# Patient Record
Sex: FEMALE | Race: ASIAN | NOT HISPANIC OR LATINO | Employment: UNEMPLOYED | URBAN - METROPOLITAN AREA
[De-identification: names, ages, dates, MRNs, and addresses within clinical notes are randomized per-mention and may not be internally consistent; named-entity substitution may affect disease eponyms.]

---

## 2017-03-23 ENCOUNTER — ANESTHESIA (OUTPATIENT)
Dept: OBGYN | Age: 22
DRG: 540 | End: 2017-03-23

## 2017-03-23 ENCOUNTER — HOSPITAL ENCOUNTER (INPATIENT)
Age: 22
LOS: 3 days | Discharge: HOME | DRG: 540 | End: 2017-03-26
Attending: OBSTETRICS & GYNECOLOGY | Admitting: OBSTETRICS & GYNECOLOGY

## 2017-03-23 ENCOUNTER — ANESTHESIA EVENT (OUTPATIENT)
Dept: OBGYN | Age: 22
DRG: 540 | End: 2017-03-23

## 2017-03-23 LAB
ABO + RH BLD: NORMAL
ANNOTATION COMMENT IMP: ABNORMAL
ANNOTATION COMMENT IMP: ABNORMAL
BASOPHILS # BLD: 0 K/MCL (ref 0–0.3)
BASOPHILS NFR BLD: 0 %
BLD GP AB SCN SERPL QL: NEGATIVE
BLOOD BANK CMNT PATIENT-IMP: NORMAL
CROSSMATCH EXPIRE: NORMAL
DIFFERENTIAL METHOD BLD: ABNORMAL
EOSINOPHIL # BLD: 0.1 K/MCL (ref 0.1–0.5)
EOSINOPHIL NFR BLD: 1 %
ERYTHROCYTE [DISTWIDTH] IN BLOOD: 13.6 % (ref 11–15)
HAV IGM SER QL: NEGATIVE
HBV CORE IGG+IGM SER QL: POSITIVE
HBV CORE IGM SER QL: NEGATIVE
HBV SURFACE AG SER QL: POSITIVE
HCT VFR BLD CALC: 37.3 % (ref 36–46.5)
HCV AB SER QL: NEGATIVE
HGB BLD-MCNC: 13.1 G/DL (ref 12–15.5)
LYMPHOCYTES # BLD: 1.3 K/MCL (ref 1–4.8)
LYMPHOCYTES NFR BLD: 13 %
MCH RBC QN AUTO: 30.9 PG (ref 26–34)
MCHC RBC AUTO-ENTMCNC: 35.1 G/DL (ref 32–36.5)
MCV RBC AUTO: 88 FL (ref 78–100)
MONOCYTES # BLD: 0.6 K/MCL (ref 0.3–0.9)
MONOCYTES NFR BLD: 6 %
NEUTROPHILS # BLD: 8.2 K/MCL (ref 1.8–7.7)
NEUTS SEG NFR BLD: 80 %
PLAT MORPH BLD: NORMAL
PLATELET # BLD: 159 K/MCL (ref 140–450)
RBC # BLD: 4.24 MIL/MCL (ref 4–5.2)
RBC MORPH BLD: NORMAL
WBC # BLD: 10.3 K/MCL (ref 4.2–11)
WBC MORPH BLD: NORMAL

## 2017-03-23 PROCEDURE — 10002800 HB RX 250 W HCPCS: Performed by: NURSE ANESTHETIST, CERTIFIED REGISTERED

## 2017-03-23 PROCEDURE — 10002803 HB RX 637: Performed by: OBSTETRICS & GYNECOLOGY

## 2017-03-23 PROCEDURE — 10001569 HB ANESTH REGIONAL 1ST 1/2 HR: Performed by: OBSTETRICS & GYNECOLOGY

## 2017-03-23 PROCEDURE — 10000016 HB NURSING L&D PER HOUR

## 2017-03-23 PROCEDURE — 10002803 HB RX 637: Performed by: FAMILY MEDICINE

## 2017-03-23 PROCEDURE — 59514 CESAREAN DELIVERY ONLY: CPT | Performed by: OBSTETRICS & GYNECOLOGY

## 2017-03-23 PROCEDURE — 10002801 HB RX 250 W/O HCPCS: Performed by: NURSE ANESTHETIST, CERTIFIED REGISTERED

## 2017-03-23 PROCEDURE — 80074 ACUTE HEPATITIS PANEL: CPT

## 2017-03-23 PROCEDURE — 10001785 HB DELIVERY C SECTION

## 2017-03-23 PROCEDURE — 86704 HEP B CORE ANTIBODY TOTAL: CPT

## 2017-03-23 PROCEDURE — 10002801 HB RX 250 W/O HCPCS

## 2017-03-23 PROCEDURE — 36415 COLL VENOUS BLD VENIPUNCTURE: CPT

## 2017-03-23 PROCEDURE — 10002807 HB RX 258: Performed by: NURSE ANESTHETIST, CERTIFIED REGISTERED

## 2017-03-23 PROCEDURE — 86900 BLOOD TYPING SEROLOGIC ABO: CPT

## 2017-03-23 PROCEDURE — 85025 COMPLETE CBC W/AUTO DIFF WBC: CPT

## 2017-03-23 PROCEDURE — 10002800 HB RX 250 W HCPCS

## 2017-03-23 PROCEDURE — 10001570 HB ANESTH REGIONAL ADD'L 1/2 HR: Performed by: OBSTETRICS & GYNECOLOGY

## 2017-03-23 PROCEDURE — 87517 HEPATITIS B DNA QUANT: CPT

## 2017-03-23 PROCEDURE — 10002800 HB RX 250 W HCPCS: Performed by: OBSTETRICS & GYNECOLOGY

## 2017-03-23 PROCEDURE — 10002807 HB RX 258

## 2017-03-23 PROCEDURE — 10002016 HB COUNTER INCENTIVE SPIROMETRY

## 2017-03-23 PROCEDURE — 10000003 HB ROOM CHARGE WOMEN'S HEALTH

## 2017-03-23 RX ORDER — AMOXICILLIN 250 MG
2 CAPSULE ORAL DAILY
Status: DISCONTINUED | OUTPATIENT
Start: 2017-03-23 | End: 2017-03-26 | Stop reason: HOSPADM

## 2017-03-23 RX ORDER — 0.9 % SODIUM CHLORIDE 0.9 %
2 VIAL (ML) INJECTION PRN
Status: DISCONTINUED | OUTPATIENT
Start: 2017-03-23 | End: 2017-03-26 | Stop reason: HOSPADM

## 2017-03-23 RX ORDER — METOCLOPRAMIDE HYDROCHLORIDE 5 MG/ML
INJECTION INTRAMUSCULAR; INTRAVENOUS
Status: COMPLETED
Start: 2017-03-23 | End: 2017-03-23

## 2017-03-23 RX ORDER — ACETAMINOPHEN 325 MG/1
650 TABLET ORAL EVERY 6 HOURS PRN
Status: DISCONTINUED | OUTPATIENT
Start: 2017-03-23 | End: 2017-03-26 | Stop reason: HOSPADM

## 2017-03-23 RX ORDER — NALBUPHINE HYDROCHLORIDE 10 MG/ML
5 INJECTION, SOLUTION INTRAMUSCULAR; INTRAVENOUS; SUBCUTANEOUS EVERY 5 MIN PRN
Status: DISCONTINUED | OUTPATIENT
Start: 2017-03-23 | End: 2017-03-26 | Stop reason: HOSPADM

## 2017-03-23 RX ORDER — SODIUM CHLORIDE, SODIUM LACTATE, POTASSIUM CHLORIDE, CALCIUM CHLORIDE 600; 310; 30; 20 MG/100ML; MG/100ML; MG/100ML; MG/100ML
INJECTION, SOLUTION INTRAVENOUS
Status: COMPLETED
Start: 2017-03-23 | End: 2017-03-23

## 2017-03-23 RX ORDER — SIMETHICONE 80 MG
80 TABLET,CHEWABLE ORAL 4 TIMES DAILY PRN
Status: DISCONTINUED | OUTPATIENT
Start: 2017-03-23 | End: 2017-03-26 | Stop reason: HOSPADM

## 2017-03-23 RX ORDER — PRENATAL VIT/IRON FUM/FOLIC AC 27MG-0.8MG
1 TABLET ORAL DAILY
Status: DISCONTINUED | OUTPATIENT
Start: 2017-03-23 | End: 2017-03-26 | Stop reason: HOSPADM

## 2017-03-23 RX ORDER — FAMOTIDINE 10 MG/ML
INJECTION, SOLUTION INTRAVENOUS
Status: COMPLETED
Start: 2017-03-23 | End: 2017-03-23

## 2017-03-23 RX ORDER — HYDROCODONE BITARTRATE AND ACETAMINOPHEN 5; 325 MG/1; MG/1
1-2 TABLET ORAL EVERY 4 HOURS PRN
Status: DISCONTINUED | OUTPATIENT
Start: 2017-03-23 | End: 2017-03-26 | Stop reason: HOSPADM

## 2017-03-23 RX ORDER — OXYTOCIN 10 [USP'U]/ML
10 INJECTION, SOLUTION INTRAMUSCULAR; INTRAVENOUS ONCE
Status: DISCONTINUED | OUTPATIENT
Start: 2017-03-23 | End: 2017-03-23

## 2017-03-23 RX ORDER — SODIUM CHLORIDE, SODIUM LACTATE, POTASSIUM CHLORIDE, CALCIUM CHLORIDE 600; 310; 30; 20 MG/100ML; MG/100ML; MG/100ML; MG/100ML
INJECTION, SOLUTION INTRAVENOUS
Status: DISPENSED
Start: 2017-03-23 | End: 2017-03-24

## 2017-03-23 RX ORDER — KETOROLAC TROMETHAMINE 30 MG/ML
INJECTION, SOLUTION INTRAMUSCULAR; INTRAVENOUS
Status: DISPENSED
Start: 2017-03-23 | End: 2017-03-24

## 2017-03-23 RX ORDER — IBUPROFEN 600 MG/1
600 TABLET ORAL EVERY 6 HOURS
Status: DISCONTINUED | OUTPATIENT
Start: 2017-03-24 | End: 2017-03-26 | Stop reason: HOSPADM

## 2017-03-23 RX ORDER — LIDOCAINE HYDROCHLORIDE 20 MG/ML
INJECTION, SOLUTION EPIDURAL; INFILTRATION; INTRACAUDAL; PERINEURAL PRN
Status: DISCONTINUED | OUTPATIENT
Start: 2017-03-23 | End: 2017-03-23

## 2017-03-23 RX ORDER — LIDOCAINE HYDROCHLORIDE 20 MG/ML
INJECTION, SOLUTION EPIDURAL; INFILTRATION; INTRACAUDAL; PERINEURAL
Status: DISPENSED
Start: 2017-03-23 | End: 2017-03-24

## 2017-03-23 RX ORDER — 0.9 % SODIUM CHLORIDE 0.9 %
2 VIAL (ML) INJECTION EVERY 12 HOURS SCHEDULED
Status: DISCONTINUED | OUTPATIENT
Start: 2017-03-23 | End: 2017-03-26 | Stop reason: HOSPADM

## 2017-03-23 RX ORDER — ONDANSETRON 2 MG/ML
4 INJECTION INTRAMUSCULAR; INTRAVENOUS 2 TIMES DAILY PRN
Status: DISCONTINUED | OUTPATIENT
Start: 2017-03-23 | End: 2017-03-23 | Stop reason: HOSPADM

## 2017-03-23 RX ORDER — BUPIVACAINE HYDROCHLORIDE 5 MG/ML
INJECTION, SOLUTION EPIDURAL; INTRACAUDAL PRN
Status: DISCONTINUED | OUTPATIENT
Start: 2017-03-23 | End: 2017-03-23

## 2017-03-23 RX ORDER — HYDROMORPHONE HYDROCHLORIDE 1 MG/ML
0.2 INJECTION, SOLUTION INTRAMUSCULAR; INTRAVENOUS; SUBCUTANEOUS EVERY 5 MIN PRN
Status: DISCONTINUED | OUTPATIENT
Start: 2017-03-23 | End: 2017-03-23 | Stop reason: HOSPADM

## 2017-03-23 RX ORDER — METOCLOPRAMIDE HYDROCHLORIDE 5 MG/ML
10 INJECTION INTRAMUSCULAR; INTRAVENOUS
Status: COMPLETED | OUTPATIENT
Start: 2017-03-23 | End: 2017-03-23

## 2017-03-23 RX ORDER — KETAMINE HYDROCHLORIDE 50 MG/ML
INJECTION, SOLUTION, CONCENTRATE INTRAMUSCULAR; INTRAVENOUS
Status: DISCONTINUED
Start: 2017-03-23 | End: 2017-03-23 | Stop reason: WASHOUT

## 2017-03-23 RX ORDER — SODIUM CHLORIDE, SODIUM LACTATE, POTASSIUM CHLORIDE, CALCIUM CHLORIDE 600; 310; 30; 20 MG/100ML; MG/100ML; MG/100ML; MG/100ML
INJECTION, SOLUTION INTRAVENOUS CONTINUOUS PRN
Status: DISCONTINUED | OUTPATIENT
Start: 2017-03-23 | End: 2017-03-23

## 2017-03-23 RX ORDER — OXYTOCIN 10 [USP'U]/ML
INJECTION, SOLUTION INTRAMUSCULAR; INTRAVENOUS
Status: DISCONTINUED
Start: 2017-03-23 | End: 2017-03-23 | Stop reason: WASHOUT

## 2017-03-23 RX ORDER — ONDANSETRON 2 MG/ML
INJECTION INTRAMUSCULAR; INTRAVENOUS
Status: DISPENSED
Start: 2017-03-23 | End: 2017-03-24

## 2017-03-23 RX ORDER — HYDROCORTISONE ACETATE 25 MG/1
25 SUPPOSITORY RECTAL EVERY 8 HOURS PRN
Status: DISCONTINUED | OUTPATIENT
Start: 2017-03-23 | End: 2017-03-26 | Stop reason: HOSPADM

## 2017-03-23 RX ORDER — PROCHLORPERAZINE MALEATE 5 MG/1
5 TABLET ORAL EVERY 4 HOURS PRN
Status: DISCONTINUED | OUTPATIENT
Start: 2017-03-23 | End: 2017-03-26 | Stop reason: HOSPADM

## 2017-03-23 RX ORDER — MIDAZOLAM HYDROCHLORIDE 1 MG/ML
INJECTION, SOLUTION INTRAMUSCULAR; INTRAVENOUS
Status: DISCONTINUED
Start: 2017-03-23 | End: 2017-03-23 | Stop reason: WASHOUT

## 2017-03-23 RX ORDER — BUPIVACAINE HYDROCHLORIDE 5 MG/ML
INJECTION, SOLUTION EPIDURAL; INTRACAUDAL
Status: DISPENSED
Start: 2017-03-23 | End: 2017-03-24

## 2017-03-23 RX ORDER — DIPHENHYDRAMINE HYDROCHLORIDE 50 MG/ML
25 INJECTION INTRAMUSCULAR; INTRAVENOUS
Status: DISCONTINUED | OUTPATIENT
Start: 2017-03-23 | End: 2017-03-23 | Stop reason: HOSPADM

## 2017-03-23 RX ORDER — LIDOCAINE HYDROCHLORIDE AND EPINEPHRINE 15; 5 MG/ML; UG/ML
INJECTION, SOLUTION EPIDURAL PRN
Status: DISCONTINUED | OUTPATIENT
Start: 2017-03-23 | End: 2017-03-23

## 2017-03-23 RX ORDER — FERROUS SULFATE 325(65) MG
325 TABLET ORAL 2 TIMES DAILY WITH MEALS
Status: DISCONTINUED | OUTPATIENT
Start: 2017-03-23 | End: 2017-03-26 | Stop reason: HOSPADM

## 2017-03-23 RX ORDER — FAMOTIDINE 10 MG/ML
20 INJECTION, SOLUTION INTRAVENOUS
Status: COMPLETED | OUTPATIENT
Start: 2017-03-23 | End: 2017-03-23

## 2017-03-23 RX ORDER — SODIUM CHLORIDE, SODIUM LACTATE, POTASSIUM CHLORIDE, CALCIUM CHLORIDE 600; 310; 30; 20 MG/100ML; MG/100ML; MG/100ML; MG/100ML
INJECTION, SOLUTION INTRAVENOUS CONTINUOUS
Status: ACTIVE | OUTPATIENT
Start: 2017-03-23 | End: 2017-03-24

## 2017-03-23 RX ORDER — MIDAZOLAM HYDROCHLORIDE 1 MG/ML
INJECTION INTRAMUSCULAR; INTRAVENOUS
Status: DISCONTINUED
Start: 2017-03-23 | End: 2017-03-23 | Stop reason: WASHOUT

## 2017-03-23 RX ORDER — ONDANSETRON 2 MG/ML
4 INJECTION INTRAMUSCULAR; INTRAVENOUS 2 TIMES DAILY PRN
Status: DISCONTINUED | OUTPATIENT
Start: 2017-03-23 | End: 2017-03-26 | Stop reason: HOSPADM

## 2017-03-23 RX ORDER — LIDOCAINE HYDROCHLORIDE 10 MG/ML
30 INJECTION, SOLUTION EPIDURAL; INFILTRATION; INTRACAUDAL; PERINEURAL
Status: DISCONTINUED | OUTPATIENT
Start: 2017-03-23 | End: 2017-03-23

## 2017-03-23 RX ORDER — CALCIUM CARBONATE 500 MG/1
500 TABLET, CHEWABLE ORAL EVERY 4 HOURS PRN
Status: DISCONTINUED | OUTPATIENT
Start: 2017-03-23 | End: 2017-03-23

## 2017-03-23 RX ORDER — DEXAMETHASONE SODIUM PHOSPHATE 4 MG/ML
4 INJECTION, SOLUTION INTRA-ARTICULAR; INTRALESIONAL; INTRAMUSCULAR; INTRAVENOUS; SOFT TISSUE
Status: DISCONTINUED | OUTPATIENT
Start: 2017-03-23 | End: 2017-03-23 | Stop reason: HOSPADM

## 2017-03-23 RX ADMIN — Medication 700 ML/HR: at 13:51

## 2017-03-23 RX ADMIN — SODIUM CHLORIDE, POTASSIUM CHLORIDE, SODIUM LACTATE AND CALCIUM CHLORIDE 1000 ML: 600; 310; 30; 20 INJECTION, SOLUTION INTRAVENOUS at 16:45

## 2017-03-23 RX ADMIN — LIDOCAINE HYDROCHLORIDE 3 ML: 20 INJECTION, SOLUTION EPIDURAL; INFILTRATION; INTRACAUDAL; PERINEURAL at 13:40

## 2017-03-23 RX ADMIN — KETOROLAC TROMETHAMINE 30 MG: 30 INJECTION, SOLUTION INTRAMUSCULAR at 20:34

## 2017-03-23 RX ADMIN — WATER 2000 MG: 1 INJECTION INTRAMUSCULAR; INTRAVENOUS; SUBCUTANEOUS at 13:02

## 2017-03-23 RX ADMIN — METOCLOPRAMIDE HYDROCHLORIDE 10 MG: 5 INJECTION INTRAMUSCULAR; INTRAVENOUS at 12:06

## 2017-03-23 RX ADMIN — LIDOCAINE HYDROCHLORIDE,EPINEPHRINE BITARTRATE 3 ML: 15; .005 INJECTION, SOLUTION EPIDURAL; INFILTRATION; INTRACAUDAL; PERINEURAL at 12:28

## 2017-03-23 RX ADMIN — FAMOTIDINE 20 MG: 10 INJECTION, SOLUTION INTRAVENOUS at 12:06

## 2017-03-23 RX ADMIN — SODIUM CHLORIDE, POTASSIUM CHLORIDE, SODIUM LACTATE AND CALCIUM CHLORIDE: 600; 310; 30; 20 INJECTION, SOLUTION INTRAVENOUS at 12:18

## 2017-03-23 RX ADMIN — LIDOCAINE HYDROCHLORIDE 3 ML: 20 INJECTION, SOLUTION EPIDURAL; INFILTRATION; INTRACAUDAL; PERINEURAL at 12:37

## 2017-03-23 RX ADMIN — FENTANYL CITRATE 100 MCG: 50 INJECTION INTRAMUSCULAR; INTRAVENOUS at 13:20

## 2017-03-23 RX ADMIN — KETOROLAC TROMETHAMINE 30 MG: 15 INJECTION, SOLUTION INTRAMUSCULAR; INTRAVENOUS at 14:30

## 2017-03-23 RX ADMIN — PRENATAL VIT W/ FE FUMARATE-FA TAB 27-0.8 MG 1 TABLET: 27-0.8 TAB at 19:44

## 2017-03-23 RX ADMIN — SODIUM CHLORIDE, POTASSIUM CHLORIDE, SODIUM LACTATE AND CALCIUM CHLORIDE 500 ML: 600; 310; 30; 20 INJECTION, SOLUTION INTRAVENOUS at 10:48

## 2017-03-23 RX ADMIN — METOCLOPRAMIDE 10 MG: 5 INJECTION, SOLUTION INTRAMUSCULAR; INTRAVENOUS at 12:06

## 2017-03-23 RX ADMIN — BUPIVACAINE HYDROCHLORIDE 3 ML: 5 INJECTION, SOLUTION EPIDURAL; INTRACAUDAL at 14:23

## 2017-03-23 RX ADMIN — LIDOCAINE HYDROCHLORIDE 3 ML: 20 INJECTION, SOLUTION EPIDURAL; INFILTRATION; INTRACAUDAL; PERINEURAL at 12:34

## 2017-03-23 RX ADMIN — BUPIVACAINE HYDROCHLORIDE 3 ML: 5 INJECTION, SOLUTION EPIDURAL; INTRACAUDAL at 14:02

## 2017-03-23 RX ADMIN — FENTANYL CITRATE 150 MCG: 50 INJECTION INTRAMUSCULAR; INTRAVENOUS at 14:02

## 2017-03-23 RX ADMIN — BUPIVACAINE HYDROCHLORIDE 3 ML: 5 INJECTION, SOLUTION EPIDURAL; INTRACAUDAL at 13:55

## 2017-03-23 RX ADMIN — Medication 325 MG: at 19:44

## 2017-03-23 RX ADMIN — DOCUSATE SODIUM,SENNOSIDES 2 TABLET: 50; 8.6 TABLET, FILM COATED ORAL at 19:44

## 2017-03-23 RX ADMIN — LIDOCAINE HYDROCHLORIDE 3 ML: 20 INJECTION, SOLUTION EPIDURAL; INFILTRATION; INTRACAUDAL; PERINEURAL at 13:36

## 2017-03-23 RX ADMIN — LIDOCAINE HYDROCHLORIDE 1 ML: 20 INJECTION, SOLUTION EPIDURAL; INFILTRATION; INTRACAUDAL; PERINEURAL at 13:20

## 2017-03-23 RX ADMIN — LIDOCAINE HYDROCHLORIDE 3 ML: 20 INJECTION, SOLUTION EPIDURAL; INFILTRATION; INTRACAUDAL; PERINEURAL at 13:17

## 2017-03-23 RX ADMIN — CEFAZOLIN 2 G: 10 INJECTION, POWDER, FOR SOLUTION INTRAVENOUS at 13:18

## 2017-03-23 RX ADMIN — BUPIVACAINE HYDROCHLORIDE 3 ML: 5 INJECTION, SOLUTION EPIDURAL; INTRACAUDAL at 14:15

## 2017-03-23 RX ADMIN — LIDOCAINE HYDROCHLORIDE 3 ML: 20 INJECTION, SOLUTION EPIDURAL; INFILTRATION; INTRACAUDAL; PERINEURAL at 12:31

## 2017-03-23 RX ADMIN — HYDROCODONE BITARTRATE AND ACETAMINOPHEN 2 TABLET: 5; 325 TABLET ORAL at 19:44

## 2017-03-23 RX ADMIN — BUPIVACAINE HYDROCHLORIDE 3 ML: 5 INJECTION, SOLUTION EPIDURAL; INTRACAUDAL at 13:47

## 2017-03-23 ASSESSMENT — LIFESTYLE VARIABLES
HOW MANY STANDARD DRINKS CONTAINING ALCOHOL DO YOU HAVE ON A TYPICAL DAY: 0,1 OR 2
TOBACCO_USE_STATUS_IN_THE_LAST_30_DAYS: NO TOBACCO USED IN THE LAST 30 DAYS
HOW OFTEN DO YOU HAVE 6 OR MORE DRINKS ON ONE OCCASION: NEVER
AUDIT-C TOTAL SCORE: 0
AUDIT-C TOTAL SCORE: 0
HOW OFTEN DO YOU HAVE A DRINK CONTAINING ALCOHOL: NEVER
TOBACCO_USE_STATUS_IN_THE_LAST_30_DAYS: NO TOBACCO USED IN THE LAST 30 DAYS
HOW OFTEN DO YOU HAVE A DRINK CONTAINING ALCOHOL: NEVER
ALCOHOL_USE_STATUS: NO OR LOW RISK WITH VALIDATED TOOL
HOW OFTEN DO YOU HAVE 6 OR MORE DRINKS ON ONE OCCASION: NEVER
E-CIGARETTE_USE: NO E-CIGARETTES USE IN THE LAST 30 DAYS
ALCOHOL_USE_STATUS: NO OR LOW RISK WITH VALIDATED TOOL
HOW MANY STANDARD DRINKS CONTAINING ALCOHOL DO YOU HAVE ON A TYPICAL DAY: 0,1 OR 2
E-CIGARETTE_USE: NO E-CIGARETTES USE IN THE LAST 30 DAYS

## 2017-03-23 ASSESSMENT — ACTIVITIES OF DAILY LIVING (ADL)
ADL_BEFORE_ADMISSION: INDEPENDENT
RECENT_DECLINE_ADL: NO
NEEDS_ASSIST: NO
ADL_SCORE: 12
CHRONIC_PAIN_PRESENT: NO
ADL_SHORT_OF_BREATH: NO

## 2017-03-23 ASSESSMENT — COGNITIVE AND FUNCTIONAL STATUS - GENERAL
ARE YOU DEAF OR DO YOU HAVE SERIOUS DIFFICULTY  HEARING: NO
ARE YOU BLIND OR DO YOU HAVE SERIOUS DIFFICULTY SEEING, EVEN WHEN WEARING GLASSES: NO

## 2017-03-24 PROBLEM — Z98.891 S/P CESAREAN SECTION: Status: ACTIVE | Noted: 2017-03-24

## 2017-03-24 LAB
ERYTHROCYTE [DISTWIDTH] IN BLOOD: 13.8 % (ref 11–15)
HCT VFR BLD CALC: 23.7 % (ref 36–46.5)
HGB BLD-MCNC: 8.3 G/DL (ref 12–15.5)
MCH RBC QN AUTO: 31 PG (ref 26–34)
MCHC RBC AUTO-ENTMCNC: 35 G/DL (ref 32–36.5)
MCV RBC AUTO: 88.4 FL (ref 78–100)
PLATELET # BLD: 126 K/MCL (ref 140–450)
RBC # BLD: 2.68 MIL/MCL (ref 4–5.2)
WBC # BLD: 10.3 K/MCL (ref 4.2–11)

## 2017-03-24 PROCEDURE — 10002803 HB RX 637: Performed by: FAMILY MEDICINE

## 2017-03-24 PROCEDURE — 10002803 HB RX 637: Performed by: OBSTETRICS & GYNECOLOGY

## 2017-03-24 PROCEDURE — 10004657 HB COUNTER-LACTATION CONSULT COURTESY

## 2017-03-24 PROCEDURE — 85027 COMPLETE CBC AUTOMATED: CPT

## 2017-03-24 PROCEDURE — 10000003 HB ROOM CHARGE WOMEN'S HEALTH

## 2017-03-24 PROCEDURE — 10002800 HB RX 250 W HCPCS: Performed by: OBSTETRICS & GYNECOLOGY

## 2017-03-24 PROCEDURE — 36415 COLL VENOUS BLD VENIPUNCTURE: CPT

## 2017-03-24 RX ADMIN — HYDROCODONE BITARTRATE AND ACETAMINOPHEN 2 TABLET: 5; 325 TABLET ORAL at 17:51

## 2017-03-24 RX ADMIN — PRENATAL VIT W/ FE FUMARATE-FA TAB 27-0.8 MG 1 TABLET: 27-0.8 TAB at 11:16

## 2017-03-24 RX ADMIN — DOCUSATE SODIUM,SENNOSIDES 2 TABLET: 50; 8.6 TABLET, FILM COATED ORAL at 11:16

## 2017-03-24 RX ADMIN — Medication 325 MG: at 11:16

## 2017-03-24 RX ADMIN — HYDROCODONE BITARTRATE AND ACETAMINOPHEN 2 TABLET: 5; 325 TABLET ORAL at 06:27

## 2017-03-24 RX ADMIN — KETOROLAC TROMETHAMINE 30 MG: 30 INJECTION, SOLUTION INTRAMUSCULAR at 02:54

## 2017-03-24 RX ADMIN — IBUPROFEN 600 MG: 600 TABLET, FILM COATED ORAL at 21:32

## 2017-03-24 RX ADMIN — HYDROCODONE BITARTRATE AND ACETAMINOPHEN 2 TABLET: 5; 325 TABLET ORAL at 00:55

## 2017-03-24 RX ADMIN — HYDROCODONE BITARTRATE AND ACETAMINOPHEN 2 TABLET: 5; 325 TABLET ORAL at 11:16

## 2017-03-24 RX ADMIN — KETOROLAC TROMETHAMINE 30 MG: 30 INJECTION, SOLUTION INTRAMUSCULAR at 09:22

## 2017-03-24 ASSESSMENT — PAIN SCALES - GENERAL
PAIN_LEVEL_AT_REST: 4
PAIN_LEVEL_AT_REST: 0
PAIN_LEVEL_AT_REST: 6
PAIN_LEVEL_AT_REST: 2
PAIN_LEVEL_AT_REST: 4
PAIN_LEVEL_WITH_ACTIVITY: 3
PAIN_LEVEL_AT_REST: 3
PAIN_LEVEL_AT_REST: 3
PAIN_LEVEL_AT_REST: 5

## 2017-03-25 LAB
ERYTHROCYTE [DISTWIDTH] IN BLOOD: 13.9 % (ref 11–15)
HCT VFR BLD CALC: 24.1 % (ref 36–46.5)
HGB BLD-MCNC: 8 G/DL (ref 12–15.5)
MCH RBC QN AUTO: 30.7 PG (ref 26–34)
MCHC RBC AUTO-ENTMCNC: 33.2 G/DL (ref 32–36.5)
MCV RBC AUTO: 92.3 FL (ref 78–100)
PLATELET # BLD: 146 K/MCL (ref 140–450)
RBC # BLD: 2.61 MIL/MCL (ref 4–5.2)
WBC # BLD: 10.3 K/MCL (ref 4.2–11)

## 2017-03-25 PROCEDURE — 10002803 HB RX 637: Performed by: OBSTETRICS & GYNECOLOGY

## 2017-03-25 PROCEDURE — 10000003 HB ROOM CHARGE WOMEN'S HEALTH

## 2017-03-25 PROCEDURE — 36415 COLL VENOUS BLD VENIPUNCTURE: CPT

## 2017-03-25 PROCEDURE — 10002803 HB RX 637: Performed by: FAMILY MEDICINE

## 2017-03-25 PROCEDURE — 85027 COMPLETE CBC AUTOMATED: CPT

## 2017-03-25 RX ADMIN — HYDROCODONE BITARTRATE AND ACETAMINOPHEN 2 TABLET: 5; 325 TABLET ORAL at 09:01

## 2017-03-25 RX ADMIN — DOCUSATE SODIUM,SENNOSIDES 2 TABLET: 50; 8.6 TABLET, FILM COATED ORAL at 09:05

## 2017-03-25 RX ADMIN — HYDROCODONE BITARTRATE AND ACETAMINOPHEN 1 TABLET: 5; 325 TABLET ORAL at 00:35

## 2017-03-25 RX ADMIN — PRENATAL VIT W/ FE FUMARATE-FA TAB 27-0.8 MG 1 TABLET: 27-0.8 TAB at 09:04

## 2017-03-25 RX ADMIN — HYDROCODONE BITARTRATE AND ACETAMINOPHEN 2 TABLET: 5; 325 TABLET ORAL at 20:27

## 2017-03-25 RX ADMIN — Medication 325 MG: at 09:04

## 2017-03-25 RX ADMIN — IBUPROFEN 600 MG: 600 TABLET, FILM COATED ORAL at 05:27

## 2017-03-25 RX ADMIN — IBUPROFEN 600 MG: 600 TABLET, FILM COATED ORAL at 11:35

## 2017-03-25 ASSESSMENT — PAIN SCALES - GENERAL
PAIN_LEVEL_AT_REST: 3
PAIN_LEVEL_AT_REST: 2
PAIN_LEVEL_AT_REST: 3
PAIN_LEVEL_AT_REST: 4
PAIN_LEVEL_AT_REST: 2
PAIN_LEVEL_AT_REST: 0
PAIN_LEVEL_AT_REST: 0
PAIN_LEVEL_AT_REST: 1

## 2017-03-26 VITALS
OXYGEN SATURATION: 98 % | HEART RATE: 98 BPM | HEIGHT: 56 IN | RESPIRATION RATE: 16 BRPM | TEMPERATURE: 97.8 F | WEIGHT: 128.46 LBS | DIASTOLIC BLOOD PRESSURE: 73 MMHG | SYSTOLIC BLOOD PRESSURE: 112 MMHG | BODY MASS INDEX: 28.9 KG/M2

## 2017-03-26 PROCEDURE — 10002800 HB RX 250 W HCPCS: Performed by: OBSTETRICS & GYNECOLOGY

## 2017-03-26 PROCEDURE — 10002803 HB RX 637: Performed by: OBSTETRICS & GYNECOLOGY

## 2017-03-26 RX ORDER — AMOXICILLIN 250 MG
2 CAPSULE ORAL DAILY
Qty: 30 TABLET | Refills: 3 | Status: SHIPPED | OUTPATIENT
Start: 2017-03-26

## 2017-03-26 RX ORDER — IBUPROFEN 600 MG/1
600 TABLET ORAL EVERY 6 HOURS
Qty: 90 TABLET | Refills: 2 | Status: SHIPPED | OUTPATIENT
Start: 2017-03-26

## 2017-03-26 RX ORDER — MEDROXYPROGESTERONE ACETATE 150 MG/ML
150 INJECTION, SUSPENSION INTRAMUSCULAR ONCE
Status: COMPLETED | OUTPATIENT
Start: 2017-03-26 | End: 2017-03-26

## 2017-03-26 RX ORDER — HYDROCODONE BITARTRATE AND ACETAMINOPHEN 5; 325 MG/1; MG/1
1-2 TABLET ORAL EVERY 4 HOURS PRN
Qty: 30 TABLET | Refills: 0 | Status: SHIPPED | OUTPATIENT
Start: 2017-03-26

## 2017-03-26 RX ORDER — FERROUS SULFATE 325(65) MG
325 TABLET ORAL 2 TIMES DAILY WITH MEALS
Qty: 60 TABLET | Refills: 2 | Status: SHIPPED | OUTPATIENT
Start: 2017-03-26

## 2017-03-26 RX ADMIN — IBUPROFEN 600 MG: 600 TABLET, FILM COATED ORAL at 12:21

## 2017-03-26 RX ADMIN — DOCUSATE SODIUM,SENNOSIDES 2 TABLET: 50; 8.6 TABLET, FILM COATED ORAL at 09:47

## 2017-03-26 RX ADMIN — Medication 325 MG: at 09:47

## 2017-03-26 RX ADMIN — IBUPROFEN 600 MG: 600 TABLET, FILM COATED ORAL at 05:40

## 2017-03-26 RX ADMIN — MEDROXYPROGESTERONE ACETATE 150 MG: 150 INJECTION, SUSPENSION INTRAMUSCULAR at 09:49

## 2017-03-26 RX ADMIN — PRENATAL VIT W/ FE FUMARATE-FA TAB 27-0.8 MG 1 TABLET: 27-0.8 TAB at 09:47

## 2017-03-26 ASSESSMENT — PAIN SCALES - GENERAL
PAIN_LEVEL_AT_REST: 1
PAIN_LEVEL_AT_REST: 1
PAIN_LEVEL_AT_REST: 2
PAIN_LEVEL_AT_REST: 1

## 2017-03-27 LAB
HBV DNA SERPL NAA+PROBE-ACNC: 136 IUNITS/ML
HBV DNA SERPL NAA+PROBE-LOG IU: 2.13 IUNITS/ML

## 2018-02-14 ENCOUNTER — APPOINTMENT (RX ONLY)
Dept: URBAN - METROPOLITAN AREA CLINIC 69 | Facility: CLINIC | Age: 23
Setting detail: DERMATOLOGY
End: 2018-02-14

## 2018-02-14 DIAGNOSIS — L21.8 OTHER SEBORRHEIC DERMATITIS: ICD-10-CM

## 2018-02-14 PROCEDURE — ? COUNSELING

## 2018-02-14 PROCEDURE — ? TREATMENT REGIMEN

## 2018-02-14 PROCEDURE — 99202 OFFICE O/P NEW SF 15 MIN: CPT

## 2018-02-14 PROCEDURE — ? PRESCRIPTION

## 2018-02-14 RX ORDER — KETOCONAZOLE 20.5 MG/ML
SHAMPOO, SUSPENSION TOPICAL
Qty: 1 | Refills: 6 | Status: ERX | COMMUNITY
Start: 2018-02-14

## 2018-02-14 RX ORDER — FLUOCINOLONE ACETONIDE 0.11 MG/ML
OIL TOPICAL
Qty: 1 | Refills: 3 | Status: ERX | COMMUNITY
Start: 2018-02-14

## 2018-02-14 RX ADMIN — FLUOCINOLONE ACETONIDE: 0.11 OIL TOPICAL at 14:43

## 2018-02-14 RX ADMIN — KETOCONAZOLE: 20.5 SHAMPOO, SUSPENSION TOPICAL at 14:43

## 2018-02-14 ASSESSMENT — LOCATION ZONE DERM: LOCATION ZONE: SCALP

## 2018-02-14 ASSESSMENT — LOCATION DETAILED DESCRIPTION DERM: LOCATION DETAILED: MID-FRONTAL SCALP

## 2018-02-14 ASSESSMENT — SEVERITY ASSESSMENT: HOW SEVERE IS THIS PATIENT'S CONDITION?: MODERATE

## 2018-02-14 ASSESSMENT — LOCATION SIMPLE DESCRIPTION DERM: LOCATION SIMPLE: ANTERIOR SCALP

## 2018-02-14 NOTE — PROCEDURE: TREATMENT REGIMEN
Initiate Treatment: Ketoconazole shampoo \\nDream smooth oil
Detail Level: Zone
Plan: Recommend to alternate between shampoos

## 2018-02-27 ENCOUNTER — COMMUNICATION - HEALTHEAST (OUTPATIENT)
Dept: FAMILY MEDICINE | Facility: CLINIC | Age: 23
End: 2018-02-27

## 2018-03-08 ENCOUNTER — OFFICE VISIT - HEALTHEAST (OUTPATIENT)
Dept: FAMILY MEDICINE | Facility: CLINIC | Age: 23
End: 2018-03-08

## 2018-03-08 DIAGNOSIS — Z12.4 PAP SMEAR FOR CERVICAL CANCER SCREENING: ICD-10-CM

## 2018-03-08 DIAGNOSIS — A74.9 CHLAMYDIA: ICD-10-CM

## 2018-03-08 DIAGNOSIS — Z23 NEED FOR IMMUNIZATION AGAINST INFLUENZA: ICD-10-CM

## 2018-03-08 DIAGNOSIS — Z00.00 ROUTINE GENERAL MEDICAL EXAMINATION AT A HEALTH CARE FACILITY: ICD-10-CM

## 2018-03-08 DIAGNOSIS — Z83.3 FAMILY HISTORY OF DIABETES MELLITUS: ICD-10-CM

## 2018-03-08 DIAGNOSIS — Z11.3 SCREEN FOR STD (SEXUALLY TRANSMITTED DISEASE): ICD-10-CM

## 2018-03-08 DIAGNOSIS — N92.6 IRREGULAR PERIODS: ICD-10-CM

## 2018-03-08 LAB
ERYTHROCYTE [DISTWIDTH] IN BLOOD BY AUTOMATED COUNT: 11.5 % (ref 11–14.5)
HBA1C MFR BLD: 5.1 % (ref 3.5–6)
HCT VFR BLD AUTO: 36.1 % (ref 35–47)
HGB BLD-MCNC: 12.1 G/DL (ref 12–16)
MCH RBC QN AUTO: 30.6 PG (ref 27–34)
MCHC RBC AUTO-ENTMCNC: 33.6 G/DL (ref 32–36)
MCV RBC AUTO: 91 FL (ref 80–100)
PLATELET # BLD AUTO: 186 THOU/UL (ref 140–440)
PMV BLD AUTO: 8.6 FL (ref 7–10)
RBC # BLD AUTO: 3.97 MILL/UL (ref 3.8–5.4)
TSH SERPL DL<=0.005 MIU/L-ACNC: 2.33 UIU/ML (ref 0.3–5)
WBC: 6.2 THOU/UL (ref 4–11)

## 2018-03-08 ASSESSMENT — MIFFLIN-ST. JEOR: SCORE: 1195.82

## 2018-03-12 ENCOUNTER — COMMUNICATION - HEALTHEAST (OUTPATIENT)
Dept: LAB | Facility: CLINIC | Age: 23
End: 2018-03-12

## 2018-03-12 LAB
BKR LAB AP ABNORMAL BLEEDING: NO
BKR LAB AP BIRTH CONTROL/HORMONES: NORMAL
BKR LAB AP CERVICAL APPEARANCE: NORMAL
BKR LAB AP GYN ADEQUACY: NORMAL
BKR LAB AP GYN INTERPRETATION: NORMAL
BKR LAB AP HPV REFLEX: NORMAL
BKR LAB AP LMP: NORMAL
BKR LAB AP PATIENT STATUS: NORMAL
BKR LAB AP PREVIOUS ABNORMAL: NORMAL
BKR LAB AP PREVIOUS NORMAL: NORMAL
C TRACH DNA SPEC QL PROBE+SIG AMP: POSITIVE
HIGH RISK?: NO
N GONORRHOEA DNA SPEC QL NAA+PROBE: NEGATIVE
PATH REPORT.COMMENTS IMP SPEC: NORMAL
RESULT FLAG (HE HISTORICAL CONVERSION): NORMAL

## 2018-03-13 ENCOUNTER — COMMUNICATION - HEALTHEAST (OUTPATIENT)
Dept: FAMILY MEDICINE | Facility: CLINIC | Age: 23
End: 2018-03-13

## 2018-03-18 ENCOUNTER — AMBULATORY - HEALTHEAST (OUTPATIENT)
Dept: FAMILY MEDICINE | Facility: CLINIC | Age: 23
End: 2018-03-18

## 2018-03-19 ENCOUNTER — OFFICE VISIT - HEALTHEAST (OUTPATIENT)
Dept: FAMILY MEDICINE | Facility: CLINIC | Age: 23
End: 2018-03-19

## 2018-03-19 DIAGNOSIS — N89.8 VAGINAL DISCHARGE: ICD-10-CM

## 2018-03-19 DIAGNOSIS — Z86.19 HISTORY OF CHLAMYDIA INFECTION: ICD-10-CM

## 2018-03-19 LAB
CLUE CELLS: NORMAL
TRICHOMONAS, WET PREP: NORMAL
YEAST, WET PREP: NORMAL

## 2018-03-19 ASSESSMENT — MIFFLIN-ST. JEOR: SCORE: 1213.4

## 2018-04-19 ENCOUNTER — OFFICE VISIT - HEALTHEAST (OUTPATIENT)
Dept: FAMILY MEDICINE | Facility: CLINIC | Age: 23
End: 2018-04-19

## 2018-04-19 DIAGNOSIS — K59.00 CONSTIPATION: ICD-10-CM

## 2018-04-19 DIAGNOSIS — R39.15 URINARY URGENCY: ICD-10-CM

## 2018-04-19 DIAGNOSIS — R10.30 LOWER ABDOMINAL PAIN: ICD-10-CM

## 2018-04-19 DIAGNOSIS — R10.11 RUQ PAIN: ICD-10-CM

## 2018-04-19 DIAGNOSIS — N97.9 INFERTILITY, FEMALE: ICD-10-CM

## 2018-04-19 DIAGNOSIS — Z86.19 HISTORY OF CHLAMYDIA: ICD-10-CM

## 2018-04-19 LAB
ALBUMIN SERPL-MCNC: 3.9 G/DL (ref 3.5–5)
ALBUMIN UR-MCNC: NEGATIVE MG/DL
ALP SERPL-CCNC: 66 U/L (ref 45–120)
ALT SERPL W P-5'-P-CCNC: <9 U/L (ref 0–45)
ANION GAP SERPL CALCULATED.3IONS-SCNC: 13 MMOL/L (ref 5–18)
APPEARANCE UR: CLEAR
AST SERPL W P-5'-P-CCNC: 18 U/L (ref 0–40)
BACTERIA #/AREA URNS HPF: ABNORMAL HPF
BILIRUB SERPL-MCNC: 0.3 MG/DL (ref 0–1)
BILIRUB UR QL STRIP: NEGATIVE
BUN SERPL-MCNC: 11 MG/DL (ref 8–22)
CALCIUM SERPL-MCNC: 9.2 MG/DL (ref 8.5–10.5)
CHLORIDE BLD-SCNC: 106 MMOL/L (ref 98–107)
CO2 SERPL-SCNC: 22 MMOL/L (ref 22–31)
COLOR UR AUTO: YELLOW
CREAT SERPL-MCNC: 0.76 MG/DL (ref 0.6–1.1)
ERYTHROCYTE [DISTWIDTH] IN BLOOD BY AUTOMATED COUNT: 11.4 % (ref 11–14.5)
FSH SERPL-ACNC: 7.5 MIU/ML
GFR SERPL CREATININE-BSD FRML MDRD: >60 ML/MIN/1.73M2
GLUCOSE BLD-MCNC: 80 MG/DL (ref 70–125)
GLUCOSE UR STRIP-MCNC: NEGATIVE MG/DL
HCT VFR BLD AUTO: 38.2 % (ref 35–47)
HGB BLD-MCNC: 13 G/DL (ref 12–16)
HGB UR QL STRIP: ABNORMAL
KETONES UR STRIP-MCNC: NEGATIVE MG/DL
LEUKOCYTE ESTERASE UR QL STRIP: NEGATIVE
MCH RBC QN AUTO: 30.8 PG (ref 27–34)
MCHC RBC AUTO-ENTMCNC: 34 G/DL (ref 32–36)
MCV RBC AUTO: 91 FL (ref 80–100)
NITRATE UR QL: NEGATIVE
PH UR STRIP: 6.5 [PH] (ref 5–8)
PLATELET # BLD AUTO: 196 THOU/UL (ref 140–440)
PMV BLD AUTO: 8.6 FL (ref 7–10)
POTASSIUM BLD-SCNC: 3.9 MMOL/L (ref 3.5–5)
PROLACTIN SERPL-MCNC: 10.3 NG/ML (ref 0–20)
PROT SERPL-MCNC: 7.9 G/DL (ref 6–8)
RBC # BLD AUTO: 4.2 MILL/UL (ref 3.8–5.4)
RBC #/AREA URNS AUTO: ABNORMAL HPF
SODIUM SERPL-SCNC: 141 MMOL/L (ref 136–145)
SP GR UR STRIP: 1.01 (ref 1–1.03)
SQUAMOUS #/AREA URNS AUTO: ABNORMAL LPF
UROBILINOGEN UR STRIP-ACNC: ABNORMAL
WBC #/AREA URNS AUTO: ABNORMAL HPF
WBC: 5.1 THOU/UL (ref 4–11)

## 2018-04-19 ASSESSMENT — MIFFLIN-ST. JEOR: SCORE: 1206.59

## 2018-04-20 LAB
C TRACH DNA SPEC QL PROBE+SIG AMP: NEGATIVE
N GONORRHOEA DNA SPEC QL NAA+PROBE: NEGATIVE

## 2018-04-27 ENCOUNTER — COMMUNICATION - HEALTHEAST (OUTPATIENT)
Dept: FAMILY MEDICINE | Facility: CLINIC | Age: 23
End: 2018-04-27

## 2018-07-19 ENCOUNTER — OFFICE VISIT - HEALTHEAST (OUTPATIENT)
Dept: FAMILY MEDICINE | Facility: CLINIC | Age: 23
End: 2018-07-19

## 2018-07-19 DIAGNOSIS — R10.9 RIGHT SIDED ABDOMINAL PAIN: ICD-10-CM

## 2018-07-19 DIAGNOSIS — N97.9 INFERTILITY, FEMALE: ICD-10-CM

## 2018-07-19 DIAGNOSIS — Z87.448 HISTORY OF HEMATURIA: ICD-10-CM

## 2018-07-19 LAB
ALBUMIN UR-MCNC: NEGATIVE MG/DL
APPEARANCE UR: CLEAR
BILIRUB UR QL STRIP: NEGATIVE
COLOR UR AUTO: YELLOW
GLUCOSE UR STRIP-MCNC: NEGATIVE MG/DL
HGB UR QL STRIP: NEGATIVE
KETONES UR STRIP-MCNC: NEGATIVE MG/DL
LEUKOCYTE ESTERASE UR QL STRIP: NEGATIVE
NITRATE UR QL: NEGATIVE
PH UR STRIP: 6 [PH] (ref 5–8)
SP GR UR STRIP: 1.02 (ref 1–1.03)
UROBILINOGEN UR STRIP-ACNC: NORMAL

## 2018-07-19 ASSESSMENT — MIFFLIN-ST. JEOR: SCORE: 1190.72

## 2018-07-20 ENCOUNTER — HOSPITAL ENCOUNTER (OUTPATIENT)
Dept: ULTRASOUND IMAGING | Facility: HOSPITAL | Age: 23
Discharge: HOME | End: 2018-07-20
Attending: FAMILY MEDICINE

## 2018-07-20 DIAGNOSIS — N97.9 INFERTILITY, FEMALE: ICD-10-CM

## 2018-07-20 DIAGNOSIS — R10.9 RIGHT SIDED ABDOMINAL PAIN: ICD-10-CM

## 2018-07-23 ENCOUNTER — COMMUNICATION - HEALTHEAST (OUTPATIENT)
Dept: FAMILY MEDICINE | Facility: CLINIC | Age: 23
End: 2018-07-23

## 2018-08-02 ENCOUNTER — OFFICE VISIT - HEALTHEAST (OUTPATIENT)
Dept: FAMILY MEDICINE | Facility: CLINIC | Age: 23
End: 2018-08-02

## 2018-08-02 DIAGNOSIS — R10.31 RLQ ABDOMINAL PAIN: ICD-10-CM

## 2018-08-02 DIAGNOSIS — N97.9 INFERTILITY, FEMALE: ICD-10-CM

## 2018-08-02 ASSESSMENT — MIFFLIN-ST. JEOR: SCORE: 1190.72

## 2018-08-09 ENCOUNTER — HOSPITAL ENCOUNTER (OUTPATIENT)
Dept: CT IMAGING | Facility: HOSPITAL | Age: 23
Discharge: HOME | End: 2018-08-09
Attending: FAMILY MEDICINE

## 2018-08-09 DIAGNOSIS — R10.31 RLQ ABDOMINAL PAIN: ICD-10-CM

## 2018-08-17 ENCOUNTER — COMMUNICATION - HEALTHEAST (OUTPATIENT)
Dept: FAMILY MEDICINE | Facility: CLINIC | Age: 23
End: 2018-08-17

## 2018-08-23 ENCOUNTER — OFFICE VISIT - HEALTHEAST (OUTPATIENT)
Dept: FAMILY MEDICINE | Facility: CLINIC | Age: 23
End: 2018-08-23

## 2018-08-23 DIAGNOSIS — G89.29 CHRONIC PELVIC PAIN IN FEMALE: ICD-10-CM

## 2018-08-23 DIAGNOSIS — N89.8 VAGINAL DISCHARGE: ICD-10-CM

## 2018-08-23 DIAGNOSIS — N97.9 INFERTILITY, FEMALE: ICD-10-CM

## 2018-08-23 DIAGNOSIS — R10.2 CHRONIC PELVIC PAIN IN FEMALE: ICD-10-CM

## 2018-08-23 LAB
CLUE CELLS: NORMAL
TRICHOMONAS, WET PREP: NORMAL
YEAST, WET PREP: NORMAL

## 2018-08-23 ASSESSMENT — MIFFLIN-ST. JEOR: SCORE: 1195.25

## 2018-08-24 LAB
C TRACH DNA SPEC QL PROBE+SIG AMP: NEGATIVE
N GONORRHOEA DNA SPEC QL NAA+PROBE: NEGATIVE

## 2018-09-04 ENCOUNTER — RECORDS - HEALTHEAST (OUTPATIENT)
Dept: ADMINISTRATIVE | Facility: OTHER | Age: 23
End: 2018-09-04

## 2018-09-06 ENCOUNTER — RECORDS - HEALTHEAST (OUTPATIENT)
Dept: ADMINISTRATIVE | Facility: OTHER | Age: 23
End: 2018-09-06

## 2018-09-20 ENCOUNTER — OFFICE VISIT - HEALTHEAST (OUTPATIENT)
Dept: FAMILY MEDICINE | Facility: CLINIC | Age: 23
End: 2018-09-20

## 2018-09-20 DIAGNOSIS — Z01.818 PREOPERATIVE EXAMINATION: ICD-10-CM

## 2018-09-20 LAB
ERYTHROCYTE [DISTWIDTH] IN BLOOD BY AUTOMATED COUNT: 10.6 % (ref 11–14.5)
HCG UR QL: NEGATIVE
HCT VFR BLD AUTO: 40 % (ref 35–47)
HGB BLD-MCNC: 13.4 G/DL (ref 12–16)
MCH RBC QN AUTO: 30.9 PG (ref 27–34)
MCHC RBC AUTO-ENTMCNC: 33.6 G/DL (ref 32–36)
MCV RBC AUTO: 92 FL (ref 80–100)
PLATELET # BLD AUTO: 203 THOU/UL (ref 140–440)
PMV BLD AUTO: 8.3 FL (ref 7–10)
RBC # BLD AUTO: 4.35 MILL/UL (ref 3.8–5.4)
SP GR UR STRIP: 1.02 (ref 1–1.03)
WBC: 6.3 THOU/UL (ref 4–11)

## 2018-09-20 ASSESSMENT — MIFFLIN-ST. JEOR: SCORE: 1184.48

## 2018-09-22 ENCOUNTER — ANESTHESIA - HEALTHEAST (OUTPATIENT)
Dept: SURGERY | Facility: AMBULATORY SURGERY CENTER | Age: 23
End: 2018-09-22

## 2018-09-24 ASSESSMENT — MIFFLIN-ST. JEOR: SCORE: 1182.21

## 2018-09-25 ENCOUNTER — SURGERY - HEALTHEAST (OUTPATIENT)
Dept: SURGERY | Facility: AMBULATORY SURGERY CENTER | Age: 23
End: 2018-09-25

## 2018-09-25 ASSESSMENT — MIFFLIN-ST. JEOR: SCORE: 1182.21

## 2018-10-29 ENCOUNTER — OFFICE VISIT - HEALTHEAST (OUTPATIENT)
Dept: FAMILY MEDICINE | Facility: CLINIC | Age: 23
End: 2018-10-29

## 2018-10-29 ENCOUNTER — COMMUNICATION - HEALTHEAST (OUTPATIENT)
Dept: SCHEDULING | Facility: CLINIC | Age: 23
End: 2018-10-29

## 2018-10-29 DIAGNOSIS — L70.0 ACNE VULGARIS: ICD-10-CM

## 2018-10-29 ASSESSMENT — MIFFLIN-ST. JEOR: SCORE: 1183.34

## 2019-01-03 ENCOUNTER — COMMUNICATION - HEALTHEAST (OUTPATIENT)
Dept: FAMILY MEDICINE | Facility: CLINIC | Age: 24
End: 2019-01-03

## 2019-01-04 ENCOUNTER — OFFICE VISIT - HEALTHEAST (OUTPATIENT)
Dept: FAMILY MEDICINE | Facility: CLINIC | Age: 24
End: 2019-01-04

## 2019-01-04 DIAGNOSIS — N91.2 AMENORRHEA: ICD-10-CM

## 2019-01-04 DIAGNOSIS — Z34.90 PREGNANCY, UNSPECIFIED GESTATIONAL AGE: ICD-10-CM

## 2019-01-04 DIAGNOSIS — O21.9 NAUSEA AND VOMITING IN PREGNANCY: ICD-10-CM

## 2019-01-04 DIAGNOSIS — Z86.19 HISTORY OF CHLAMYDIA: ICD-10-CM

## 2019-01-04 LAB — HCG UR QL: POSITIVE

## 2019-01-04 ASSESSMENT — MIFFLIN-ST. JEOR: SCORE: 1173.14

## 2019-01-11 ENCOUNTER — RECORDS - HEALTHEAST (OUTPATIENT)
Dept: ADMINISTRATIVE | Facility: OTHER | Age: 24
End: 2019-01-11

## 2019-01-18 ENCOUNTER — PRENATAL OFFICE VISIT - HEALTHEAST (OUTPATIENT)
Dept: FAMILY MEDICINE | Facility: CLINIC | Age: 24
End: 2019-01-18

## 2019-01-18 ENCOUNTER — COMMUNICATION - HEALTHEAST (OUTPATIENT)
Dept: NURSING | Facility: CLINIC | Age: 24
End: 2019-01-18

## 2019-01-18 DIAGNOSIS — Z34.90 PREGNANCY, UNSPECIFIED GESTATIONAL AGE: ICD-10-CM

## 2019-01-18 LAB
ALBUMIN UR-MCNC: NEGATIVE MG/DL
AMPHETAMINES UR QL SCN: NORMAL
APPEARANCE UR: CLEAR
BARBITURATES UR QL: NORMAL
BASOPHILS # BLD AUTO: 0 THOU/UL (ref 0–0.2)
BASOPHILS NFR BLD AUTO: 0 % (ref 0–2)
BENZODIAZ UR QL: NORMAL
BILIRUB UR QL STRIP: NEGATIVE
CANNABINOIDS UR QL SCN: NORMAL
COCAINE UR QL: NORMAL
COLLECTION METHOD: NORMAL
COLOR UR AUTO: YELLOW
CREAT UR-MCNC: 152 MG/DL
EOSINOPHIL # BLD AUTO: 0 THOU/UL (ref 0–0.4)
EOSINOPHIL NFR BLD AUTO: 0 % (ref 0–6)
ERYTHROCYTE [DISTWIDTH] IN BLOOD BY AUTOMATED COUNT: 11.9 % (ref 11–14.5)
GLUCOSE UR STRIP-MCNC: NEGATIVE MG/DL
HBV SURFACE AG SERPL QL IA: NEGATIVE
HCT VFR BLD AUTO: 36.5 % (ref 35–47)
HGB BLD-MCNC: 12.4 G/DL (ref 12–16)
HGB UR QL STRIP: NEGATIVE
HIV 1+2 AB+HIV1 P24 AG SERPL QL IA: NEGATIVE
KETONES UR STRIP-MCNC: ABNORMAL MG/DL
LEAD BLD-MCNC: NORMAL UG/DL
LEAD RETEST: NO
LEUKOCYTE ESTERASE UR QL STRIP: NEGATIVE
LYMPHOCYTES # BLD AUTO: 1.9 THOU/UL (ref 0.8–4.4)
LYMPHOCYTES NFR BLD AUTO: 24 % (ref 20–40)
MCH RBC QN AUTO: 31.1 PG (ref 27–34)
MCHC RBC AUTO-ENTMCNC: 34 G/DL (ref 32–36)
MCV RBC AUTO: 92 FL (ref 80–100)
MONOCYTES # BLD AUTO: 0.5 THOU/UL (ref 0–0.9)
MONOCYTES NFR BLD AUTO: 7 % (ref 2–10)
NEUTROPHILS # BLD AUTO: 5.3 THOU/UL (ref 2–7.7)
NEUTROPHILS NFR BLD AUTO: 68 % (ref 50–70)
NITRATE UR QL: NEGATIVE
OPIATES UR QL SCN: NORMAL
OXYCODONE UR QL: NORMAL
PCP UR QL SCN: NORMAL
PH UR STRIP: 6 [PH] (ref 5–8)
PLATELET # BLD AUTO: 202 THOU/UL (ref 140–440)
PMV BLD AUTO: 10.9 FL (ref 8.5–12.5)
RBC # BLD AUTO: 3.99 MILL/UL (ref 3.8–5.4)
RUBV IGG SERPL QL IA: POSITIVE
SP GR UR STRIP: 1.02 (ref 1–1.03)
T PALLIDUM AB SER QL: NEGATIVE
UROBILINOGEN UR STRIP-ACNC: ABNORMAL
WBC: 7.8 THOU/UL (ref 4–11)

## 2019-01-18 ASSESSMENT — MIFFLIN-ST. JEOR: SCORE: 1168.6

## 2019-01-19 LAB
ANTIBODY SCREEN: NEGATIVE
BACTERIA SPEC CULT: NO GROWTH

## 2019-01-21 LAB
ABO/RH(D): NORMAL
ABORH REPEAT: NORMAL

## 2019-01-22 LAB — LEAD BLDV-MCNC: <2 UG/DL (ref 0–4.9)

## 2019-02-15 ENCOUNTER — RECORDS - HEALTHEAST (OUTPATIENT)
Dept: ADMINISTRATIVE | Facility: OTHER | Age: 24
End: 2019-02-15

## 2019-02-15 ENCOUNTER — PRENATAL OFFICE VISIT - HEALTHEAST (OUTPATIENT)
Dept: FAMILY MEDICINE | Facility: CLINIC | Age: 24
End: 2019-02-15

## 2019-02-15 DIAGNOSIS — Z34.90 PREGNANCY, UNSPECIFIED GESTATIONAL AGE: ICD-10-CM

## 2019-02-15 DIAGNOSIS — Z86.19 HISTORY OF CHLAMYDIA: ICD-10-CM

## 2019-02-15 ASSESSMENT — MIFFLIN-ST. JEOR: SCORE: 1177.67

## 2019-02-18 LAB
C TRACH DNA SPEC QL PROBE+SIG AMP: NEGATIVE
N GONORRHOEA DNA SPEC QL NAA+PROBE: NEGATIVE

## 2019-03-15 ENCOUNTER — AMBULATORY - HEALTHEAST (OUTPATIENT)
Dept: NURSING | Facility: CLINIC | Age: 24
End: 2019-03-15

## 2019-03-15 ENCOUNTER — PRENATAL OFFICE VISIT - HEALTHEAST (OUTPATIENT)
Dept: FAMILY MEDICINE | Facility: CLINIC | Age: 24
End: 2019-03-15

## 2019-03-15 DIAGNOSIS — Z34.90 PREGNANCY, UNSPECIFIED GESTATIONAL AGE: ICD-10-CM

## 2019-03-15 ASSESSMENT — MIFFLIN-ST. JEOR: SCORE: 1183.91

## 2019-03-18 ENCOUNTER — COMMUNICATION - HEALTHEAST (OUTPATIENT)
Dept: CARE COORDINATION | Facility: CLINIC | Age: 24
End: 2019-03-18

## 2019-03-22 ENCOUNTER — AMBULATORY - HEALTHEAST (OUTPATIENT)
Dept: CARE COORDINATION | Facility: CLINIC | Age: 24
End: 2019-03-22

## 2019-03-22 DIAGNOSIS — Z71.89 COUNSELING AND COORDINATION OF CARE: ICD-10-CM

## 2019-04-05 ENCOUNTER — RECORDS - HEALTHEAST (OUTPATIENT)
Dept: ADMINISTRATIVE | Facility: OTHER | Age: 24
End: 2019-04-05

## 2019-04-15 ENCOUNTER — PRENATAL OFFICE VISIT - HEALTHEAST (OUTPATIENT)
Dept: FAMILY MEDICINE | Facility: CLINIC | Age: 24
End: 2019-04-15

## 2019-04-15 DIAGNOSIS — Z34.90 PREGNANCY, UNSPECIFIED GESTATIONAL AGE: ICD-10-CM

## 2019-04-15 ASSESSMENT — MIFFLIN-ST. JEOR: SCORE: 1209.43

## 2019-04-19 ENCOUNTER — COMMUNICATION - HEALTHEAST (OUTPATIENT)
Dept: CARE COORDINATION | Facility: CLINIC | Age: 24
End: 2019-04-19

## 2019-05-01 ENCOUNTER — COMMUNICATION - HEALTHEAST (OUTPATIENT)
Dept: CARE COORDINATION | Facility: CLINIC | Age: 24
End: 2019-05-01

## 2019-05-10 ENCOUNTER — RECORDS - HEALTHEAST (OUTPATIENT)
Dept: ADMINISTRATIVE | Facility: OTHER | Age: 24
End: 2019-05-10

## 2019-05-17 ENCOUNTER — PRENATAL OFFICE VISIT - HEALTHEAST (OUTPATIENT)
Dept: FAMILY MEDICINE | Facility: CLINIC | Age: 24
End: 2019-05-17

## 2019-05-17 DIAGNOSIS — N89.8 VAGINAL DISCHARGE: ICD-10-CM

## 2019-05-17 DIAGNOSIS — B37.31 YEAST VAGINITIS: ICD-10-CM

## 2019-05-17 DIAGNOSIS — Z34.90 PREGNANCY, UNSPECIFIED GESTATIONAL AGE: ICD-10-CM

## 2019-05-17 LAB
CLUE CELLS: ABNORMAL
FASTING STATUS PATIENT QL REPORTED: NO
GLUCOSE 1H P 50 G GLC PO SERPL-MCNC: 121 MG/DL (ref 70–139)
HGB BLD-MCNC: 12.1 G/DL (ref 12–16)
TRICHOMONAS, WET PREP: ABNORMAL
YEAST, WET PREP: ABNORMAL

## 2019-05-17 ASSESSMENT — MIFFLIN-ST. JEOR: SCORE: 1232.11

## 2019-05-18 LAB — T PALLIDUM AB SER QL: NEGATIVE

## 2019-05-20 LAB
C TRACH DNA SPEC QL PROBE+SIG AMP: NEGATIVE
N GONORRHOEA DNA SPEC QL NAA+PROBE: NEGATIVE

## 2019-05-24 ENCOUNTER — PRENATAL OFFICE VISIT - HEALTHEAST (OUTPATIENT)
Dept: FAMILY MEDICINE | Facility: CLINIC | Age: 24
End: 2019-05-24

## 2019-05-24 DIAGNOSIS — Z34.90 PREGNANCY, UNSPECIFIED GESTATIONAL AGE: ICD-10-CM

## 2019-05-24 ASSESSMENT — MIFFLIN-ST. JEOR: SCORE: 1241.18

## 2019-06-07 ENCOUNTER — PRENATAL OFFICE VISIT - HEALTHEAST (OUTPATIENT)
Dept: FAMILY MEDICINE | Facility: CLINIC | Age: 24
End: 2019-06-07

## 2019-06-07 DIAGNOSIS — Z34.90 PREGNANCY, UNSPECIFIED GESTATIONAL AGE: ICD-10-CM

## 2019-06-07 ASSESSMENT — MIFFLIN-ST. JEOR: SCORE: 1245.71

## 2019-06-21 ENCOUNTER — PRENATAL OFFICE VISIT - HEALTHEAST (OUTPATIENT)
Dept: FAMILY MEDICINE | Facility: CLINIC | Age: 24
End: 2019-06-21

## 2019-06-21 DIAGNOSIS — O35.EXX0 PYELECTASIS OF FETUS ON PRENATAL ULTRASOUND: ICD-10-CM

## 2019-06-21 DIAGNOSIS — Z34.90 PREGNANCY, UNSPECIFIED GESTATIONAL AGE: ICD-10-CM

## 2019-06-21 ASSESSMENT — MIFFLIN-ST. JEOR: SCORE: 1259.32

## 2019-07-05 ENCOUNTER — PRENATAL OFFICE VISIT - HEALTHEAST (OUTPATIENT)
Dept: FAMILY MEDICINE | Facility: CLINIC | Age: 24
End: 2019-07-05

## 2019-07-05 DIAGNOSIS — O35.EXX0 PYELECTASIS OF FETUS ON PRENATAL ULTRASOUND: ICD-10-CM

## 2019-07-05 ASSESSMENT — MIFFLIN-ST. JEOR: SCORE: 1277.47

## 2019-07-15 ENCOUNTER — RECORDS - HEALTHEAST (OUTPATIENT)
Dept: ADMINISTRATIVE | Facility: OTHER | Age: 24
End: 2019-07-15

## 2019-07-19 ENCOUNTER — PRENATAL OFFICE VISIT - HEALTHEAST (OUTPATIENT)
Dept: FAMILY MEDICINE | Facility: CLINIC | Age: 24
End: 2019-07-19

## 2019-07-19 DIAGNOSIS — Z34.90 PREGNANCY, UNSPECIFIED GESTATIONAL AGE: ICD-10-CM

## 2019-07-19 DIAGNOSIS — O35.EXX0 PYELECTASIS OF FETUS ON PRENATAL ULTRASOUND: ICD-10-CM

## 2019-07-19 ASSESSMENT — MIFFLIN-ST. JEOR: SCORE: 1282

## 2019-07-20 LAB
ALLERGIC TO PENICILLIN: NORMAL
GP B STREP DNA SPEC QL NAA+PROBE: NEGATIVE

## 2019-07-26 ENCOUNTER — PRENATAL OFFICE VISIT - HEALTHEAST (OUTPATIENT)
Dept: FAMILY MEDICINE | Facility: CLINIC | Age: 24
End: 2019-07-26

## 2019-07-26 DIAGNOSIS — O35.EXX0 PYELECTASIS OF FETUS ON PRENATAL ULTRASOUND: ICD-10-CM

## 2019-07-26 ASSESSMENT — MIFFLIN-ST. JEOR: SCORE: 1291.07

## 2019-08-02 ENCOUNTER — PRENATAL OFFICE VISIT - HEALTHEAST (OUTPATIENT)
Dept: FAMILY MEDICINE | Facility: CLINIC | Age: 24
End: 2019-08-02

## 2019-08-02 DIAGNOSIS — Z34.90 PREGNANCY, UNSPECIFIED GESTATIONAL AGE: ICD-10-CM

## 2019-08-02 ASSESSMENT — MIFFLIN-ST. JEOR: SCORE: 1300.15

## 2019-08-09 ENCOUNTER — PRENATAL OFFICE VISIT - HEALTHEAST (OUTPATIENT)
Dept: FAMILY MEDICINE | Facility: CLINIC | Age: 24
End: 2019-08-09

## 2019-08-09 DIAGNOSIS — Z34.90 PREGNANCY, UNSPECIFIED GESTATIONAL AGE: ICD-10-CM

## 2019-08-09 ASSESSMENT — MIFFLIN-ST. JEOR: SCORE: 1313.75

## 2019-08-16 ENCOUNTER — PRENATAL OFFICE VISIT - HEALTHEAST (OUTPATIENT)
Dept: FAMILY MEDICINE | Facility: CLINIC | Age: 24
End: 2019-08-16

## 2019-08-16 DIAGNOSIS — Z34.90 PREGNANCY, UNSPECIFIED GESTATIONAL AGE: ICD-10-CM

## 2019-08-16 DIAGNOSIS — H04.123 DRY EYES: ICD-10-CM

## 2019-08-16 ASSESSMENT — MIFFLIN-ST. JEOR: SCORE: 1304.68

## 2019-08-23 ENCOUNTER — PRENATAL OFFICE VISIT - HEALTHEAST (OUTPATIENT)
Dept: FAMILY MEDICINE | Facility: CLINIC | Age: 24
End: 2019-08-23

## 2019-08-23 ENCOUNTER — HOSPITAL ENCOUNTER (OUTPATIENT)
Dept: ULTRASOUND IMAGING | Facility: HOSPITAL | Age: 24
Discharge: HOME | End: 2019-08-23
Attending: FAMILY MEDICINE

## 2019-08-23 DIAGNOSIS — O48.0 POST-TERM PREGNANCY, 40-42 WEEKS OF GESTATION: ICD-10-CM

## 2019-08-23 ASSESSMENT — MIFFLIN-ST. JEOR: SCORE: 1309.22

## 2019-08-26 ENCOUNTER — HOME CARE/HOSPICE - HEALTHEAST (OUTPATIENT)
Dept: HOME HEALTH SERVICES | Facility: HOME HEALTH | Age: 24
End: 2019-08-26

## 2019-08-27 ENCOUNTER — HOME CARE/HOSPICE - HEALTHEAST (OUTPATIENT)
Dept: HOME HEALTH SERVICES | Facility: HOME HEALTH | Age: 24
End: 2019-08-27

## 2019-10-10 ENCOUNTER — OFFICE VISIT - HEALTHEAST (OUTPATIENT)
Dept: FAMILY MEDICINE | Facility: CLINIC | Age: 24
End: 2019-10-10

## 2019-10-10 DIAGNOSIS — G43.001 MIGRAINE WITHOUT AURA AND WITH STATUS MIGRAINOSUS, NOT INTRACTABLE: ICD-10-CM

## 2019-10-10 DIAGNOSIS — Z23 NEED FOR IMMUNIZATION AGAINST INFLUENZA: ICD-10-CM

## 2019-10-10 DIAGNOSIS — R20.2 PARESTHESIAS: ICD-10-CM

## 2019-10-10 DIAGNOSIS — R35.0 URINARY FREQUENCY: ICD-10-CM

## 2019-10-10 DIAGNOSIS — Z34.90 PREGNANCY, UNSPECIFIED GESTATIONAL AGE: ICD-10-CM

## 2019-10-10 DIAGNOSIS — Z30.9 CONTRACEPTION MANAGEMENT: ICD-10-CM

## 2019-10-10 LAB
ALBUMIN UR-MCNC: ABNORMAL MG/DL
ANION GAP SERPL CALCULATED.3IONS-SCNC: 10 MMOL/L (ref 5–18)
APPEARANCE UR: ABNORMAL
BACTERIA #/AREA URNS HPF: ABNORMAL HPF
BILIRUB UR QL STRIP: NEGATIVE
BUN SERPL-MCNC: 10 MG/DL (ref 8–22)
CALCIUM SERPL-MCNC: 9.1 MG/DL (ref 8.5–10.5)
CHLORIDE BLD-SCNC: 102 MMOL/L (ref 98–107)
CO2 SERPL-SCNC: 29 MMOL/L (ref 22–31)
COLOR UR AUTO: YELLOW
CREAT SERPL-MCNC: 0.75 MG/DL (ref 0.6–1.1)
ERYTHROCYTE [DISTWIDTH] IN BLOOD BY AUTOMATED COUNT: 11 % (ref 11–14.5)
GFR SERPL CREATININE-BSD FRML MDRD: >60 ML/MIN/1.73M2
GLUCOSE BLD-MCNC: 78 MG/DL (ref 70–125)
GLUCOSE UR STRIP-MCNC: NEGATIVE MG/DL
HBA1C MFR BLD: 5 % (ref 3.5–6)
HCT VFR BLD AUTO: 37.7 % (ref 35–47)
HGB BLD-MCNC: 13 G/DL (ref 12–16)
HGB UR QL STRIP: NEGATIVE
KETONES UR STRIP-MCNC: NEGATIVE MG/DL
LEUKOCYTE ESTERASE UR QL STRIP: NEGATIVE
MCH RBC QN AUTO: 31.3 PG (ref 27–34)
MCHC RBC AUTO-ENTMCNC: 34.4 G/DL (ref 32–36)
MCV RBC AUTO: 91 FL (ref 80–100)
NITRATE UR QL: NEGATIVE
PH UR STRIP: 7 [PH] (ref 5–8)
PLATELET # BLD AUTO: 193 THOU/UL (ref 140–440)
PMV BLD AUTO: 8.3 FL (ref 7–10)
POTASSIUM BLD-SCNC: 2.3 MMOL/L (ref 3.5–5)
RBC # BLD AUTO: 4.14 MILL/UL (ref 3.8–5.4)
RBC #/AREA URNS AUTO: ABNORMAL HPF
SODIUM SERPL-SCNC: 141 MMOL/L (ref 136–145)
SP GR UR STRIP: 1.01 (ref 1–1.03)
SQUAMOUS #/AREA URNS AUTO: ABNORMAL LPF
TSH SERPL DL<=0.005 MIU/L-ACNC: 1.27 UIU/ML (ref 0.3–5)
UROBILINOGEN UR STRIP-ACNC: ABNORMAL
VIT B12 SERPL-MCNC: 724 PG/ML (ref 213–816)
WBC #/AREA URNS AUTO: ABNORMAL HPF
WBC: 4.4 THOU/UL (ref 4–11)

## 2019-10-10 RX ORDER — ACETAMINOPHEN 500 MG
500-1000 TABLET ORAL DAILY PRN
Qty: 100 TABLET | Refills: 2 | Status: SHIPPED | OUTPATIENT
Start: 2019-10-10 | End: 2021-09-03

## 2019-10-10 ASSESSMENT — MIFFLIN-ST. JEOR: SCORE: 1159.53

## 2019-10-18 ENCOUNTER — COMMUNICATION - HEALTHEAST (OUTPATIENT)
Dept: FAMILY MEDICINE | Facility: CLINIC | Age: 24
End: 2019-10-18

## 2019-10-25 ENCOUNTER — OFFICE VISIT - HEALTHEAST (OUTPATIENT)
Dept: FAMILY MEDICINE | Facility: CLINIC | Age: 24
End: 2019-10-25

## 2019-10-25 DIAGNOSIS — Z86.39 HISTORY OF HYPOKALEMIA: ICD-10-CM

## 2019-10-25 DIAGNOSIS — E87.6 HYPOKALEMIA: ICD-10-CM

## 2019-10-25 LAB
ANION GAP SERPL CALCULATED.3IONS-SCNC: 9 MMOL/L (ref 5–18)
BUN SERPL-MCNC: 16 MG/DL (ref 8–22)
CALCIUM SERPL-MCNC: 10.4 MG/DL (ref 8.5–10.5)
CHLORIDE BLD-SCNC: 104 MMOL/L (ref 98–107)
CO2 SERPL-SCNC: 26 MMOL/L (ref 22–31)
CREAT SERPL-MCNC: 0.82 MG/DL (ref 0.6–1.1)
GFR SERPL CREATININE-BSD FRML MDRD: >60 ML/MIN/1.73M2
GLUCOSE BLD-MCNC: 84 MG/DL (ref 70–125)
POTASSIUM BLD-SCNC: 4.2 MMOL/L (ref 3.5–5)
SODIUM SERPL-SCNC: 139 MMOL/L (ref 136–145)

## 2019-10-25 ASSESSMENT — MIFFLIN-ST. JEOR: SCORE: 1150.46

## 2020-12-11 ENCOUNTER — OFFICE VISIT - HEALTHEAST (OUTPATIENT)
Dept: FAMILY MEDICINE | Facility: CLINIC | Age: 25
End: 2020-12-11

## 2020-12-11 DIAGNOSIS — N92.6 MISSED PERIOD: ICD-10-CM

## 2020-12-11 DIAGNOSIS — Z23 NEED FOR IMMUNIZATION AGAINST INFLUENZA: ICD-10-CM

## 2020-12-11 DIAGNOSIS — Z34.90 PREGNANCY, UNSPECIFIED GESTATIONAL AGE: ICD-10-CM

## 2020-12-11 LAB — HCG UR QL: POSITIVE

## 2020-12-11 ASSESSMENT — MIFFLIN-ST. JEOR: SCORE: 1240.71

## 2020-12-18 ENCOUNTER — HOSPITAL ENCOUNTER (OUTPATIENT)
Dept: ULTRASOUND IMAGING | Facility: HOSPITAL | Age: 25
Discharge: HOME | End: 2020-12-18
Attending: FAMILY MEDICINE

## 2020-12-18 ENCOUNTER — COMMUNICATION - HEALTHEAST (OUTPATIENT)
Dept: FAMILY MEDICINE | Facility: CLINIC | Age: 25
End: 2020-12-18

## 2020-12-18 DIAGNOSIS — Z34.90 PREGNANCY, UNSPECIFIED GESTATIONAL AGE: ICD-10-CM

## 2020-12-23 ENCOUNTER — OFFICE VISIT - HEALTHEAST (OUTPATIENT)
Dept: FAMILY MEDICINE | Facility: CLINIC | Age: 25
End: 2020-12-23

## 2020-12-23 DIAGNOSIS — O02.89 NON-VIABLE PREGNANCY: ICD-10-CM

## 2020-12-29 ENCOUNTER — RECORDS - HEALTHEAST (OUTPATIENT)
Dept: ADMINISTRATIVE | Facility: OTHER | Age: 25
End: 2020-12-29

## 2020-12-29 ENCOUNTER — AMBULATORY - HEALTHEAST (OUTPATIENT)
Dept: SURGERY | Facility: AMBULATORY SURGERY CENTER | Age: 25
End: 2020-12-29

## 2020-12-29 DIAGNOSIS — Z11.59 ENCOUNTER FOR SCREENING FOR OTHER VIRAL DISEASES: ICD-10-CM

## 2021-01-02 ENCOUNTER — AMBULATORY - HEALTHEAST (OUTPATIENT)
Dept: FAMILY MEDICINE | Facility: CLINIC | Age: 26
End: 2021-01-02

## 2021-01-02 DIAGNOSIS — Z11.59 ENCOUNTER FOR SCREENING FOR OTHER VIRAL DISEASES: ICD-10-CM

## 2021-01-03 LAB
SARS-COV-2 PCR COMMENT: NORMAL
SARS-COV-2 RNA SPEC QL NAA+PROBE: NEGATIVE
SARS-COV-2 VIRUS SPECIMEN SOURCE: NORMAL

## 2021-01-04 ENCOUNTER — COMMUNICATION - HEALTHEAST (OUTPATIENT)
Dept: SCHEDULING | Facility: CLINIC | Age: 26
End: 2021-01-04

## 2021-01-05 ENCOUNTER — ANESTHESIA - HEALTHEAST (OUTPATIENT)
Dept: SURGERY | Facility: AMBULATORY SURGERY CENTER | Age: 26
End: 2021-01-05

## 2021-01-05 ASSESSMENT — MIFFLIN-ST. JEOR: SCORE: 1236.18

## 2021-01-06 ENCOUNTER — SURGERY - HEALTHEAST (OUTPATIENT)
Dept: SURGERY | Facility: AMBULATORY SURGERY CENTER | Age: 26
End: 2021-01-06

## 2021-05-19 ENCOUNTER — OFFICE VISIT - HEALTHEAST (OUTPATIENT)
Dept: FAMILY MEDICINE | Facility: CLINIC | Age: 26
End: 2021-05-19

## 2021-05-19 ENCOUNTER — COMMUNICATION - HEALTHEAST (OUTPATIENT)
Dept: FAMILY MEDICINE | Facility: CLINIC | Age: 26
End: 2021-05-19

## 2021-05-19 DIAGNOSIS — Z32.00 ENCOUNTER FOR CONFIRMATION OF PREGNANCY TEST RESULT WITH PHYSICAL EXAMINATION: ICD-10-CM

## 2021-05-19 DIAGNOSIS — Z34.90 PREGNANCY, UNSPECIFIED GESTATIONAL AGE: ICD-10-CM

## 2021-05-19 LAB — HCG UR QL: POSITIVE

## 2021-05-19 ASSESSMENT — MIFFLIN-ST. JEOR: SCORE: 1306.81

## 2021-05-26 NOTE — PROGRESS NOTES
See FR notes in chart.   Geeta [facility] Financial Resource Guide  [phone]

## 2021-05-27 VITALS
WEIGHT: 152.6 LBS | RESPIRATION RATE: 12 BRPM | SYSTOLIC BLOOD PRESSURE: 100 MMHG | TEMPERATURE: 98.2 F | BODY MASS INDEX: 32.03 KG/M2 | DIASTOLIC BLOOD PRESSURE: 70 MMHG | OXYGEN SATURATION: 98 % | HEIGHT: 58 IN | HEART RATE: 77 BPM

## 2021-05-27 NOTE — PROGRESS NOTES
"SUBJECTIVE:  Colten Meade is a 24 y.o. female  at 22w0d by LMP c/w 8 wk US. Estimated Date of Delivery: 19.  She is doing well. She denies nausea and vomiting. She denies abdominal pain/cramping, vaginal bleeding, leakage of fluid. Fetal movement is present.   Concerns: Had US at [facility]- reviewed today- mild bilateral renal pyelectasis- will need repeat US  ROS  Negative except as noted above in HPI.  OBJECTIVE:  Blood pressure 124/64, pulse 80, temperature 98  F (36.7  C), temperature source Oral, resp. rate 16, height 4' 10\" (1.473 m), weight 130 lb (59 kg), last menstrual period 2018, SpO2 99 %, currently breastfeeding.  Gen: comfortable, no acute distress.  See OB Vitals flowsheet.  ASSESSMENT/PLAN:  Colten was seen today for routine prenatal visit.    Diagnoses and all orders for this visit:    Pregnancy, unspecified gestational age: Bilateral mild renal pelvis dilation R= 5.18 mm and L=4.09 mm. Otherwise normal anatomy scan and normal progenity screen in early pregnancy. Will repeat scan in 4 weeks to re-evaluate- set up this appointment today  -     Ambulatory referral to Obstetrics / Gynecology      -IUP at 22w0d:   Prenatal labs reviewed   Fetal survey was done- see above for details.   GTT at next appt  Peripartum Anesthesia: the patient does not desire an epidural during labor, wants to try natural  Post-partum Contraception: condoms   Breast/Bottle: breastfeed  RTC in 4 weeks or sooner with problems.    Visit completed along with assistance of [facility] .    Flor Stoddard MD    "

## 2021-05-28 NOTE — PROGRESS NOTES
Programs Applying For: SNAP  County:  Case #:  County Worker:   Nandini #:   PMI #:   Tracking:     Outreach:   2019: FRG called [facility] and spoke with Laina. Laina indicated the application has been assigned to financial worker, Greg. FRG called Greg to see what documentation is pending and what FRG can do to help. FRG left VM for financial worker and will call back in 2 weeks if financial worker does not call back.   CLOSED ENCOUNTER:  3/29/2019: FRG received bank statements from [facility] and faxed to [facility]. FRG will check status in 3 weeks on SNAP application.  3/22/2019: FRG met with patient at [facility] and applied for SNAP. Patient did not bring bank statements and will bring statements to [facility] next week. Patient will call FRG once she bring statements to clinic and FRG will fax to [facility].  3/18/2019: FRG called patient to screen/schedule. Appointment scheduled for 3/22 @     Health Insurance Information:     Referral:   ------- FRG ONLY REFERRAL ------     I believe the [facility] BARRERA Dickson sent a remind me message for this patient but I was not sure if you needed one that looked for official like this.     Patient just needs help applying for SNAP benefits. It looks like Yessi created a goal around it, so please complete/delete the goal once you have resolved the SNAP benefit concerns.    SNAP/CASH Application Screenin. Have you already for [facility] benefits before? Yes, patient had benefits in October but the family closed it because they had to provide too much information  2. How many people in household? 3  3. What is your monthly income (include all tax members)? $2,000  4. Do you have a bank account? Yes, patient will bring most current 3 months   5. Do you have any utility bills (electricity, rent, mortgage, phone, insurance, medical bills, etc.)? yes  6. Do you have social security cards and/or green cards?

## 2021-05-28 NOTE — PROGRESS NOTES
Programs Applying For: SNAP  County:  Case #: [id number]  [facility] Worker: Corinne # [phone]  Mnsure #:   PMI #:   Tracking:     Outreach:   5/13/2019: FRG checked [employer] info line, patient is now active with SNAP at $86.00 per month. FRG called patient, patient has EBT card and it is working. Patient was FRG only patient. Nothing for FRG to do at this time, taking patient off panel.   5/1/2019: FRG called patient to see if she [facility] any [facility] benefit paperwork regarding SNAP. Patient stated she received letter from the [facility] needing pay check from April. Patient sent it in this week. FRG will check in 2 weeks to make sure patient's SNAP is active, FRG will call financial worker and then call patient.  CLOSED ENCOUNTER:  4/19/2019: FRG called [facility] and spoke with Laina. Laina indicated the application has been assigned to financial worker, Greg. FRG called Greg to see what documentation is pending and what FRG can do to help. FRG left VM for financial worker and will call back in 2 weeks if financial worker does not call back.   CLOSED ENCOUNTER:  3/29/2019: FRG received bank statements from [facility] and faxed to [facility]. FRG will check status in 3 weeks on SNAP application.  3/22/2019: FRG met with patient at [facility] and applied for SNAP. Patient did not bring bank statements and will bring statements to [facility] next week. Patient will call FRG once she bring statements to clinic and FRG will fax to [facility].  3/18/2019: FRG called patient to screen/schedule. Appointment scheduled for 3/22 @ 10    Health Insurance Information:     Referral:   ------- FRG ONLY REFERRAL ------     I believe the [facility] BARRERA Dickson sent a remind me message for this patient but I was not sure if you needed one that looked for official like this.     Patient just needs help applying for SNAP benefits. It looks like Yessi created a goal around it, so please complete/delete the goal once you have  resolved the SNAP benefit concerns.    SNAP/CASH Application Screenin. Have you already for county benefits before? Yes, patient had benefits in October but the family closed it because they had to provide too much information  2. How many people in household? 3  3. What is your monthly income (include all tax members)? $2,000  4. Do you have a bank account? Yes, patient will bring most current 3 months   5. Do you have any utility bills (electricity, rent, mortgage, phone, insurance, medical bills, etc.)? yes  6. Do you have social security cards and/or green cards?

## 2021-05-28 NOTE — PROGRESS NOTES
"SUBJECTIVE:  Colten Meade is a 24 y.o. female  at 26w4d by LMP c/w 8 wk US. Estimated Date of Delivery: 19.  She is doing well. She denies  nausea and vomiting. She denies abdominal pain/cramping, vaginal bleeding, leakage of fluid. Fetal movement is present.   Concerns: None.  ROS  Negative except as noted above in HPI.  OBJECTIVE:  Blood pressure 112/66, pulse 96, temperature 98  F (36.7  C), temperature source Oral, resp. rate 16, height 4' 10\" (1.473 m), weight 135 lb (61.2 kg), last menstrual period 2018, currently breastfeeding.  Gen: comfortable, no acute distress.  See OB Vitals flowsheet.    ASSESSMENT/PLAN:  Colten was seen today for routine prenatal visit.    Diagnoses and all orders for this visit:    Pregnancy, unspecified gestational age  -     Glucose,Gestational Challenge (1 Hour)  -     Hemoglobin  -     Syphilis Screen, [place]  -     acetaminophen (TYLENOL) 325 MG tablet; Take 2 tablets (650 mg total) by mouth every 4 (four) hours as needed for pain.    Vaginal discharge: White vaginal discharge with itching. Wet prep positive for yeast- will treat.  -     Chlamydia trachomatis & Neisseria gonorrhoeae, Amplified Detection  -     Wet Prep, Vaginal  -     miconazole (MONISTAT 7) 2 % vaginal cream; Insert 1 applicator into the vagina at bedtime.      -IUP at 26w4d:   Prenatal labs reviewed   NIPT done and normal.    Fetal survey was done and persistent mild bilateral pyelectasis seen R 5.8 mm L 4 mm  GTT done toda  Peripartum Anesthesia: the patient does not desire an epidural during labor.   Post-partum Contraception: declines   Breast/Bottle: breast   RTC in 2 weeks or sooner with problems.    Visit completed along with assistance of [facility] .    Flor Stoddard MD    "

## 2021-05-29 NOTE — PROGRESS NOTES
"SUBJECTIVE:  Colten Meade is a 24 y.o. female  at 27w5d by LMP c/w 8 wk US. Estimated Date of Delivery: 19.  She is doing well. She denies vaginal bleeding, leakage of fluid, or urinary symptoms. Fetal movement is present. She is not having contractions.  Concerns: None.  ROS  Negative except as noted above in HPI  OBJECTIVE:  Blood pressure 120/68, pulse 80, temperature 98.2  F (36.8  C), temperature source Oral, resp. rate 16, height 4' 10\" (1.473 m), weight 137 lb (62.1 kg), last menstrual period 2018, currently breastfeeding.  Gen: Comfortable, no acute distress.  Abd: Gravid, non-tender  See OB Vitals flowsheet.  ASSESSMENT/PLAN:  Colten was seen today for routine prenatal visit.    Diagnoses and all orders for this visit:    Pregnancy, unspecified gestational age    Other orders  -     Tdap vaccine,  6yo or older,  IM      -IUP at 27w5d:   Prenatal labs reviewed   Fetal survey was done. Mild bilateral renal pyelectasis R 5.8 mm L 4 mm. Normal NIPT.   Peripartum Anesthesia: the patient does not desire an epidural during labor.   Post-partum Contraception: declines   Breast/Bottle: breastfeed   Reviewed plans for getting to the hospital  RTC in 2 weeks or sooner with problems.    Visit completed along with assistance of [facility] .    Flor Stoddard MD    "

## 2021-05-29 NOTE — PROGRESS NOTES
"SUBJECTIVE:  Colten Meade is a 24 y.o. female  at 31w4d by LMP c/w 8 wk US. Estimated Date of Delivery: 19.  She is doing well. She denies vaginal bleeding, leakage of fluid, or urinary symptoms. Fetal movement is present. She is not having contractions.  Concerns: Wondering about letter for her  to take off work for paternity leave  ROS  Negative except as noted above in HPI  OBJECTIVE:  Blood pressure 112/60, pulse 96, temperature 98.3  F (36.8  C), temperature source Oral, resp. rate 16, height 4' 10\" (1.473 m), weight 141 lb (64 kg), last menstrual period 2018, currently breastfeeding.  Gen: Comfortable, no acute distress.  Abd: Gravid, non-tender  See OB Vitals flowsheet.  ASSESSMENT/PLAN:  -IUP at 31w4d:   Prenatal labs reviewed  Hx of fetal demise at 4 months- normal NIPT   Fetal survey was done. Persistent mild bilateral renal pyelectasis R 5.8 mm L 4 mm.   Peripartum Anesthesia: the patient does not desire an epidural during labor.   Post-partum Contraception: declines   Breast/Bottle: breast   Reviewed plans for getting to the hospital.  RTC in 2 weeks or sooner with problems.    Visit completed along with assistance of [facility]     Flor Stoddard MD    "

## 2021-05-29 NOTE — PROGRESS NOTES
"SUBJECTIVE:  Colten Meade is a 24 y.o. female  at 29w4d. Estimated Date of Delivery: 19.  She is doing well. She denies vaginal bleeding, leakage of fluid, or urinary symptoms. Fetal movement is present. She is not having contractions.  Concerns: She used treatment for yeast and her vaginal discharge has resolved.  ROS  Negative except as noted above in HPI  OBJECTIVE:  Blood pressure 116/70, pulse 96, temperature 98.2  F (36.8  C), temperature source Oral, resp. rate 16, height 4' 10\" (1.473 m), weight 138 lb (62.6 kg), last menstrual period 2018, currently breastfeeding.  Gen: Comfortable, no acute distress.  Abd: Gravid, non-tender  See OB Vitals flowsheet.  ASSESSMENT/PLAN:  Colten was seen today for routine prenatal visit.    Diagnoses and all orders for this visit:    Pregnancy, unspecified gestational age      -IUP at 29w4d:   Prenatal labs reviewed   Hx of fetal demise at 4 months- normal cell free DNA  Fetal survey was done and showed mild bilateral renal pyelectais R 5.8 mm and L 4 mm- unchanged from previous ultrasound. Consider rechecking ultrasound around 33 weeks.   Peripartum Anesthesia: the patient does not desire an epidural during labor.   Post-partum Contraception: declines   Breast/Bottle: breastfeed   Reviewed plans for getting to the hospital.  RTC in 2 weeks or sooner with problems.    Visit completed along with assistance of [employer] .    Flor Stoddard MD    "

## 2021-05-30 NOTE — PROGRESS NOTES
"SUBJECTIVE:  Colten Meade is a 24 y.o. female  at 33w4d by LMP c/w 8 wk US. Estimated Date of Delivery: 19.  She is doing well. She denies vaginal bleeding, leakage of fluid, or urinary symptoms. Fetal movement is present. She is not having contractions.  Concerns: None.  ROS  Negative except as noted above in HPI  OBJECTIVE:  Blood pressure 116/70, pulse 78, temperature 97.6  F (36.4  C), temperature source Oral, resp. rate 20, height 4' 10\" (1.473 m), weight 145 lb (65.8 kg), last menstrual period 2018, SpO2 98 %, currently breastfeeding.  Gen: Comfortable, no acute distress.  Abd: Gravid, non-tender  See OB Vitals flowsheet.  ASSESSMENT/PLAN:  Colten was seen today for routine prenatal visit.    Diagnoses and all orders for this visit:    Pyelectasis of fetus on prenatal ultrasound: R 5.8 mm L 4 mm- will need repeat now in 3rd trimester to see if post-deyvi evaluation is needed. Referred for repeat US.  -     Ambulatory referral to Obstetrics / Gynecology      -IUP at 33w4d:   Prenatal labs reviewed  History of fetal demise at 4 months- normal NIPT this pregnancy  Peripartum Anesthesia: the patient does not desire an epidural during labor.   Post-partum Contraception: declines   Breast/Bottle: breast   RTC in 2 weeks or sooner with problems. GBS at next visit in 2 weeks    Visit completed along with assistance of [facility] .    Flor Stoddard MD    "

## 2021-05-30 NOTE — PROGRESS NOTES
"SUBJECTIVE:  Colten Meade is a 24 y.o. female  at 36w5d by LMP c/w 8 wk US. Estimated Date of Delivery: 19.  She is doing well. She denies vaginal bleeding, leakage of fluid, or urinary symptoms. Fetal movement is present. She is not having contractions.  Concerns: none   ROS  Negative except as noted above in HPI  OBJECTIVE:  Blood pressure 106/60, pulse 76, temperature 98.7  F (37.1  C), temperature source Oral, resp. rate 16, height 4' 10\" (1.473 m), weight 148 lb (67.1 kg), last menstrual period 2018, SpO2 97 %, currently breastfeeding.  Gen: Comfortable, no acute distress.  Abd: Gravid, non-tender  See OB Vitals flowsheet.  ASSESSMENT/PLAN:  Colten was seen today for routine prenatal visit.    Diagnoses and all orders for this visit:    Pyelectasis of fetus on prenatal ultrasound: Mild. 3rd trimester- <10 mm- no post- evaluation necessary.    -IUP at 36w5d:   Prenatal labs reviewed   GBS status is negative.   Peripartum Anesthesia: the patient does not desire an epidural during labor.   Post-partum Contraception: declines   Breast/Bottle: breast   Reviewed plans for getting to the hospital. Gave hospital form today  RTC in 1 weeks or sooner with problems.    Visit completed along with assistance of [employer] .    Flor Stoddard MD    "

## 2021-05-30 NOTE — PROGRESS NOTES
"SUBJECTIVE:  Colten Meade is a 24 y.o. female  at 35w4d by LMP c/w 8 wk US. Estimated Date of Delivery: 19.  She is doing well. She denies vaginal bleeding, leakage of fluid, or urinary symptoms. Fetal movement is present. She is not having contractions.  Concerns: Needs refill of prenatal vitamins. Had US at [facility] to re-evaluate fetal pyelectasis in third trimester- was reportedly normal.   ROS  Negative except as noted above in HPI  OBJECTIVE:  Blood pressure 110/62, pulse 68, temperature 98.1  F (36.7  C), temperature source Oral, resp. rate 16, height 4' 10\" (1.473 m), weight 146 lb (66.2 kg), last menstrual period 2018, currently breastfeeding.  Gen: Comfortable, no acute distress.  Abd: Gravid, non-tender  See OB Vitals flowsheet.    ASSESSMENT/PLAN:  Colten was seen today for routine prenatal visit.    Diagnoses and all orders for this visit:    Pregnancy, unspecified gestational age  -     Group B Strep Screen by PCR  -     prenat.vits,doug,min-iron-folic (PRENATAL VITAMIN) Tab; Take 1 tablet by mouth daily.    Fetal pyelectasis: Previously bilateral dilation R 5.8 L 4.4 mm. Had recent US which showed <10, no post-deyvi evaluation necessary.  -IUP at 35w4d:   Prenatal labs reviewed   Fetal survey was done and normal  GBS done today.   Peripartum Anesthesia: the patient mccauley snot desire an epidural during labor.   Post-partum Contraception: declines   Breast/Bottle: breastfeed   Reviewed plans for getting to the hospital. Hospital form at next visit  RTC in 1 week or sooner with problems.    Visit completed along with assistance of [facility] .    Flor Stoddard MD    "

## 2021-05-31 VITALS — HEIGHT: 58 IN | BODY MASS INDEX: 26.66 KG/M2 | WEIGHT: 127 LBS

## 2021-05-31 NOTE — PROGRESS NOTES
"SUBJECTIVE:  Colten Meade is a 24 y.o. female  at 37w4d by LMP c/w 8 wk US. Estimated Date of Delivery: 19.  She is doing well. She denies vaginal bleeding, leakage of fluid, or urinary symptoms. Fetal movement is present. She is not having contractions.  Concerns: None.  ROS  Negative except as noted above in HPI  OBJECTIVE:  Blood pressure 116/62, pulse 76, temperature 98.1  F (36.7  C), temperature source Oral, resp. rate 16, height 4' 10\" (1.473 m), weight 150 lb (68 kg), last menstrual period 2018, currently breastfeeding.  Gen: Comfortable, no acute distress.  Abd: Gravid, non-tender  See OB Vitals flowsheet.  ASSESSMENT/PLAN:  -IUP at 37w4d:   Prenatal labs reviewed   Fetal survey was done. Mild bilateral renal pyelectasis- stable- no post- workup needed.   GBS status is negative.   Peripartum Anesthesia: the patient does not desire an epidural during labor.   Post-partum Contraception: declines   Breast/Bottle: breastfeed   Reviewed plans for getting to the hospital. Has hospital form  RTC in 1 weeks or sooner with problems.    Visit completed along with assistance of [email or web address] .    Flor Stoddard MD    "

## 2021-05-31 NOTE — PROGRESS NOTES
"SUBJECTIVE:  Colten Meade is a 24 y.o. female  at 39w4d. Estimated Date of Delivery: 19.  She is doing well. She denies vaginal bleeding, leakage of fluid, or urinary symptoms. Fetal movement is present. She is having more pressure but no regular contractions.  Concerns: None  ROS  Negative except as noted above in HPI  OBJECTIVE:  Blood pressure 118/62, pulse 74, temperature 98  F (36.7  C), temperature source Oral, resp. rate 16, height 4' 10\" (1.473 m), weight 151 lb (68.5 kg), last menstrual period 2018, SpO2 98 %, currently breastfeeding.  Gen: Comfortable, no acute distress.  Abd: Gravid, non-tender  See OB Vitals flowsheet.  ASSESSMENT/PLAN:  Colten was seen today for routine prenatal visit.    Diagnoses and all orders for this visit:    Pregnancy, unspecified gestational age    Dry eyes:   -     artificial tears, hypromellose, (GONIOVISC) 2.5 % ophthalmic solution; Administer 1 drop to both eyes 4 (four) times a day as needed.      -IUP at 39w4d:   Prenatal labs reviewed   Bilateral renal pyelectasis: mild and stable in third trimester.  GBS status is negative  Peripartum Anesthesia: the patient does not desire an epidural during labor.   Post-partum Contraception: declines   Breast/Bottle: breastfeed   Reviewed plans for getting to the hospital. Has hospital form  RTC in 1 weeks or sooner with problems. NST at next visit    Visit completed along with assistance of [employer] .    Flor Stoddard MD    "

## 2021-05-31 NOTE — PROGRESS NOTES
"SUBJECTIVE:  Colten Meade is a 24 y.o. female  at 40w4d by LMP c/w 8 wk US. Estimated Date of Delivery: 19.  She is doing well. She denies vaginal bleeding, leakage of fluid, or urinary symptoms. Fetal movement is present. She is having contractions- every 5-10 minutes  Concerns: None  ROS  Negative except as noted above in HPI  OBJECTIVE:  Blood pressure 106/62, pulse 68, temperature 98  F (36.7  C), temperature source Oral, resp. rate 16, height 4' 10\" (1.473 m), weight 152 lb (68.9 kg), last menstrual period 2018, currently breastfeeding.  Gen: Comfortable, no acute distress.  Abd: Gravid, non-tender  See OB Vitals flowsheet.  NST: baseline 130, moderate variability, accels, no decels  [place]: contractions every 6-7 minutes    ASSESSMENT/PLAN:  -IUP at 40w4d:   Prenatal labs reviewed   Mild bilateral renal pyelectasis- stable  Hx of fetal demise at 4 months- normal cell free DNA  GBS status is negative  Peripartum Anesthesia: the patient does not desire an epidural during labor.   Post-partum Contraception: declines  Breast/Bottle: breastfeed   Reviewed plans for getting to the hospital.  Reviewed skin to skin.  Reviewed baby meds in hospital and 24 hour testing, delayed cord clamping.    Plan for BPP/EFW this weekend, then appointment next Monday with NST. She declines induction at 41 weeks. Discussed risks of post-dates pregnancy- will plan for twice weekly monitoring from 41-42 weeks.     Visit completed along with assistance of [facility] .    Flor Stoddard MD    "

## 2021-05-31 NOTE — PROGRESS NOTES
"SUBJECTIVE:  Colten Meade is a 24 y.o. female  at 38w4d. Estimated Date of Delivery: 19.  She is doing well. She denies vaginal bleeding, leakage of fluid, or urinary symptoms. Fetal movement is present. She is having irregular contractions.  Concerns: would like cervix checked. She asks about pain control options during delivery today- reviewed all options.   ROS  Negative except as noted above in HPI  OBJECTIVE:  Blood pressure 112/70, pulse 72, temperature 98.2  F (36.8  C), temperature source Oral, resp. rate 16, height 4' 10\" (1.473 m), weight 153 lb (69.4 kg), last menstrual period 2018, currently breastfeeding.  Gen: Comfortable, no acute distress.  Abd: Gravid, non-tender  See OB Vitals flowsheet.  Cervix: closed, posterior  ASSESSMENT/PLAN:  -IUP at 38w4d:   Prenatal labs reviewed and normal  Hx of fetal demise at 4 months- normal cell free DNA  Fetal survey was done. Mild bilateral pyelectasis persistent in third trimester- no further evaluation needed.  GBS status is negative   Peripartum Anesthesia: the patient does not desire an epidural during labor.   Post-partum Contraception: delinces   Breast/Bottle: breast   Reviewed plans for getting to the hospital. Has hospital form  RTC in 1 weeks or sooner with problems.    Visit completed along with assistance of [facility] .    Flor Stoddard MD    "

## 2021-06-01 VITALS — WEIGHT: 128.5 LBS | HEIGHT: 58 IN | BODY MASS INDEX: 26.97 KG/M2

## 2021-06-01 VITALS — WEIGHT: 124 LBS | BODY MASS INDEX: 26.03 KG/M2 | HEIGHT: 58 IN

## 2021-06-01 VITALS — BODY MASS INDEX: 26.45 KG/M2 | HEIGHT: 58 IN | WEIGHT: 126 LBS

## 2021-06-01 VITALS — BODY MASS INDEX: 27.29 KG/M2 | HEIGHT: 58 IN | WEIGHT: 130 LBS

## 2021-06-01 VITALS — HEIGHT: 58 IN | WEIGHT: 125 LBS | BODY MASS INDEX: 26.24 KG/M2

## 2021-06-01 VITALS — BODY MASS INDEX: 26.24 KG/M2 | HEIGHT: 58 IN | WEIGHT: 125 LBS

## 2021-06-01 VITALS — HEIGHT: 58 IN | WEIGHT: 124.5 LBS | BODY MASS INDEX: 26.13 KG/M2

## 2021-06-02 VITALS — BODY MASS INDEX: 28.34 KG/M2 | WEIGHT: 135 LBS | HEIGHT: 58 IN

## 2021-06-02 VITALS — BODY MASS INDEX: 25.4 KG/M2 | WEIGHT: 121 LBS | HEIGHT: 58 IN

## 2021-06-02 VITALS — BODY MASS INDEX: 27.29 KG/M2 | HEIGHT: 58 IN | WEIGHT: 130 LBS

## 2021-06-02 VITALS — WEIGHT: 123 LBS | HEIGHT: 58 IN | BODY MASS INDEX: 25.82 KG/M2

## 2021-06-02 VITALS — WEIGHT: 124.38 LBS | BODY MASS INDEX: 26.11 KG/M2 | HEIGHT: 58 IN

## 2021-06-02 VITALS — BODY MASS INDEX: 28.76 KG/M2 | WEIGHT: 137 LBS | HEIGHT: 58 IN

## 2021-06-02 VITALS — WEIGHT: 138 LBS | BODY MASS INDEX: 28.97 KG/M2 | HEIGHT: 58 IN

## 2021-06-02 VITALS — BODY MASS INDEX: 25.61 KG/M2 | HEIGHT: 58 IN | WEIGHT: 122 LBS

## 2021-06-02 VITALS — WEIGHT: 124.25 LBS | BODY MASS INDEX: 26.08 KG/M2 | HEIGHT: 58 IN

## 2021-06-02 NOTE — PROGRESS NOTES
"SUBJECTIVE  Colten Meade is a 24 y.o. female here for:    Chief Complaint   Patient presents with     Hospital Visit Follow Up     10/10/19 Pema's Hypokalemia     Seen in ED on 10/10/19 for hypokalemia, symptomatic with numbness in fingers. Potassium was 2.3, recheck in ED was 2.1. Normal renal function and other labs. EKG showed sinus bradycardia but otherwise normal without arryhythmia. She was repleted orally and completed course of potassium. She has been trying to eat foods rich in potassium- she was not eating well postpartum. She denies mood concerns. She has been trying to eat more frequently- and eat more vegetables. She was mostly eating noodles and rice postpartum. She is breastfeeding.    ROS  Complete 10 point review of systems negative except as noted above in HPI    Reviewed Past Medical History, Medications, Family History and Social History in Epic and up to date with no new changes.    OBJECTIVE  /78 (Patient Site: Right Arm, Patient Position: Sitting, Cuff Size: Adult Regular)   Pulse 72   Temp 97.8  F (36.6  C) (Oral)   Resp 16   Ht 4' 10\" (1.473 m)   Wt 117 lb (53.1 kg)   SpO2 98%   BMI 24.45 kg/m       General: Cooperative, pleasant, in no acute distress  CV: RRR, normal S1/S2, no murmur, rubs, gallops  Resp: No respiratory distress. Clear to auscultation bilaterally. No wheezes, rales, rhonchi    LABS & IMAGES   Results for orders placed or performed in visit on 10/25/19   Basic Metabolic Panel   Result Value Ref Range    Sodium 139 136 - 145 mmol/L    Potassium 4.2 3.5 - 5.0 mmol/L    Chloride 104 98 - 107 mmol/L    CO2 26 22 - 31 mmol/L    Anion Gap, Calculation 9 5 - 18 mmol/L    Glucose 84 70 - 125 mg/dL    Calcium 10.4 8.5 - 10.5 mg/dL    BUN 16 8 - 22 mg/dL    Creatinine 0.82 0.60 - 1.10 mg/dL    GFR MDRD Af Amer >60 >60 mL/min/1.73m2    GFR MDRD Non Af Amer >60 >60 mL/min/1.73m2         ASSESSMENT/PLAN:   Colten was seen today for hospital visit follow up.    Diagnoses and all " orders for this visit:    Hypokalemia: Potassium was 2.1 in ED previously, now s/p course of repletion with oral potassium. Potassium rechecked today and normal. Etiology unknown but possibly related to poor nutrition. No other GI losses or renal abnormalities. She was eating mostly rice and noodles postpartum and she is breastfeeding- she is now incorporating more vegetables and fruits. She is also eating bananas. No need for further oral potassium. Encouraged high potassium foods.  -     Basic Metabolic Panel      Follow-up in 1 year for annual physical    Visit was completed [facility] with [facility]     Options for treatment and follow-up care were reviewed with the patient. Colten Deshaun and/or guardian was engaged and actively involved in the decision making process. Colten Deshaun and/or guardian verbalized understanding of the options discussed and was satisfied with the final plan.      Flor Stoddard MD     Forehead Units: 23

## 2021-06-02 NOTE — PROGRESS NOTES
Assessment/Plan:     Colten was seen today for postpartum care.    Diagnoses and all orders for this visit:    Routine postpartum follow-up    Contraception management: Had difficulty getting pregnant last time- she declines formal birth control. She is exclusively breastfeeding. Discussed options for contraception- she will use condoms and natural family planning- reviewed effectiveness of these options with patient and encouraged to follow-up anytime if she has more questions.    Need for immunization against influenza  -     Influenza, Seasonal Quad, PF =/> 6months    Urinary frequency: Check UA/UC. No fever or suprapubic tenderness. No hematuria.  -     Urinalysis-UC if Indicated    Pregnancy, unspecified gestational age  -     prenat.vits,doug,min-iron-folic (PRENATAL VITAMIN) Tab; Take 1 tablet by mouth daily.    Headache: Intermittent, unilateral, throbbing. No vision concerns. No nausea/emesis. Discussed headache trigger modification. Encouraged hydration. Use tylenol or NSAIDS as needed. No red flag symptoms.  -     ibuprofen (ADVIL,MOTRIN) 600 MG tablet; Take 1 tablet (600 mg total) by mouth daily as needed for pain.  -     acetaminophen (TYLENOL EXTRA STRENGTH) 500 MG tablet; Take 1-2 tablets (500-1,000 mg total) by mouth daily as needed for pain (headache).    Numbness/tingling/Paresthesias: Tip of fingers and toes. 2-3 minutes and then resolves- intermittent since she had baby. No other symptoms. No muscle weakness or shortness of breath or chest pain. Will check labs.   -     Glycosylated Hemoglobin A1c  -     Vitamin B12  -     HM2(CBC w/o Differential)  -     Basic Metabolic Panel    Anemia:  Normal antepartum hemoglobin with no excessive blood loss  Breastfeeding/Bottle feeding:  Pt is breasfeeding, discussed how to support  Contraception:   Had difficulty getting pregnant last time, wants to try natural family planning and condoms, declines other contraception  Depression:   See Estrella Morales  survey  Exercise:   Encouraged increasing exercise based on how she is feeling.  Healthcare maintenance:   Up to date  Immunizations:    Immunizations needed; see orders           Subjective:      Colten Meade is a 24 y.o. female who presents for a postpartum visit.   She is 6 weeks postpartum following a   I have fully reviewed the prenatal and intrapartum course.   Postpartum course has been Unremarkable.  Baby's course has been Uncomplicated.  Periods:  None Patient's last menstrual period was 2018 (within days).   Bowel or bladder concerns: Urinary urgency, no dysuria, no hematuria. No fevers. No constipation or bowel concerns.  Patient has not resumed intercourse.    [place]  Depression Scale: 0- she does endorse some family stressors but reports she is coping well and declines to discuss further  She has headaches, not everyday, unilateral with throbbing. No photophobia, visual changes, phonophobia. No nausea or emesis. Denies thunderclap headache. Has not tried anything.    Last hgb on file:    Lab Results   Component Value Date    HGB 13.0 10/10/2019        The following portions of the patient's history were reviewed and updated as appropriate: allergies, current medications and problem list     OB History    Para Term  AB Living   3 3 2 1 0 2   SAB TAB Ectopic Multiple Live Births         0 2      # Outcome Date GA Lbr Dmitry/2nd Weight Sex Delivery Anes PTL Lv   3 Term 19 40w6d 06:20 / 02:21 8 lb 0.9 oz (3.655 kg) F Vag-Spont Local N TORITO   2 Term 10/10/12 40w0d  6 lb 6.3 oz (2.9 kg) F Vag-Spont None  TORITO      Birth Comments: in [place]   1   18w0d   M    FD      Complications: Other Excessive Bleeding      Obstetric Comments   Hx of fetal demise at 4 months per patient in [facility].     Information for the patient's :  Yolanda Maldonado [[id number]]   Yolanda Maldonado      Objective:      Vitals:    10/10/19 1032   BP: 124/80   Pulse: (!) 58   Resp: 16   Temp: 98   F (36.7  C)   SpO2: 97%       Physical Exam:  General Appearance: Alert, cooperative, no distress, appears stated age  Neck: Supple, no adenopathy;  thyroid: not enlarged, symmetric, no tenderness/mass/nodules  Lungs: Clear to auscultation bilaterally, respirations unlabored  Heart: Regular rate and rhythm, S1 and S2 normal, no murmur, rub, or gallop  Abdomen: Soft, non-tender, no masses, no organomegaly  Pelvic: Normally developed genitalia with no external lesions or eruptions. Well healing perineal laceration.  Skin: Skin color, texture, turgor normal, no rashes or lesions  Neurologic: Normal     Flor Stoddard MD

## 2021-06-02 NOTE — TELEPHONE ENCOUNTER
Who is calling:  Error    Reason for Call: Error    Date of last appointment with primary care: Error    Okay to leave a detailed message: Error

## 2021-06-03 VITALS — HEIGHT: 58 IN | BODY MASS INDEX: 31.7 KG/M2 | WEIGHT: 151 LBS

## 2021-06-03 VITALS — BODY MASS INDEX: 31.07 KG/M2 | WEIGHT: 148 LBS | HEIGHT: 58 IN

## 2021-06-03 VITALS — HEIGHT: 58 IN | WEIGHT: 150 LBS | BODY MASS INDEX: 31.49 KG/M2

## 2021-06-03 VITALS
DIASTOLIC BLOOD PRESSURE: 80 MMHG | BODY MASS INDEX: 24.98 KG/M2 | TEMPERATURE: 98 F | WEIGHT: 119 LBS | SYSTOLIC BLOOD PRESSURE: 124 MMHG | HEART RATE: 58 BPM | HEIGHT: 58 IN | OXYGEN SATURATION: 97 % | RESPIRATION RATE: 16 BRPM

## 2021-06-03 VITALS — WEIGHT: 146 LBS | HEIGHT: 58 IN | BODY MASS INDEX: 30.64 KG/M2

## 2021-06-03 VITALS
SYSTOLIC BLOOD PRESSURE: 120 MMHG | DIASTOLIC BLOOD PRESSURE: 78 MMHG | OXYGEN SATURATION: 98 % | WEIGHT: 117 LBS | HEIGHT: 58 IN | RESPIRATION RATE: 16 BRPM | HEART RATE: 72 BPM | BODY MASS INDEX: 24.56 KG/M2 | TEMPERATURE: 97.8 F

## 2021-06-03 VITALS — HEIGHT: 58 IN | BODY MASS INDEX: 31.91 KG/M2 | WEIGHT: 152 LBS

## 2021-06-03 VITALS — BODY MASS INDEX: 29.6 KG/M2 | WEIGHT: 141 LBS | HEIGHT: 58 IN

## 2021-06-03 VITALS — WEIGHT: 153 LBS | HEIGHT: 58 IN | BODY MASS INDEX: 32.12 KG/M2

## 2021-06-03 VITALS — WEIGHT: 145 LBS | HEIGHT: 58 IN | BODY MASS INDEX: 30.44 KG/M2

## 2021-06-13 NOTE — TELEPHONE ENCOUNTER
Received call from radiologist regarding ultrasound results. No fetal pole or cardiac activity and gestational sac measuring 9 weeks.     Called and discussed using language line with [employer] . Discussed anembryonic pregnancy- and options for treatment including expectant management vs surgical vs medication. Answered all questions.    Patient wants to do expectant management at this time. Discussed what to expect with bleeding, cramping and warning signs and when to go to ED (excessive bleeding, fever, severe pain). She expressed understanding.     I will set up appointment next Wednesday to further discuss/monitor.    Flor Stoddard

## 2021-06-14 NOTE — PROGRESS NOTES
"Colten Meade is a 25 y.o. female who is being evaluated via a billable telephone visit.      The patient has been notified of following:     \"This telephone visit will be conducted via a call between you and your physician/provider. We have found that certain health care needs can be provided without the need for a physical exam.  This service lets us provide the care you need with a short phone conversation.  If a prescription is necessary we can send it directly to your pharmacy.  If lab work is needed we can place an order for that and you can then stop by our lab to have the test done at a later time.    Telephone visits are billed at different rates depending on your insurance coverage. During this emergency period, for some insurers they may be billed the same as an in-person visit.  Please reach out to your insurance provider with any questions.    If during the course of the call the physician/provider feels a telephone visit is not appropriate, you will not be charged for this service.\"    Patient has given verbal consent to a Telephone visit? Yes    What phone number would you like to be contacted at? See chart    Patient would like to receive their AVS by: declines    Additional provider notes:     US 20 showed gestational sac measuring 9w2d. No fetal pole or cardiac activity suggesting non-viable pregnancy  Discussed results with patient last week and she had elected for expectant management  Denies any abdominal pain, cramping, vaginal bleeding, fever.  No urinary changes.  She has discussed with her  and they would like to repeat the US to \"make sure the baby is not growing inside the sac\" but then would like to consider surgical management with D&C      Assessment/Plan:    Colten was seen today for follow-up.    Diagnoses and all orders for this visit:    Non-viable pregnancy:  at 11w3d by LMP today. Non-viable pregnancy detected last week (5 days ago) by dating US which showed gestational " sac measuring 9w2d but no visible fetal pole or cardiac activity. Of note she does have two successful  and 1 history of 4 month demise with her first pregnancy. She had elected to do expectant management after discussion of expectant vs medical vs surgical management last week; however, she has not had any bleeding/cramping since her ultrasound, and after discussion with her , they agreed to consider surgical management with D&C. Discussed she will need appointment with OB-GYN next week to discuss- will send message to specialty  to help arrange this due to language barrier. Reviewed that she will need clinic visit first to discuss- then schedule D&C if this is what she decides. Answered all questions.   -     Ambulatory referral to Obstetrics / Gynecology      Phone call duration:  18 minutes    Flor Stoddard MD

## 2021-06-14 NOTE — ANESTHESIA PREPROCEDURE EVALUATION
Anesthesia Evaluation      Patient summary reviewed   No history of anesthetic complications     Airway   Mallampati: II  Neck ROM: full   Pulmonary - negative ROS and normal exam                          Cardiovascular - negative ROS and normal exam   Neuro/Psych - negative ROS     Endo/Other - negative ROS      GI/Hepatic/Renal - negative ROS      Other findings: Missed AB      Dental - normal exam                        Anesthesia Plan  Planned anesthetic: MAC  Versed/fentanyl/propofol  Ketamine PRN  Decadron/zofran  Toradol if OK with surgeon    ASA 1   Induction: intravenous   Anesthetic plan and risks discussed with: patient  Anesthesia plan special considerations: antiemetics,   Post-op plan: routine recovery    1/2/21 covid pcr negative    Results for orders placed or performed during the hospital encounter of 01/03/21   Basic Metabolic Panel   Result Value Ref Range    Sodium 138 136 - 145 mmol/L    Potassium 3.6 3.5 - 5.0 mmol/L    Chloride 107 98 - 107 mmol/L    CO2 24 22 - 31 mmol/L    Anion Gap, Calculation 7 5 - 18 mmol/L    Glucose 109 70 - 125 mg/dL    Calcium 8.9 8.5 - 10.5 mg/dL    BUN 9 8 - 22 mg/dL    Creatinine 0.59 (L) 0.60 - 1.10 mg/dL    GFR MDRD Af Amer >60 >60 mL/min/1.73m2    GFR MDRD Non Af Amer >60 >60 mL/min/1.73m2   Beta-hCG, Quantitative   Result Value Ref Range    Beta-hCG, Quantitative 1,814 (H) 0 - 4 mlU/mL   ABO/Rh   Result Value Ref Range    ABORh O POS    HM1 (CBC with Diff)   Result Value Ref Range    WBC 6.8 4.0 - 11.0 thou/uL    RBC 3.98 3.80 - 5.40 mill/uL    Hemoglobin 12.2 12.0 - 16.0 g/dL    Hematocrit 36.7 35.0 - 47.0 %    MCV 92 80 - 100 fL    MCH 30.7 27.0 - 34.0 pg    MCHC 33.2 32.0 - 36.0 g/dL    RDW 12.1 11.0 - 14.5 %    Platelets 206 140 - 440 thou/uL    MPV 10.2 8.5 - 12.5 fL    Neutrophils % 55 50 - 70 %    Lymphocytes % 31 20 - 40 %    Monocytes % 12 (H) 2 - 10 %    Eosinophils % 1 0 - 6 %    Basophils % 0 0 - 2 %    Immature Granulocyte % 0 <=0 %    Neutrophils  Absolute 3.7 2.0 - 7.7 thou/uL    Lymphocytes Absolute 2.1 0.8 - 4.4 thou/uL    Monocytes Absolute 0.8 0.0 - 0.9 thou/uL    Eosinophils Absolute 0.1 0.0 - 0.4 thou/uL    Basophils Absolute 0.0 0.0 - 0.2 thou/uL    Immature Granulocyte Absolute 0.0 <=0.0 thou/uL

## 2021-06-14 NOTE — ANESTHESIA CARE TRANSFER NOTE
Last vitals:   Vitals:    01/06/21 0736   BP: 135/65   Pulse: 93   Resp: 16   Temp: 36.7  C (98  F)   SpO2: 97%     Patient's level of consciousness is drowsy  Spontaneous respirations: yes  Maintains airway independently: yes  Dentition unchanged: yes  Oropharynx: oropharynx clear of all foreign objects    QCDR Measures:  ASA# 20 - Surgical Safety Checklist: WHO surgical safety checklist completed prior to induction    PQRS# 430 - Adult PONV Prevention: 4558F - Pt received => 2 anti-emetic agents (different classes) preop & intraop  ASA# 8 - Peds PONV Prevention: NA - Not pediatric patient, not GA or 2 or more risk factors NOT present  PQRS# 424 - Nisa-op Temp Management: 4559F - At least one body temp DOCUMENTED => 35.5C or 95.9F within required timeframe  PQRS# 426 - PACU Transfer Protocol: - Transfer of care checklist used  ASA# 14 - Acute Post-op Pain: ASA14B - Patient did NOT experience pain >= 7 out of 10

## 2021-06-14 NOTE — ANESTHESIA POSTPROCEDURE EVALUATION
Patient: [facility]  Procedure(s):  SUCTION DILATION AND CURETTAGE  Anesthesia type: MAC    Patient location: Phase II Recovery  Last vitals:   Vitals Value Taken Time   /60 01/06/21 0900   Temp 36.4  C (97.6  F) 01/06/21 0830   Pulse 80 01/06/21 0915   Resp 16 01/06/21 0900   SpO2 100 % 01/06/21 0915   Vitals shown include unvalidated device data.  Post vital signs: stable  Level of consciousness: awake and responds to simple questions  Post-anesthesia pain: pain controlled  Post-anesthesia nausea and vomiting: no  Pulmonary: unassisted, return to baseline  Cardiovascular: stable and blood pressure at baseline  Hydration: adequate  Anesthetic events: no    QCDR Measures:  ASA# 11 - Nisa-op Cardiac Arrest: ASA11B - Patient did NOT experience unanticipated cardiac arrest  ASA# 12 - Nisa-op Mortality Rate: ASA12B - Patient did NOT die  ASA# 13 - PACU Re-Intubation Rate: NA - No ETT / LMA used for case  ASA# 10 - Composite Anes Safety: ASA10A - No serious adverse event    Additional Notes:

## 2021-06-15 ENCOUNTER — AMBULATORY - HEALTHEAST (OUTPATIENT)
Dept: SURGERY | Facility: AMBULATORY SURGERY CENTER | Age: 26
End: 2021-06-15

## 2021-06-15 DIAGNOSIS — Z11.59 ENCOUNTER FOR SCREENING FOR OTHER VIRAL DISEASES: ICD-10-CM

## 2021-06-16 ENCOUNTER — AMBULATORY - HEALTHEAST (OUTPATIENT)
Dept: LAB | Facility: CLINIC | Age: 26
End: 2021-06-16

## 2021-06-16 ENCOUNTER — RECORDS - HEALTHEAST (OUTPATIENT)
Dept: ADMINISTRATIVE | Facility: OTHER | Age: 26
End: 2021-06-16

## 2021-06-16 DIAGNOSIS — Z11.59 ENCOUNTER FOR SCREENING FOR OTHER VIRAL DISEASES: ICD-10-CM

## 2021-06-16 PROBLEM — Z86.39 HISTORY OF HYPOKALEMIA: Status: ACTIVE | Noted: 2019-10-25

## 2021-06-16 PROBLEM — Z86.19 HISTORY OF CHLAMYDIA: Status: ACTIVE | Noted: 2019-01-04

## 2021-06-16 ASSESSMENT — MIFFLIN-ST. JEOR: SCORE: 1281.54

## 2021-06-16 NOTE — PROGRESS NOTES
Colten was seen today for establish care and annual exam.    Diagnoses and all orders for this visit:    Routine general medical examination at a health care facility    Need for immunization against influenza  -     Influenza, Seasonal Quad, Preservative Free 36+ Months    Pap smear for cervical cancer screening: No history of abnormal paps. Normal appearing cervix. Pap today.  -     Gynecologic Cytology (PAP Smear)    Screen for STD (sexually transmitted disease): Asymptomatic.  -     Chlamydia trachomatis & Neisseria gonorrhoeae, Amplified Detection    Family history of diabetes mellitus: Father has diabetes type 2.   -     Glycosylated Hemoglobin A1c    Irregular periods: Has 5 year old daughter. However, has been having more irregular menses. No associated symptoms. Will obtain some baseline labs to workup irregular periods. Discussed timing methods for intercourse. She desires pregnancy. Also started on prenatal vitamins and encouraged regular exercise and healthy eating.  -     HM2(CBC w/o Differential)  -     Thyroid Cascade  -     Glycosylated Hemoglobin A1c  -     prenat.vits,doug,min-iron-folic (PRENATAL VITAMIN) Tab; Take 1 tablet by mouth daily.      Subjective:      Colten Meade is a 23 y.o. female who presents for an annual exam. The patient is sexually active. The patient participates in regular exercise: yes. The patient reports that there is not domestic violence in her life.     Has 5 year old,  in [place]  Has been trying to get pregnant for 2 year. Recently has had more irregular periods. Every 28-30 days but then skips months. She used to have regular periods. Denies any weight changes, headaches, galactorrhea, abdominal pain, vaginal discharge, history of STI.    Healthy Habits:   Regular Exercise: Yes  Sunscreen Use: Yes  Healthy Diet: Yes  Dental Visits Regularly: No  Seat Belt: Yes  Sexually active: Yes  Self Breast Exam Monthly:No  Hemoccults: N/A  Flex Sig: N/A  Colonoscopy: N/A  Lipid  Profile: No  Glucose Screen: No  Prevention of Osteoporosis: no  Last Dexa: no  Guns at Home:  no      Immunization History   Administered Date(s) Administered     HPV Quadrivalent 2014, 2014, 2015     Hep A, historic 2014, 2015     Hep B, historic 2014, 2014, 2014     IPV 2014, 2014, 2015     Influenza, inj, historic,unspecified 2016     MMR 2014, 2014     Meningococcal MCV4 Conjugate,Unspecified 2014     Td,adult,historic,unspecified 2014     Tdap 2014, 2015     Varicella 2014, 2014     Immunization status: up to date and documented.    No exam data present    Gynecologic History  Patient's last menstrual period was 2018.  Contraception: none, desires pregnancy  Last Pap: unsure, no abnormal paps  Last mammogram: N/A      OB History:   in [facility], no complications,     Past Medical History  No medical problems    Past Surgical History  No surgeries    Family History   Problem Relation Age of Onset     No Medical Problems Mother      Diabetes Father      Hypertension Father      Social History:  , 1 child  Moved from [place] recently, previously in [facility].  Denies tobacco, EtOH, drug use.    Review of patient's allergies indicates no known allergies.  Family History   Problem Relation Age of Onset     No Medical Problems Mother      Diabetes Father      Hypertension Father        Review of Systems  General:  Denies problem  Eyes: Denies problem  Ears/Nose/Throat: Denies problem  Cardiovascular: Denies problem  Respiratory:  Denies problem  Gastrointestinal:  Denies problem, Genitourinary: Denies problem  Musculoskeletal:  Denies problem  Skin: Denies problem  Neurologic: Denies problem  Psychiatric: Denies problem  Endocrine: Denies problem  Heme/Lymphatic: Denies problem   Allergic/Immunologic: Denies problem        Objective:         Vitals:    18 0826   BP: 128/82  "  Pulse: 64   Resp: 16   Temp: 98.3  F (36.8  C)   TempSrc: Oral   Weight: 127 lb (57.6 kg)   Height: 4' 10\" (1.473 m)     Body mass index is 26.54 kg/(m^2).    Physical Exam:  General Appearance: Alert, cooperative, no distress, appears stated age  Head: Normocephalic, without obvious abnormality, atraumatic  Eyes: PERRL, conjunctiva/corneas clear, EOM's intact  Ears: Normal TM's and external ear canals, both ears  Nose: Nares normal, septum midline,mucosa normal, no drainage  Throat: Lips, mucosa, and tongue normal; teeth and gums normal  Neck: Supple, symmetrical, trachea midline, no adenopathy;  thyroid: not enlarged, symmetric, no tenderness/mass/nodules  Back: Symmetric, no curvature, ROM normal, no CVA tenderness  Lungs: Clear to auscultation bilaterally, respirations unlabored  Heart: Regular rate and rhythm, S1 and S2 normal, no murmur, rub, or gallop, Abdomen: Soft, non-tender, bowel sounds active all four quadrants,  no masses, no organomegaly  Pelvic:Normally developed genitalia with no external lesions or eruptions. Vagina and cervix show no lesions, inflammation, discharge or tenderness. No cystocele, No rectocele. Uterus non-tender.  No adnexal mass or tenderness.  Extremities: Extremities normal, atraumatic, no cyanosis or edema  Skin: Skin color, texture, turgor normal, no rashes or lesions  Lymph nodes: Cervical nodes normal.  Neurologic: Normal      Flor Stoddard MD  "

## 2021-06-16 NOTE — PROGRESS NOTES
"SUBJECTIVE  Colten Deshaun is a 23 y.o. female here for follow-up    Chlamydia positive at last visit: took azithromycin 1 g last week. Has been having some chronic pelvic pain for 1 year but denies any acute pain. Denies fever/chills or pain with intercourse. Normal bowel movements. Her partner was treated last week as well. Have not been having any intercourse since then. Having some whitish discharge. She apparently had chlamydia that was treated in [place] in 1 year ago. She desires pregnancy and has been trying for 2 years without success.     ROS  Complete 10 point review of systems negative except as noted above in HPI    Reviewed Past Medical History, Medications, Family History and Social History in Epic and up to date with no new changes.    OBJECTIVE  /84 (Patient Site: Right Arm, Patient Position: Sitting, Cuff Size: Adult Regular)  Pulse 72  Temp 98.5  F (36.9  C) (Oral)   Resp 16  Ht 4' 10.25\" (1.48 m)  Wt 130 lb (59 kg)  LMP 02/25/2018  BMI 26.94 kg/m2     General: Cooperative, pleasant, in no acute distress  Abd: Soft, non-tender, non-distended   (female): External genitalia is without lesions. Introitus is normal, vaginal walls pink and moist without lesions or evidence of trauma. No cervical motion tenderness. No adnexal masses.     LABS & IMAGES   Results for orders placed or performed in visit on 03/19/18   Wet Prep, Vaginal   Result Value Ref Range    Yeast Result No yeast seen No yeast seen    Trichomonas No Trichomonas seen No Trichomonas seen    Clue Cells, Wet Prep No Clue cells seen No Clue cells seen         ASSESSMENT/PLAN:   Colten was seen today for follow-up.    Diagnoses and all orders for this visit:    Vaginal discharge/hx of chlamydia: Could be related to recent chlamydia infection that was treated; however obtained wet prep today to rule out yeast, BV, trich infection. Wet prep was negative. Pelvic exam with no discharge noted, no cervical motion tenderness or adnexal " tenderness. No signs of PID. Afebrile, no cervical motion tenderness. Partner was treated. 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 Discussed that we will have her return in 1 month for test of cure. If she is still having infertility after 3 months, consider referral to OB for further evaluation of tubal integrity (hysterosalpingogram).  -     Wet Prep, Vaginal    RTC in 1 month    [facility]     Options for treatment and follow-up care were reviewed with the patient. Colten Deshaun and/or guardian was engaged and actively involved in the decision making process. Colten Deshaun and/or guardian verbalized understanding of the options discussed and was satisfied with the final plan.      Flor Stoddard MD

## 2021-06-17 ENCOUNTER — COMMUNICATION - HEALTHEAST (OUTPATIENT)
Dept: SCHEDULING | Facility: CLINIC | Age: 26
End: 2021-06-17

## 2021-06-17 ENCOUNTER — ANESTHESIA - HEALTHEAST (OUTPATIENT)
Dept: SURGERY | Facility: AMBULATORY SURGERY CENTER | Age: 26
End: 2021-06-17

## 2021-06-17 NOTE — TELEPHONE ENCOUNTER
Please start with pregnancy confirmation appointment with any available provider who does this type of appt.     Dorothy Tee MD

## 2021-06-17 NOTE — TELEPHONE ENCOUNTER
New Appointment Needed  What is the reason for the visit:    Pregnancy Confirmation Appt Needed  When was the first day of your last menstrual cycle?: 4/5  Have you done a home pregnancy test?: Yes: 5/17/2021.    Provider Preference: Any available  How soon do you need to be seen?: next available  Waitlist offered?: No  Okay to leave a detailed message:  Yes

## 2021-06-17 NOTE — PROGRESS NOTES
"SUBJECTIVE  Colten Deshaun is a 23 y.o. female here for follow-up    Diagnosed chlamydia on 3/8/18. Treated. Her partner was also treated and they abstained from sex for 1 week following treatment. No other partners. Here for test of cure. She reports her symptoms are improved.    RUQ pain: 2 weeks ago, crampy pain, radiates to back. Intermittent pain. No association with food or movement. No epigastric burning. Has never had this before. Has not tried anything for it. +Constipation. No fever/chills, nausea/vomiting. Denies melena/hematochezia. She also reports some urinary urgency but denies dysuria, hematuria.      Infertility: has 5 year old daughter. She has been trying to get pregnant for the last 2-3 years. Has not been using timing methods. She gets irregular periods. Had normal TSH, A1C, hemoglobin at last visit. Denies associated symptoms. She has history of chlamydia that was treated years ago.     ROS  Complete 10 point review of systems negative except as noted above in HPI    Reviewed Past Medical History, Medications, Family History and Social History in Epic and up to date with no new changes.    OBJECTIVE  /80 (Patient Site: Left Arm, Patient Position: Sitting, Cuff Size: Adult Regular)  Pulse 68  Temp 98  F (36.7  C) (Oral)   Resp 16  Ht 4' 10.25\" (1.48 m)  Wt 128 lb 8 oz (58.3 kg)  BMI 26.63 kg/m2     General: Cooperative, pleasant, in no acute distress  HEENT: NCAT, sclera clear, MMM  Neck: no lymphadenopathy, no masses  CV: RRR, normal S1/S2, no murmur, rubs, gallops  Resp: No respiratory distress. Clear to auscultation bilaterally. No wheezes, rales, rhonchi  Abd: Soft, tender in suprapubic region, no rebound or guarding, BS normal    LABS & IMAGES   pending    ASSESSMENT/PLAN:   Colten was seen today for follow-up.    Diagnoses and all orders for this visit:    History of chlamydia: Needs test of cure today.   -     Chlamydia trachomatis & Neisseria gonorrhoeae, Amplified Detection    RUQ " pain: 2 weeks, intermittent. Afebrile and vitally stable. Non-tender on exam. Not associated with meals to suggest biliary etiology. No symptoms of GERD. On exam, her symptoms appear more lower abdomen. Will obtain baseline lab work to rule out infectious etiology. Discussed warning signs. If no improvement, would consider further workup with ultrasound.    -     HM2(CBC w/o Differential)  -     Comprehensive Metabolic Panel    Constipation: Slow transit constipation. No red flag symptoms.  -     senna (SENOKOT) 8.6 mg tablet; Take 1 tablet by mouth daily as needed for constipation.    Infertility, female: Had normal TSH, A1C, hemoglobin. Check FSH and prolactin to complete workup. Has been trying 2-3 years however discussed timing intercourse methods today. She has remote history of chlamydia and was treated again last month. Could have PID and will consider referring to OB for hysterosalpingogram in 3 months if still not pregnant.  -     Follicle Stimulating Hormone (FSH)  -     Prolactin    Urinary urgency/suprapubic tenderness: No hematuria, dysuria, nausea/vomiting or flank pain. Will obtain UA to rule out UTI. Also with suprapubic tenderness on exam.  -     Urinalysis-UC if Indicated  -     HM2(CBC w/o Differential)  -     Comprehensive Metabolic Panel    RTC in 3 months    Visit was completed along with [employer]     Options for treatment and follow-up care were reviewed with the patient. Colten Deshaun and/or guardian was engaged and actively involved in the decision making process. Colten Deshaun and/or guardian verbalized understanding of the options discussed and was satisfied with the final plan.      Flor Stoddard MD

## 2021-06-17 NOTE — PROGRESS NOTES
"Chief Complaint   Patient presents with     pregnancy confiirmation       HPI:  Colten Meade is a 26 y.o. female  with  by phone  presents requesting pregnancy test.  Patient's last menstrual period was 2021 (exact date).   Normal: yes  Contraception: no  Home Pregnancy Test:  2021--positive     Last Pregnancy: November miscarriage  Symptoms: no    Tobacco use: no  ETOH use:  no    Planned: yes    MEDICATIONS:  Current Outpatient Medications on File Prior to Visit   Medication Sig     acetaminophen (TYLENOL EXTRA STRENGTH) 500 MG tablet Take 1-2 tablets (500-1,000 mg total) by mouth daily as needed for pain (headache).     ibuprofen (ADVIL,MOTRIN) 600 MG tablet Take 1 tablet (600 mg total) by mouth every 6 (six) hours as needed for pain.     [DISCONTINUED] prenatal vit no.130-iron-folic (PRENATAL VITAMIN) 27 mg iron- 800 mcg Tab tablet Take 1 tablet by mouth daily.     No current facility-administered medications on file prior to visit.          ALLERGIES:  Allergies   Allergen Reactions     Depo-Provera [Medroxyprogesterone] Dizziness       Dizziness and Neck Pain       SOCIAL HISTORY:  Social History     Tobacco Use   Smoking Status Never Smoker   Smokeless Tobacco Never Used         EXAM:  Vitals:    21 1705   BP: 100/70   Pulse: 77   Resp: 12   Temp: 98.2  F (36.8  C)   TempSrc: Oral   SpO2: 98%   Weight: 152 lb 9.6 oz (69.2 kg)   Height: 4' 9.99\" (1.473 m)       GEN:  NAD  ABD:  FUNDUS: not palpable    FHT's by doptone: none heard    LABS:  Results for orders placed or performed in visit on 21   Pregnancy, Urine   Result Value Ref Range    Pregnancy Test, Urine Positive (!) Negative       ASSESSMENT/PLAN:    1. Encounter for confirmation of pregnancy test result with physical examination  Pregnancy, Urine   2. Pregnancy, unspecified gestational age  prenatal vit no.130-iron-folic (PRENATAL VITAMIN) 27 mg iron- 800 mcg Tab tablet      Dating by LMP:  EGA: 5+2 weeks    EDC: " 1/17/2022    Start prenatal vitamins.  Healthy diet  Small, frequent meals if nauseated.  No medications other than tylenol unless okayed by doctor  Notify clinic if bleeding or pain.  Set up 1st OB visit.     The following portions of the patient's history were reviewed and updated as appropriate: allergies, current medications, past family history, past medical history, past social history, past surgical history and problem list.         Magnolia Burton MD   5/19/2021

## 2021-06-18 ENCOUNTER — SURGERY - HEALTHEAST (OUTPATIENT)
Dept: SURGERY | Facility: AMBULATORY SURGERY CENTER | Age: 26
End: 2021-06-18
Payer: COMMERCIAL

## 2021-06-18 ENCOUNTER — RECORDS - HEALTHEAST (OUTPATIENT)
Dept: ADMINISTRATIVE | Facility: OTHER | Age: 26
End: 2021-06-18

## 2021-06-18 ASSESSMENT — MIFFLIN-ST. JEOR: SCORE: 1281.54

## 2021-06-19 NOTE — PROGRESS NOTES
"SUBJECTIVE  Colten Deshaun is a 23 y.o. female here for:    RLQ: Intermittent. Chronic- 2 years duration. RLQ. Had normal UA, CMP, pelvic ultrasound. Achy pain. Normal appetite. LMP 7/13/18. No association with food or movement/twisting/bending. No associated symptoms. Had chlamydia that was treated. She denies any vaginal discharge or odor. She denies bowel or bladder symptoms. She would like to be pregnant and reportedly has been trying for 2-3 years. She recently just started timing her intercourse. Denies fever, unintentional weight loss or other systemic symptoms.    ROS  Complete 10 point review of systems negative except as noted above in HPI    Reviewed Past Medical History, Medications, Family History and Social History in Epic and up to date with no new changes.    OBJECTIVE  /70 (Patient Site: Left Arm, Patient Position: Sitting, Cuff Size: Adult Regular)  Pulse 61  Temp 98.4  F (36.9  C) (Oral)   Resp 12  Ht 4' 10.25\" (1.48 m)  Wt 125 lb (56.7 kg)  LMP 07/13/2018  SpO2 98%  Breastfeeding? Yes  BMI 25.9 kg/m2     General: Cooperative, pleasant, in no acute distress  HEENT: NCAT, PERRLA, MMM  Neck: no lymphadenopathy, no masses  CV: RRR, normal S1/S2, no murmur, rubs, gallops  Resp: No respiratory distress. Clear to auscultation bilaterally. No wheezes, rales, rhonchi  Abd: Tender suprapubic and RLQ, no rebound or guarding, BS normal, no masses  Psych: Normal mood and affect    LABS & IMAGES   Results for orders placed or performed in visit on 07/19/18   Urinalysis-UC if Indicated   Result Value Ref Range    Color, UA Yellow Colorless, Yellow, Straw, Light Yellow    Clarity, UA Clear Clear    Glucose, UA Negative Negative    Bilirubin, UA Negative Negative    Ketones, UA Negative Negative    Specific Gravity, UA 1.020 1.005 - 1.030    Blood, UA Negative Negative    pH, UA 6.0 5.0 - 8.0    Protein, UA Negative Negative mg/dL    Urobilinogen, UA 0.2 E.U./dL 0.2 E.U./dL, 1.0 E.U./dL    Nitrite, UA " Negative Negative    Leukocytes, UA Negative Negative       ASSESSMENT/PLAN:   Colten was seen today for abdominal pain.    Diagnoses and all orders for this visit:    RLQ abdominal pain: Chronic, 2 year duration. Hx of chlamydia that was treated but no cervical motion tenderness on exam previously. Pap is up to date. Pelvic ultrasound was normal. No other bowel or bladder symptoms or systemic symptoms to suggest another etiology. On exam, she has significant RLQ tenderness but no peritoneal signs. Will obtain further imaging as workup.   -     CT Abdomen Pelvis With Oral With IV Contrast; Future    Infertility, female: Has been trying for 2-3 years. Discussed timing methods previously. Will continue workup for chronic RLQ pain but she has had negative lab workup and pelvic ultrasound for the initial workup of infertility previously. Will consider referring in the future for infertility.   -     prenat.vits,doug,min-iron-folic (PRENATAL VITAMIN) Tab; Take 1 tablet by mouth daily.        Visit was completed along with [employer]  (used phone-line since no in-person  available)    Options for treatment and follow-up care were reviewed with the patient. Colten Deshaun and/or guardian was engaged and actively involved in the decision making process. Colten Deshaun and/or guardian verbalized understanding of the options discussed and was satisfied with the final plan.      Flor Stoddard MD

## 2021-06-19 NOTE — PROGRESS NOTES
"BROCK Meade is a 23 y.o. female here for follow-up    Infertility: Has been trying to get pregnant for the last 2-3 years but had not been using timing methods. Seen in clinic 3/19 and 4/19 and had workup with some lab testing that was all normal. She did have chlamydia on 3/8/18 that was treated and had test of cure on 4/19/18. Has 5 year old child with her . LMP 7/13/18. She gets regular periods every month. Denies bleeding between cycles.    Right sided pain: Intermittent, mild. Worse with lying down. No pain with twisting or bending. No radiation of pain. Duration of 1 month. No association with foods. Denies fever, nausea/vomiting. Feels like gas pains. Feels bloated. Having normal bowel movements and using senna as needed for constipation. No blood in stool. No urinary symtoms. No vaginal symptoms. She was recently treated for chlamydia and had test of cure and her partner was treated as well. Of note, she had CMP, urinalysis at last visit that was normal, except some blood in urine but she was on menses at the time.     ROS  Complete 10 point review of systems negative except as noted above in HPI    Reviewed Past Medical History, Medications, Family History and Social History in Epic and up to date with no new changes.    OBJECTIVE  /80 (Patient Site: Right Arm, Patient Position: Sitting, Cuff Size: Adult Regular)  Pulse 96  Temp 98.2  F (36.8  C) (Oral)   Resp 16  Ht 4' 10.25\" (1.48 m)  Wt 125 lb (56.7 kg)  LMP 07/13/2018  SpO2 96%  BMI 25.9 kg/m2     General: Cooperative, pleasant, in no acute distress  HEENT: NCAT, PERRLA, MMM  Neck: no lymphadenopathy, no masses  CV: RRR, normal S1/S2, no murmur, rubs, gallops  Resp: No respiratory distress. Clear to auscultation bilaterally. No wheezes, rales, rhonchi  Abd: Soft, no masses, BS normal, tenderness in LLQ, suprapubic region, no rebound or guarding. No periumbilical tenderness.  Psych: Normal mood and affect    LABS & IMAGES "   Results for orders placed or performed in visit on 07/19/18   Urinalysis-UC if Indicated   Result Value Ref Range    Color, UA Yellow Colorless, Yellow, Straw, Light Yellow    Clarity, UA Clear Clear    Glucose, UA Negative Negative    Bilirubin, UA Negative Negative    Ketones, UA Negative Negative    Specific Gravity, UA 1.020 1.005 - 1.030    Blood, UA Negative Negative    pH, UA 6.0 5.0 - 8.0    Protein, UA Negative Negative mg/dL    Urobilinogen, UA 0.2 E.U./dL 0.2 E.U./dL, 1.0 E.U./dL    Nitrite, UA Negative Negative    Leukocytes, UA Negative Negative       ASSESSMENT/PLAN:   Colten was seen today for infertility and abdominal pain:    Diagnoses and all orders for this visit:    Right sided pelvic pain/infertility: 1 month duration with no associated symptoms. Afebrile and vitally stable. Non-toxic appearing. Tolerating PO. She did have positive chlamydia previously but was treated and had test of cure and her partner was treated as well- she does not have any further symptoms to warrant re-testing today. Urinalysis previously showed some blood but she was on menses; repeat UA was clean today with no signs of blood or infection making pyelonephritis or nephrolithiasis unlikely etiology of her pain. On exam, mild suprapubic and RLQ tenderness but no other signs of appendicitis. Considering her infertility and pelvic pain, will obtain pelvic US for further workup. Her previous lab workup was negative, including CMP. Encouraged continued timing intercourse. Will obtain pelvic ultrasound and then follow-up in clinic for discussion and further steps. Patient has no constipation but patient feels her symptoms could be related to gas pains so we will try simethicone in the next week to see if that helps relieve her symptoms. Discussed warning signs including fever, worsening pain, nausea/vomiting.  -     US Pelvis Non OB; Future        -     simethicone (GAS-X) 80 MG chewable tablet; Chew 1 tablet (80 mg total) 4  (four) times a day as needed for flatulence.    History of hematuria: Urinalysis clear today. No further workup needed.  -     Urinalysis-UC if Indicated    RTC in 2 weeks     [facility]     Options for treatment and follow-up care were reviewed with the patient. Colten Deshaun and/or guardian was engaged and actively involved in the decision making process. Colten Deshaun and/or guardian verbalized understanding of the options discussed and was satisfied with the final plan.      Flor Stoddard MD

## 2021-06-20 NOTE — PROGRESS NOTES
"SUBJECTIVE  Colten Deshaun is a 23 y.o. female here for:    Chronic pelvic pain: LLQ, sometimes in RUQ. Achy pain. Over 2 years duration, intermittent. No clear inciting factors. No association with food. No epigastric burning. LMP 8/11/18. She has been trying to get pregnancy for 2-3 years, including timing intercourse for the last few months. She was found to have chlamydia in March but was treated and had test of cure. Her partner was treated as well. She has had some increased pelvic pain the last few days. Whitish discharge. Odor. No fever. No bowel or urinary changes. Denies dyspareunia. She has had negative workup including urinalysis, labs, pelvic US and abd/pelvic CT. She is here today for follow-up.     ROS  Complete 10 point review of systems negative except as noted above in HPI    Reviewed Past Medical History, Medications, Family History and Social History in Epic and up to date with no new changes.    OBJECTIVE  /80  Pulse 65  Temp 98  F (36.7  C) (Oral)   Resp 16  Ht 4' 10.25\" (1.48 m)  Wt 126 lb (57.2 kg)  LMP 08/11/2018 (Exact Date)  SpO2 99%  BMI 26.11 kg/m2     General: Cooperative, pleasant, in no acute distress  Abd: Soft, mildly tender in LLQ, no rebound or guarding, BS+   (female): External genitalia is without lesions. Introitus is normal, vaginal walls pink and moist without lesions or evidence of trauma. No cervical motion tenderness. No adnexal masses. No cervical lesions or discharge  Psych: Normal mood and affect    LABS & IMAGES   Wet prep and GC/chlam pending    ASSESSMENT/PLAN:   Coletn was seen today for follow-up.    Diagnoses and all orders for this visit:    Chronic pelvic pain in female/infertility: Chronic pelvic pain for 2 years with associated infertility the last 2 years. History of 1 previous child- same partner. Initial laboratory workup of infertility was negative. Pelvic ultrasound and abd/pelvic CT unremarkable. She did have positive chlamydia 3/8/18 but had test " of cure that was negative 4/19/18. Considering her lab workup was unremarkable for etiology of her infertility, she has not had pregnancy with timing intercourse, and her associated chronic pelvic pain, I discussed referral to OB/GYN for further evaluation of her infertility.  -     Ambulatory referral to Obstetrics / Gynecology    Vaginal discharge: Last 2 days. Obtain CGA and wet prep. No dyspareunia.   -     Chlamydia trachomatis & Neisseria gonorrhoeae, Amplified Detection  -     Wet Prep, Vaginal         [facility] with [facility]     Options for treatment and follow-up care were reviewed with the patient. Colten Deshaun and/or guardian was engaged and actively involved in the decision making process. Colten Deshaun and/or guardian verbalized understanding of the options discussed and was satisfied with the final plan.      Flor Stoddard MD

## 2021-06-20 NOTE — ANESTHESIA CARE TRANSFER NOTE
Last vitals:   Vitals:    09/25/18 0830   BP: 99/55   Pulse: 96   Resp: 14   Temp: 37.1  C (98.7  F)   SpO2: 100%     Patient's level of consciousness is drowsy  Spontaneous respirations: yes  Maintains airway independently: yes  Dentition unchanged: yes  Oropharynx: oropharynx clear of all foreign objects    QCDR Measures:  ASA# 20 - Surgical Safety Checklist: WHO surgical safety checklist completed prior to induction  PQRS# 430 - Adult PONV Prevention: 4558F - Pt received => 2 anti-emetic agents (different classes) preop & intraop  ASA# 8 - Peds PONV Prevention: NA - Not pediatric patient, not GA or 2 or more risk factors NOT present  PQRS# 424 - Nisa-op Temp Management: 4559F - At least one body temp DOCUMENTED => 35.5C or 95.9F within required timeframe  PQRS# 426 - PACU Transfer Protocol: - Transfer of care checklist used  ASA# 14 - Acute Post-op Pain: ASA14B - Patient did NOT experience pain >= 7 out of 10

## 2021-06-20 NOTE — PROGRESS NOTES
Preoperative Exam    Scheduled Procedure: Laparoscopy, Hysteroscopy  Surgery Date:  09/25/2018    Surgery Location: [facility], fax [phone]    Surgeon:  Boby Wren M.D.    Assessment/Plan:     Colten was seen today for pre-op exam.    Diagnoses and all orders for this visit:    Preoperative examination  -     Pregnancy, Urine- negative  -     HM2(CBC w/o Differential)- pending     Other orders  -     Influenza, Seasonal,Quad Inj, 36+ MOS      Surgical Procedure Risk: Intermediate (reported cardiac risk generally 1-5%)  Have you had prior anesthesia?: No  Have you or any family members had a previous anesthesia reaction:  No  Do you or any family members have a history of a clotting or bleeding disorder?: No  Cardiac Risk Assessment: no increased risk for major cardiac complications, no EKG indicated.    Patient approved for surgery with general or local anesthesia.      Functional Status: Independent  Patient plans to recover at home with family.     Subjective:      Colten Meade is a 23 y.o. female who presents for a preoperative consultation.  History of chronic LLQ pain with otherwise normal workup and imaging. She did have +chlmaydia in March that was treated and she had test of cure. She is having significant LLQ pain and dyspareunia and was referred to OB/GYN- planning for further workup with laparoscopy/hysteroscopy.    All other systems reviewed and are negative, other than those listed in the HPI.    Pertinent History  Do you have difficulty breathing or chest pain after walking up a flight of stairs: No  History of obstructive sleep apnea: No  Steroid use in the last 6 months: No  Frequent Aspirin/NSAID use: No  Prior Blood Transfusion: No  Prior Blood Transfusion Reaction: No  If for some reason prior to, during or after the procedure, if it is medically indicated, would you be willing to have a blood transfusion?:  There is no transfusion refusal.    Current Outpatient Prescriptions  "  Medication Sig Dispense Refill     prenat.vits,doug,min-iron-folic (PRENATAL VITAMIN) Tab Take 1 tablet by mouth daily. 100 each 3     No current facility-administered medications for this visit.         Allergies   Allergen Reactions     Depo-Provera [Medroxyprogesterone] Dizziness     Prefers BC pills for contraception       Patient Active Problem List   Diagnosis     Chlamydia       Past Medical History:  Chlamydia- treated  Chronic pelvic pain    Past Surgical History:   Procedure Laterality Date     nill         Social History     Social History     Marital status:      Spouse name: N/A     Number of children: N/A     Years of education: N/A     Occupational History     Not on file.     Social History Main Topics     Smoking status: Never Smoker     Smokeless tobacco: Never Used     Alcohol use No     Drug use: No     Sexual activity: Yes     Partners: Male     Birth control/ protection: None     Other Topics Concern     Not on file     Social History Narrative       Patient Care Team:  Flor Stoddard MD as PCP - General (Family Medicine)          Objective:     Vitals:    09/20/18 1034   BP: 108/68   Pulse: 79   Temp: 97.8  F (36.6  C)   TempSrc: Oral   SpO2: 98%   Weight: 124 lb 8 oz (56.5 kg)   Height: 4' 10\" (1.473 m)   LMP: 09/09/2018     Physical Exam:  General: Alert and oriented, in NAD.  Eyes: PERRL, EOMI, sclera normal.  HENT: Normocephalic, no pharyngeal erythema, MMM.  Neck: Supple, no adenopathy.  Heart: Normal S1 and S2, regular rhythm. No murmurs, gallops, rubs.  Lungs: CTA bilaterally, no wheezes, no crackles, no rhonchi.  Abdomen: Soft, LLQ tenderness, no peritoneal signs, non-distended, BS+.   MSK: Normal ROM of upper and lower extremities.  Neuro: Moves all extremities. No focal deficits noted.  Extremities: Peripheral pulses intact, no peripheral edema.  Skin: Warm and well perfused, no rashes noted.  Psych: Normal mood and affect.      Patient Instructions     Hold all supplements, " aspirin and NSAIDs for 7 days prior to surgery.  Follow your surgeon's direction on when to stop eating and drinking prior to surgery.  Your surgeon will be managing your pain after your surgery.          Labs 9/20/18:  CBC- pending at time of note  UPT- NEGATIVE    Immunization History   Administered Date(s) Administered     HPV Quadrivalent 08/12/2014, 09/25/2014, 02/26/2015     Hep A, historic 08/12/2014, 02/26/2015     Hep B, historic 02/25/2014, 03/28/2014, 09/25/2014     IPV 08/12/2014, 09/25/2014, 03/26/2015     Influenza, inj, historic,unspecified 02/22/2016     Influenza, seasonal,quad inj 36+ mos 09/20/2018     Influenza,seasonal quad, PF, 36+MOS 03/08/2018     MMR 02/25/2014, 03/28/2014     Meningococcal MCV4 Conjugate,Unspecified 08/12/2014     Td,adult,historic,unspecified 02/25/2014     Tdap 08/12/2014, 02/26/2015     Varicella 08/12/2014, 09/25/2014         Electronically signed by Flor Stoddard MD 09/20/18 10:26 AM

## 2021-06-20 NOTE — ANESTHESIA POSTPROCEDURE EVALUATION
Patient: [facility]  LAPAROSCOPY, HYSTEROSCOPY with tubal dye study  Anesthesia type: general    Patient location: p2  Last vitals:   Vitals:    09/25/18 0920   BP: 118/64   Pulse: 84   Resp:    Temp:    SpO2: 99%     Post vital signs: stable  Level of consciousness: awake and responds to simple questions  Post-anesthesia pain: pain controlled  Post-anesthesia nausea and vomiting: no  Pulmonary: unassisted, return to baseline  Cardiovascular: stable and blood pressure at baseline  Hydration: adequate  Anesthetic events: no    QCDR Measures:  ASA# 11 - Nisa-op Cardiac Arrest: ASA11B - Patient did NOT experience unanticipated cardiac arrest  ASA# 12 - Nisa-op Mortality Rate: ASA12B - Patient did NOT die  ASA# 13 - PACU Re-Intubation Rate: ASA13B - Patient did NOT require a new airway mgmt  ASA# 10 - Composite Anes Safety: ASA10A - No serious adverse event    Additional Notes:

## 2021-06-20 NOTE — ANESTHESIA PREPROCEDURE EVALUATION
"Anesthesia Evaluation      Patient summary reviewed   No history of anesthetic complications     Airway   Mallampati: II  Neck ROM: full   Pulmonary - negative ROS and normal exam                          Cardiovascular - negative ROS and normal exam   Neuro/Psych - negative ROS     Endo/Other - negative ROS      GI/Hepatic/Renal - negative ROS      Other findings:     Hgb 13.4    PREGNANCY: negative      Dental - normal exam                        Anesthesia Plan  Planned anesthetic: general endotracheal  Scopolamine patch  Zofran/Decadron  Toradol 15 mg IV if \"ok\" with Dr. Wren  ASA 1   Induction: intravenous   Anesthetic plan and risks discussed with: patient, spouse and  services used  Anesthesia plan special considerations: antiemetics,   Post-op plan: routine recovery          "

## 2021-06-21 NOTE — PROGRESS NOTES
"  Chief Complaint   Patient presents with     Acne         HPI:   Colten Meade is a 23 y.o. female with phone  c/o acne that has worsened over the last year.  She has not tried any medication.    ROS:  A 10 point comprehensive review of systems was negative except as noted.     Medications:  Current Outpatient Prescriptions on File Prior to Visit   Medication Sig Dispense Refill     prenat.vits,doug,min-iron-folic (PRENATAL VITAMIN) Tab Take 1 tablet by mouth daily. 100 each 3     HYDROcodone-acetaminophen (NORCO) 5-325 mg per tablet Take 1 tablet by mouth every 4 (four) hours as needed for pain. 15 tablet 0     No current facility-administered medications on file prior to visit.          Social History:  Social History   Substance Use Topics     Smoking status: Never Smoker     Smokeless tobacco: Never Used     Alcohol use No         Physical Exam:   Vitals:    10/29/18 1926   BP: 108/76   Patient Site: Left Arm   Patient Position: Sitting   Cuff Size: Adult Regular   Pulse: 77   Resp: 16   Temp: 98.7  F (37.1  C)   TempSrc: Oral   SpO2: 100%   Weight: 124 lb 4 oz (56.4 kg)   Height: 4' 10\" (1.473 m)       GEN:  NAD  FACE: multiple erythematous papules over face        Assessment/Plan:    1. Acne vulgaris  clindamycin (CLEOCIN T) 1 % external solution      Application of bland moisturizers (cerave, cetaphil)  and sunscreen were recommended.  Avoid of any OTC anti-acne products for the time-being as these can be irritating.  Wash the face gently with fingertips and liquid soap like cetaphil and dry it gently by dabbing.  Wash the skin immediately after vigorous exercise or when visibly soiled.  Acne medication as prescribed.  Discussed may worsen before improving in 6-8 weeks.             Magnolia Burton MD      10/29/2018    The following portions of the patient's history were reviewed and updated as appropriate: allergies, current medications, past family history, past medical history, past social history, " past surgical history and problem list.

## 2021-06-22 NOTE — PROGRESS NOTES
Assessment/Plan:     Colten Meade is a 24 y.o. No obstetric history on file. here for confirmation of pregnancy.    Amenorrhea/Pregnancy: Has been trying to get pregnant for over 2 years. Was referred to OB/GYN for infertility and had tubal dye study. Her LMP 11/12/18- within days. UPT today positive. MELODY 8/19 based on LMP, putting her at 7w4d today. Denies bleeding or cramping- discussed miscarriage signs/symptoms. She is having significant nausea and vomiting- will try unisom and vitamin B6. Started on prenatal vitamins. Set up ultrasound for dating- return in 2 weeks for IOB appointment.   - Pregnancy, Urine  - Unisom  - Vitamin B6  - Prenatal vitamins  - Ref Amb OB- dating ultrasound in 1-2 weeks.    Medications were reviewed for safety in pregnancy.  Risk factors identified today: history of miscarriage at 4 months? [place] Need to further discuss and evaluate at IOB appointment.    Return to clinic in about 2 weeks for Initial OB Visit.    Subjective:      Colten Meade is a 24 y.o. female who presents for evaluation of amenorrhea and pregnancy symptoms.  Patient's last menstrual period was 11/12/2018 (within days).  Last period was normal  Current contraception:  None, has been trying for pregnancy. Had tubal dye study with Dr. Wren a few months ago.  Pregnancy is desired.   Current symptoms also include: fatigue, morning sickness, nausea and positive home pregnancy test.     Risk behaviors:    Tobacco use:  reports that  has never smoked. she has never used smokeless tobacco.  Drug or alcohol use: no      Current Outpatient Medications:      doxylamine (UNISOM) 25 mg tablet, Take 0.5-1 tablets (12.5-25 mg total) by mouth 2 (two) times a day as needed for nausea (Nausea)., Disp: 90 tablet, Rfl: 1     prenat.vits,doug,min-iron-folic (PRENATAL VITAMIN) Tab, Take 1 tablet by mouth daily., Disp: 100 each, Rfl: 3     pyridoxine, vitamin B6, (VITAMIN B-6) 25 MG tablet, Take 1 tablet (25 mg total) by mouth 3 (three)  "times a day as needed (Nausea)., Disp: 90 tablet, Rfl: 1      Objective:      /74 (Patient Site: Left Arm, Patient Position: Sitting, Cuff Size: Adult Regular)   Pulse 70   Temp 98.3  F (36.8  C) (Oral)   Resp 16   Ht 4' 10\" (1.473 m)   Wt 122 lb (55.3 kg)   LMP 11/12/2018 (Within Days)   SpO2 99%   BMI 25.50 kg/m    Gen:  A&A, NAD.  Abd: soft, non-tender  FHT's: too early    Lab Review  Results for orders placed or performed in visit on 01/04/19   Pregnancy, Urine   Result Value Ref Range    Pregnancy Test, Urine Positive (!) Negative        Flor Stoddard MD    "

## 2021-06-22 NOTE — TELEPHONE ENCOUNTER
Upcoming Appointment Question  When is the appointment: Tomorrow, 1/4 at 1:00 PM  What is your appointment for?: nausea, vomiting for 2 months  Who is your appointment scheduled with?: PCP only  What is your question/concern?: Patient needs transportation arranged for her appointment tomorrow, 1/4 at 1:00 PM with Dr. Stoddard at [facility].   Okay to leave a detailed message?: Yes

## 2021-06-23 NOTE — PROGRESS NOTES
Interp: [id number]    Care Guide discussed enrolling in [facility] with [facility] and they report they are interested in scheduling a [facility] SW Assessment to determine if they have any needs or concerns with the patient being pregnant. Patient has an OB appointment at 10:40am on 3/15/19 and would like to see the SW following that OB appointment.    [facility] SW Assessment scheduled for Friday 3/15/19 at 11am.      Next Outreach: TBD by [facility] SW once the assessment has been completed

## 2021-06-23 NOTE — PROGRESS NOTES
New OB Clinic Note - 2019   Visit was completed along with [facility] .   SUBJECTIVE:  Colten Meade is a 24 y.o.  female @ 9w4d who is here for new OB visit.   LMP 18, exact. Ultrasound at 8w4d showed MELODY 19  Current Concerns: None.   She has mild nausea, improving. Taking B6. Does not take unisom anymore  She denies bleeding, cramping. She denies HA.   Denies dysuria or hematuria.   Denies constipation.  OB History:  Patient is . Prior Pregnancies:  OB History    Para Term  AB Living   3 2 1 1 0 1   SAB TAB Ectopic Multiple Live Births           1      # Outcome Date GA Lbr Dmitry/2nd Weight Sex Delivery Anes PTL Lv   3 Current            2 Term 10/10/12 40w0d  6 lb 6.3 oz (2.9 kg) F Vag-Spont None  TORITO      Birth Comments: in [facility]   1   18w0d   M    FD      Complications: Other Excessive Bleeding      Obstetric Comments   Hx of fetal demise at 4 months per patient in [facility].     She does have a history of  birth before 37 weeks with a cruz gestation. Fetal demise and then induced for fetal demise at 4 months per patient. She did not have  labor.  She does not have a history of preeclampsia in prior pregnancy.   She does not have a history of recurrent (>2) pregnancy losses.  She does not have a history of Down's syndrome, sickle cell anemia or other genetic disorder affecting a child in her family.  She does not have a history of major depression or postpartum depression.  She does have a history of STI's including Chlamydia, gonorrhea, Hep B virus, syphilis, HPV, or genital herpes.  Hx of chlamydia- treated and test of cure negative.  Social Hx:   She has good support from her family and father of baby, Noemi Yañez is involved.   She has not used tobacco, has not used EtOH and has not used illicit drugs.  Current Medications: prenatal vitamins, vitamin B6  History reviewed. No pertinent past medical history.  Past Surgical History:  "  Procedure Laterality Date     LASER LAPAROSCOPY Bilateral 9/25/2018    Procedure: LAPAROSCOPY, HYSTEROSCOPY with tubal dye study;  Surgeon: Boby Wren MD;  Location: [facility];  Service:      [facility]       Family History   Problem Relation Age of Onset     No Medical Problems Mother      Diabetes Father      Hypertension Father      Current Outpatient Medications on File Prior to Visit   Medication Sig Dispense Refill     prenat.vits,doug,min-iron-folic (PRENATAL VITAMIN) Tab Take 1 tablet by mouth daily. 100 each 3     pyridoxine, vitamin B6, (VITAMIN B-6) 25 MG tablet Take 1 tablet (25 mg total) by mouth 3 (three) times a day as needed (Nausea). 90 tablet 1     [DISCONTINUED] doxylamine (UNISOM) 25 mg tablet Take 0.5-1 tablets (12.5-25 mg total) by mouth 2 (two) times a day as needed for nausea (Nausea). 90 tablet 1     No current facility-administered medications on file prior to visit.      ?  OBJECTIVE:  Vitals:    01/18/19 0936   BP: 104/80   Patient Site: Left Arm   Patient Position: Sitting   Cuff Size: Adult Regular   Pulse: 66   Resp: 16   Temp: 98.3  F (36.8  C)   TempSrc: Oral   SpO2: 99%   Weight: 121 lb (54.9 kg)   Height: 4' 10\" (1.473 m)     Gen: Awake, alert, in no acute distress  HEENT: oral mucosa is moist and pink  Neck: Thyroid is non-enlarged, without nodules or tenderness  CV: RRR with normal S1 and S2. No murmurs appreciated.  Resp: Lungs are clear to auscultation bilaterally without crackles or wheezing.  Abd: non-tender, non-distended. Bowel sounds present.   Pelvic Exam: Deferred per patient request. She is not due for a pap smear today.  Lower extremities: No edema  Neuro: No focal deficits  FHT too early.  Results for orders placed or performed in visit on 01/18/19   Urinalysis Macroscopic   Result Value Ref Range    Color, UA Yellow Colorless, Yellow, Straw, Light Yellow    Clarity, UA Clear Clear    Glucose, UA Negative Negative    Bilirubin, UA Negative Negative    " Ketones, UA 15 mg/dL (!) Negative    Specific Gravity, UA 1.025 1.005 - 1.030    Blood, UA Negative Negative    pH, UA 6.0 5.0 - 8.0    Protein, UA Negative Negative mg/dL    Urobilinogen, UA 0.2 E.U./dL 0.2 E.U./dL, 1.0 E.U./dL    Nitrite, UA Negative Negative    Leukocytes, UA Negative Negative     ASSESSMENT/PLAN:  Colten Meade is a 24 y.o.  at 9w4d weeks by exact LMP c/w 8 wk US. Estimated Date of Delivery: 19.   1. Discussed recommended weight gain, healthy eating habits, exercise, common first trimester concerns (nausea, vomiting, constipation, fatigue, GERD, urinary frequency) and warning signs for miscarriage and  labor (bleeding, cramping).   2. Started prenatal vitamins.   3. Prenatal labs completed - prenatal profile, UA/UC, HIV   4. Reviewed eligibility for low-dose aspirin therapy for prevention of preeclampsia (preeclampsia in prior pregnancy, pre-existing HTN or DM, or multiple other risk factors including  delivery, chronic HTN, DM, obesity, low socioeconomic status, non- ethnicity). Patient is not a candidate for this.   5. Reviewed eligibility for 17-hydroxyprogesterone therapy for prevention of  birth (prior cruz delivery before 37 weeks). Patient is not a candidate for this. Hx of fetal demise at 4 months, not .  6. Offered first trimester screening for aneuploidy (trisomy 21, 13, 18) with laboratory and nuchal translucency. Patient declined this. Will discuss quad screen at next visit.  7. Counseled on benefits of breastfeeding. Patient is planning to breastfeed.   8. Discussed seatbelts, diet, exercise, caffeine intake, daily vitamins, child birth classes, choosing a baby doctor, and regular prenatal exams   Colten was seen today for initial prenatal visit.    Diagnoses and all orders for this visit:    Pregnancy, unspecified gestational age  -     ABO/RH Typing (OP order)  -     Hepatitis B Surface antigen (HBsAG)  -     HIV Antigen/Antibody  Screening [place]  -     HM1(CBC and Differential)  -     HML Antibody Screen  -     RPR  -     Rubella Immune Status (IgG)  -     Urinalysis Macroscopic  -     Culture, Urine  -     Chlamydia/gonorrhoeae CERVIX  -     Lead, Blood  -     Drugs of Abuse 1,Urine  -     HM1 (CBC with Diff)  -     Ambulatory referral to Care Management (Primary Care)      RTC in 4 weeks or sooner if problems. At next visit: discuss quad screen    Flor Stoddard MD    Options for treatment and follow-up care were reviewed with the patient and/or guardian. Colten Deshaun and/or guardian engaged in the decision making process and verbalized understanding of the options discussed and agreed with the final plan.

## 2021-06-24 NOTE — PROGRESS NOTES
"SUBJECTIVE:  Colten Meade is a 24 y.o. female  at 13w4d by exact LMP c/w 8 wk US. Estimated Date of Delivery: 19.  She is doing well. She denies nausea and vomiting- has resolved. She denies abdominal pain/cramping, vaginal bleeding, leakage of fluid. Fetal movement is absent.   Concerns: None.  ROS  Negative except as noted above in HPI.  OBJECTIVE:  Blood pressure 118/58, pulse 88, temperature 98.2  F (36.8  C), temperature source Oral, resp. rate 16, height 4' 10\" (1.473 m), weight 123 lb (55.8 kg), last menstrual period 2018, SpO2 99 %, currently breastfeeding.  Gen: comfortable, no acute distress.  See OB Vitals flowsheet.  ASSESSMENT/PLAN:  Colten was seen today for routine prenatal visit.    Diagnoses and all orders for this visit:    Pregnancy, unspecified gestational age/History of fetal demise: Discussed options including cell free DNA testing. She is amenable to this- she would like to know gender but DOES NOT want phone call- will find out at next visit.    History of chlamydia: Prior to pregnancy and was treated and had test of cure- but will test again during pregnancy.  -     Chlamydia trachomatis & Neisseria gonorrhoeae, Amplified Detection      -IUP at 13w4d:   Prenatal labs reviewed and Normal.   Fetal survey will be scheduled at next visit  Peripartum Anesthesia: the patient does not desire an epidural during labor. IV analgesia.  Post-partum Contraception: declines, had difficulty getting pregnant  Breast/Bottle: breastfeed  RTC in 4 weeks or sooner with problems.    [facility] .    Flor Stoddard MD    "

## 2021-06-25 NOTE — PROGRESS NOTES
"SUBJECTIVE:  Colten Meade is a 24 y.o. female  at 17w4d by LMP c/w 8 wk US. Estimated Date of Delivery: 19.  She is doing well. She denies nausea and vomiting. She denies abdominal pain/cramping, vaginal bleeding, leakage of fluid. Fetal movement is now present- she is feeling little movements  Concerns: None. Had progenity testing that was normal- it's a GIRL.   ROS  Negative except as noted above in HPI.  OBJECTIVE:  Blood pressure 110/74, pulse 84, temperature 97.8  F (36.6  C), temperature source Oral, resp. rate 16, height 4' 10\" (1.473 m), weight 124 lb 6 oz (56.4 kg), last menstrual period 2018, currently breastfeeding.  Gen: comfortable, no acute distress.  See OB Vitals flowsheet.  ASSESSMENT/PLAN:  Colten was seen today for routine prenatal visit.    Diagnoses and all orders for this visit:    Pregnancy, unspecified gestational age: Fetal survey in 2-3 weeks.  -     Ambulatory referral to Obstetrics / Gynecology      -IUP at 17w4d:   Prenatal labs reviewed and normal.  History of fetal demise at 4 months- NIPT normal.   Fetal survey scheduled today  Peripartum Anesthesia: IV analgesia  Post-partum Contraception: declines- had difficulty with infertility, will use condoms  Breast/Bottle: breastfeed   RTC in 4 weeks or sooner with problems.    Visit completed along with assistance of [place] .    Flor Stoddard MD    "

## 2021-06-25 NOTE — PROGRESS NOTES
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  Spouse does factory work.  Child attends  at [employer].  NO hx DV.  Spouse does not abuse substances.  Pt feels safe at home    Functional status- Independent    Advance Care Plan/Directive- None    Preventative Care- Dr Stoddard    Clinic Utilization- [facility]    Transportation - Pt drives and has shares car with spouse    Finances- Spouse earns $2000/month    Barriers in communication - Turkmen/Karenni speaking    Pain- None    Medication Concerns- N/A    Diet/Sleep/Exercise/Mood- Appetite is good, sleeps from 8pm to 6am and naps, mood is good    Assessment  SW to complete car seat referral beginning of June when pt is 7 months along.  Based on SW assessment today, pt may qualify for SNAP.  Pt open to referral to FRG to assist.  FRG will contact pt Monday.  No other goals identified.       Action Plan:    will:  1) Car seat and FRG referrals      Care Guide will:  See goals and enroll for FRG only      Goals        Patient Stated      I would like to see if my family qualifies for food stamps in the next 2 months (pt-stated)      Action steps to achieve this goal  1.  I will complete intake with FRG and provide all necessary paperwork and documentation  2.  I will update my care guide at outreach calls      Date goal set:  3/15/19

## 2021-06-25 NOTE — PROGRESS NOTES
Programs Applying For: SNAP  County:  Case #:  [facility] Worker:   Nandini #:   PMI #:   Tracking:     Outreach:   3/29/2019: FRG received bank statements from [facility] and faxed to [facility]. FRG will check status in 3 weeks on SNAP application.  3/22/2019: FRG met with patient at [facility] and applied for SNAP. Patient did not bring bank statements and will bring statements to [facility] next week. Patient will call FRG once she bring statements to clinic and FRG will fax to [facility].  3/18/2019: FRG called patient to screen/schedule. Appointment scheduled for 3/22 @ 10    Health Insurance Information:     Referral:   ------- FRG ONLY REFERRAL ------     I believe the [facility] BARRERA Dickson sent a remind me message for this patient but I was not sure if you needed one that looked for official like this.     Patient just needs help applying for SNAP benefits. It looks like Yessi created a goal around it, so please complete/delete the goal once you have resolved the SNAP benefit concerns.    SNAP/CASH Application Screenin. Have you already for [facility] benefits before? Yes, patient had benefits in October but the family closed it because they had to provide too much information  2. How many people in household? 3  3. What is your monthly income (include all tax members)? $2,000  4. Do you have a bank account? Yes, patient will bring most current 3 months   5. Do you have any utility bills (electricity, rent, mortgage, phone, insurance, medical bills, etc.)? yes  6. Do you have social security cards and/or green cards?

## 2021-06-26 NOTE — ANESTHESIA POSTPROCEDURE EVALUATION
Patient: [facility]  Procedure(s):  SUCTION DILATION AND CURETTAGE  Anesthesia type: MAC    Patient location: Phase II Recovery  Last vitals:   Vitals Value Taken Time   /79 06/18/21 0858   Temp  06/18/21 1100   Pulse 91 06/18/21 0910   Resp  06/18/21 1100   SpO2 98 % 06/18/21 0910   Vitals shown include unvalidated device data.  Post vital signs: stable  Level of consciousness: awake and responds to simple questions  Post-anesthesia pain: pain controlled  Post-anesthesia nausea and vomiting: no  Pulmonary: unassisted, return to baseline  Cardiovascular: stable and blood pressure at baseline  Hydration: adequate  Anesthetic events: no    QCDR Measures:  ASA# 11 - Nisa-op Cardiac Arrest: ASA11B - Patient did NOT experience unanticipated cardiac arrest  ASA# 12 - Nisa-op Mortality Rate: ASA12B - Patient did NOT die  ASA# 13 - PACU Re-Intubation Rate: ASA13B - Patient did NOT require a new airway mgmt  ASA# 10 - Composite Anes Safety: ASA10A - No serious adverse event    Additional Notes:

## 2021-06-26 NOTE — ANESTHESIA CARE TRANSFER NOTE
Last vitals:   Vitals:    06/18/21 0609   BP: 115/74   Pulse: 68   Resp: 16   Temp: 36.5  C (97.7  F)   SpO2: 99%     Patient's level of consciousness is drowsy  Spontaneous respirations: yes  Maintains airway independently: yes  Dentition unchanged: yes  Oropharynx: oropharynx clear of all foreign objects    QCDR Measures:  ASA# 20 - Surgical Safety Checklist: WHO surgical safety checklist completed prior to induction    PQRS# 430 - Adult PONV Prevention: 4558F - Pt received => 2 anti-emetic agents (different classes) preop & intraop  ASA# 8 - Peds PONV Prevention: NA - Not pediatric patient, not GA or 2 or more risk factors NOT present  PQRS# 424 - Nisa-op Temp Management: 4559F - At least one body temp DOCUMENTED => 35.5C or 95.9F within required timeframe  PQRS# 426 - PACU Transfer Protocol: - Transfer of care checklist used  ASA# 14 - Acute Post-op Pain: ASA14B - Patient did NOT experience pain >= 7 out of 10

## 2021-06-26 NOTE — ANESTHESIA PREPROCEDURE EVALUATION
Anesthesia Evaluation      Patient summary reviewed   No history of anesthetic complications     Airway   Mallampati: II  Neck ROM: full   Pulmonary - negative ROS and normal exam                          Cardiovascular - negative ROS and normal exam   Neuro/Psych - negative ROS     Endo/Other - negative ROS   (+) obesity (bmi 31),      GI/Hepatic/Renal - negative ROS      Other findings: Missed AB        Dental - normal exam                        Anesthesia Plan  Planned anesthetic: MAC  Versed/fentanyl/propofol  Ketamine PRN  Decadron/zofran  Toradol if OK with surgeon    ASA 2   Induction: intravenous   Anesthetic plan and risks discussed with: patient and  services used  Anesthesia plan special considerations: antiemetics,   Post-op plan: routine recovery      Results for orders placed or performed in visit on 06/16/21   COVID-19 Virus PCR MRF    Specimen: Respiratory   Result Value Ref Range    COVID-19 VIRUS SPECIMEN SOURCE Nasopharyngeal     2019-nCOV       Test received-See reflex to IDDL test SARS CoV2 (COVID-19) Virus RT-PCR   SARS-CoV-2 (COVID-19)-PCR    Specimen: Respiratory   Result Value Ref Range    SARS-CoV-2 Virus Specimen Source Nasopharyngeal     SARS-CoV-2 PCR Result NEGATIVE     SARS-COV-2 PCR COMMENT       Testing was performed using the Xpert Xpress SARS-CoV-2 Assay on the [employer]

## 2021-07-03 NOTE — ADDENDUM NOTE
Addendum Note by Flor Stoddard MD at 3/12/2018 12:21 PM     Author: Flor Stoddard MD Service: -- Author Type: Physician    Filed: 3/12/2018 12:21 PM Encounter Date: 3/8/2018 Status: Signed    : Flor Stodadrd MD (Physician)    Addended by: FLOR STODDARD on: 3/12/2018 12:21 PM        Modules accepted: Orders

## 2021-07-03 NOTE — ADDENDUM NOTE
Addendum Note by Flor Stoddard MD at 10/10/2019 10:20 AM     Author: Flor Stoddard MD Service: -- Author Type: Physician    Filed: 10/10/2019  5:35 PM Encounter Date: 10/10/2019 Status: Signed    : Flor Stoddard MD (Physician)    Addended by: FLOR STODDARD on: 10/10/2019 05:35 PM        Modules accepted: Orders

## 2021-07-06 VITALS
BODY MASS INDEX: 30.72 KG/M2 | WEIGHT: 147 LBS | HEIGHT: 58 IN | HEIGHT: 58 IN | BODY MASS INDEX: 31.89 KG/M2 | BODY MASS INDEX: 30.72 KG/M2 | WEIGHT: 147 LBS | BODY MASS INDEX: 30.72 KG/M2

## 2021-07-14 PROBLEM — Z34.90 PREGNANT: Status: RESOLVED | Noted: 2019-08-25 | Resolved: 2019-08-26

## 2021-07-14 PROBLEM — O35.EXX0 PYELECTASIS OF FETUS ON PRENATAL ULTRASOUND: Status: RESOLVED | Noted: 2019-06-21 | Resolved: 2019-10-10

## 2021-07-14 PROBLEM — O21.9 NAUSEA AND VOMITING IN PREGNANCY: Status: RESOLVED | Noted: 2019-01-04 | Resolved: 2019-01-18

## 2021-07-14 PROBLEM — Z34.90 PREGNANCY, UNSPECIFIED GESTATIONAL AGE: Status: RESOLVED | Noted: 2019-01-04 | Resolved: 2019-08-26

## 2021-09-03 ENCOUNTER — OFFICE VISIT (OUTPATIENT)
Dept: FAMILY MEDICINE | Facility: CLINIC | Age: 26
End: 2021-09-03
Payer: COMMERCIAL

## 2021-09-03 VITALS
WEIGHT: 147 LBS | TEMPERATURE: 98.7 F | HEIGHT: 58 IN | BODY MASS INDEX: 30.86 KG/M2 | HEART RATE: 82 BPM | OXYGEN SATURATION: 98 %

## 2021-09-03 DIAGNOSIS — Z86.19 HISTORY OF CHLAMYDIA: ICD-10-CM

## 2021-09-03 DIAGNOSIS — Z31.69 ENCOUNTER FOR PRECONCEPTION CONSULTATION: ICD-10-CM

## 2021-09-03 DIAGNOSIS — Z12.4 PAP SMEAR FOR CERVICAL CANCER SCREENING: Primary | ICD-10-CM

## 2021-09-03 PROCEDURE — 99214 OFFICE O/P EST MOD 30 MIN: CPT | Performed by: FAMILY MEDICINE

## 2021-09-03 PROCEDURE — 87491 CHLMYD TRACH DNA AMP PROBE: CPT | Performed by: FAMILY MEDICINE

## 2021-09-03 PROCEDURE — G0123 SCREEN CERV/VAG THIN LAYER: HCPCS | Performed by: FAMILY MEDICINE

## 2021-09-03 PROCEDURE — 87591 N.GONORRHOEAE DNA AMP PROB: CPT | Performed by: FAMILY MEDICINE

## 2021-09-03 RX ORDER — PNV NO.95/FERROUS FUM/FOLIC AC 28MG-0.8MG
1 TABLET ORAL DAILY
Qty: 90 TABLET | Refills: 3 | Status: SHIPPED | OUTPATIENT
Start: 2021-09-03 | End: 2023-10-02

## 2021-09-03 ASSESSMENT — MIFFLIN-ST. JEOR: SCORE: 1286.54

## 2021-09-03 NOTE — PROGRESS NOTES
"SUBJECTIVE  Colten Meade is a 26 year old female here for:    Chief Complaint   Patient presents with     Gyn Exam     Due for pap smear  Last pap smear 3/8/18 and normal  No history of abnormal pap smear  Remote history of chlamydia- has been tested multiple times since then and negative  Has some occasional low back pain   Denies urinary or bowel concerns  She is sexually active with 1 male  She would like to be pregnant again- she is taking prenatal vitamins    ROS  See HPI    Reviewed Past Medical History, Medications, Family History and Social History in Epic and up to date with no new changes.    OBJECTIVE  Pulse 82   Temp 98.7  F (37.1  C) (Oral)   Ht 1.473 m (4' 10\")   Wt 66.7 kg (147 lb)   LMP 08/27/2021   SpO2 98%   BMI 30.72 kg/m       General: Cooperative, pleasant, in no acute distress   (female): External genitalia is without lesions. Introitus is normal, vaginal walls pink and moist without lesions or evidence of trauma. No cervical lesions or discharge      LABS & IMAGES   No results found for any visits on 09/03/21.      ASSESSMENT/PLAN:   Colten was seen today for gyn exam.    Diagnoses and all orders for this visit:    Pap smear for cervical cancer screening: Normal pelvic exam  -     Pap screen reflex to HPV if ASCUS - recommend age 25 - 29    History of chlamydia: Asymptomatic screening  -     Chlamydia trachomatis PCR - Clinic Collect  -     Neisseria gonorrhoeae PCR - Clinic Collect      Encounter for preconception consultation: Reviewed healthy lifestyle and importance of prenatal vitamins.  -     Prenatal Vit-Fe Fumarate-FA (PRENATAL VITAMIN) 27-0.8 MG TABS; Take 1 tablet by mouth daily          [facility]     Options for treatment and follow-up care were reviewed with the patient. Colten Deshaun and/or guardian was engaged and actively involved in the decision making process. Colten Deshaun and/or guardian verbalized understanding of the options discussed and was " satisfied with the final plan.      Flor Stoddard MD

## 2021-09-04 LAB
C TRACH DNA SPEC QL NAA+PROBE: NEGATIVE
N GONORRHOEA DNA SPEC QL NAA+PROBE: NEGATIVE

## 2021-09-08 LAB
BKR LAB AP GYN ADEQUACY: NORMAL
BKR LAB AP GYN INTERPRETATION: NORMAL
BKR LAB AP HPV REFLEX: NORMAL
BKR LAB AP LMP: NORMAL
BKR LAB AP PREVIOUS ABNORMAL: NORMAL
PATH REPORT.COMMENTS IMP SPEC: NORMAL
PATH REPORT.RELEVANT HX SPEC: NORMAL

## 2022-01-12 VITALS — WEIGHT: 147 LBS | HEIGHT: 58 IN | BODY MASS INDEX: 30.86 KG/M2

## 2022-01-18 VITALS
OXYGEN SATURATION: 99 % | RESPIRATION RATE: 18 BRPM | HEART RATE: 70 BPM | HEIGHT: 58 IN | WEIGHT: 138 LBS | BODY MASS INDEX: 28.97 KG/M2 | SYSTOLIC BLOOD PRESSURE: 124 MMHG | DIASTOLIC BLOOD PRESSURE: 72 MMHG | TEMPERATURE: 99.1 F

## 2022-01-18 VITALS — BODY MASS INDEX: 28.76 KG/M2 | HEIGHT: 58 IN | WEIGHT: 137 LBS

## 2022-08-12 ENCOUNTER — LAB REQUISITION (OUTPATIENT)
Dept: LAB | Facility: CLINIC | Age: 27
End: 2022-08-12
Payer: COMMERCIAL

## 2022-08-12 DIAGNOSIS — Z36.85 ENCOUNTER FOR ANTENATAL SCREENING FOR STREPTOCOCCUS B: ICD-10-CM

## 2022-08-12 PROCEDURE — 87653 STREP B DNA AMP PROBE: CPT | Mod: ORL | Performed by: NURSE PRACTITIONER

## 2022-08-14 LAB — GP B STREP DNA SPEC QL NAA+PROBE: NEGATIVE

## 2022-09-15 ENCOUNTER — TRANSFERRED RECORDS (OUTPATIENT)
Dept: HEALTH INFORMATION MANAGEMENT | Facility: CLINIC | Age: 27
End: 2022-09-15

## 2022-09-15 ENCOUNTER — HOSPITAL ENCOUNTER (INPATIENT)
Facility: HOSPITAL | Age: 27
LOS: 1 days | Discharge: HOME | End: 2022-09-16
Attending: OBSTETRICS & GYNECOLOGY | Admitting: OBSTETRICS & GYNECOLOGY
Payer: COMMERCIAL

## 2022-09-15 DIAGNOSIS — Z34.90 PREGNANCY, UNSPECIFIED GESTATIONAL AGE: Primary | ICD-10-CM

## 2022-09-15 LAB
ABO/RH(D): NORMAL
ANTIBODY SCREEN: NEGATIVE
SARS-COV-2 RNA RESP QL NAA+PROBE: NEGATIVE
SPECIMEN EXPIRATION DATE: NORMAL

## 2022-09-15 PROCEDURE — 250N000013 HC RX MED GY IP 250 OP 250 PS 637: Performed by: OBSTETRICS & GYNECOLOGY

## 2022-09-15 PROCEDURE — 722N000001 HC LABOR CARE VAGINAL DELIVERY SINGLE

## 2022-09-15 PROCEDURE — 86780 TREPONEMA PALLIDUM: CPT | Performed by: OBSTETRICS & GYNECOLOGY

## 2022-09-15 PROCEDURE — 250N000009 HC RX 250: Performed by: OBSTETRICS & GYNECOLOGY

## 2022-09-15 PROCEDURE — 120N000001 HC R&B MED SURG/OB

## 2022-09-15 PROCEDURE — 86901 BLOOD TYPING SEROLOGIC RH(D): CPT | Performed by: OBSTETRICS & GYNECOLOGY

## 2022-09-15 PROCEDURE — 0KQM0ZZ REPAIR PERINEUM MUSCLE, OPEN APPROACH: ICD-10-PCS | Performed by: OBSTETRICS & GYNECOLOGY

## 2022-09-15 PROCEDURE — U0005 INFEC AGEN DETEC AMPLI PROBE: HCPCS | Performed by: OBSTETRICS & GYNECOLOGY

## 2022-09-15 PROCEDURE — 250N000009 HC RX 250

## 2022-09-15 PROCEDURE — 36415 COLL VENOUS BLD VENIPUNCTURE: CPT | Performed by: OBSTETRICS & GYNECOLOGY

## 2022-09-15 RX ORDER — PROCHLORPERAZINE 25 MG
25 SUPPOSITORY, RECTAL RECTAL EVERY 12 HOURS PRN
Status: DISCONTINUED | OUTPATIENT
Start: 2022-09-15 | End: 2022-09-15 | Stop reason: HOSPADM

## 2022-09-15 RX ORDER — OXYTOCIN/0.9 % SODIUM CHLORIDE 30/500 ML
340 PLASTIC BAG, INJECTION (ML) INTRAVENOUS CONTINUOUS PRN
Status: DISCONTINUED | OUTPATIENT
Start: 2022-09-15 | End: 2022-09-16 | Stop reason: HOSPADM

## 2022-09-15 RX ORDER — NALOXONE HYDROCHLORIDE 0.4 MG/ML
0.2 INJECTION, SOLUTION INTRAMUSCULAR; INTRAVENOUS; SUBCUTANEOUS
Status: DISCONTINUED | OUTPATIENT
Start: 2022-09-15 | End: 2022-09-15 | Stop reason: HOSPADM

## 2022-09-15 RX ORDER — MISOPROSTOL 200 UG/1
400 TABLET ORAL
Status: DISCONTINUED | OUTPATIENT
Start: 2022-09-15 | End: 2022-09-15 | Stop reason: HOSPADM

## 2022-09-15 RX ORDER — METOCLOPRAMIDE HYDROCHLORIDE 5 MG/ML
10 INJECTION INTRAMUSCULAR; INTRAVENOUS EVERY 6 HOURS PRN
Status: DISCONTINUED | OUTPATIENT
Start: 2022-09-15 | End: 2022-09-15 | Stop reason: HOSPADM

## 2022-09-15 RX ORDER — OXYTOCIN 10 [USP'U]/ML
10 INJECTION, SOLUTION INTRAMUSCULAR; INTRAVENOUS
Status: DISCONTINUED | OUTPATIENT
Start: 2022-09-15 | End: 2022-09-15 | Stop reason: HOSPADM

## 2022-09-15 RX ORDER — METOCLOPRAMIDE 10 MG/1
10 TABLET ORAL EVERY 6 HOURS PRN
Status: DISCONTINUED | OUTPATIENT
Start: 2022-09-15 | End: 2022-09-15 | Stop reason: HOSPADM

## 2022-09-15 RX ORDER — ONDANSETRON 2 MG/ML
4 INJECTION INTRAMUSCULAR; INTRAVENOUS EVERY 6 HOURS PRN
Status: DISCONTINUED | OUTPATIENT
Start: 2022-09-15 | End: 2022-09-15 | Stop reason: HOSPADM

## 2022-09-15 RX ORDER — OXYTOCIN 10 [USP'U]/ML
10 INJECTION, SOLUTION INTRAMUSCULAR; INTRAVENOUS
Status: DISCONTINUED | OUTPATIENT
Start: 2022-09-15 | End: 2022-09-16 | Stop reason: HOSPADM

## 2022-09-15 RX ORDER — METHYLERGONOVINE MALEATE 0.2 MG/ML
200 INJECTION INTRAVENOUS
Status: DISCONTINUED | OUTPATIENT
Start: 2022-09-15 | End: 2022-09-16 | Stop reason: HOSPADM

## 2022-09-15 RX ORDER — CARBOPROST TROMETHAMINE 250 UG/ML
250 INJECTION, SOLUTION INTRAMUSCULAR
Status: DISCONTINUED | OUTPATIENT
Start: 2022-09-15 | End: 2022-09-16 | Stop reason: HOSPADM

## 2022-09-15 RX ORDER — FENTANYL CITRATE 50 UG/ML
50 INJECTION, SOLUTION INTRAMUSCULAR; INTRAVENOUS EVERY 30 MIN PRN
Status: DISCONTINUED | OUTPATIENT
Start: 2022-09-15 | End: 2022-09-15 | Stop reason: HOSPADM

## 2022-09-15 RX ORDER — DOCUSATE SODIUM 100 MG/1
100 CAPSULE, LIQUID FILLED ORAL 2 TIMES DAILY
Status: DISCONTINUED | OUTPATIENT
Start: 2022-09-15 | End: 2022-09-16 | Stop reason: HOSPADM

## 2022-09-15 RX ORDER — MISOPROSTOL 200 UG/1
800 TABLET ORAL
Status: DISCONTINUED | OUTPATIENT
Start: 2022-09-15 | End: 2022-09-15 | Stop reason: HOSPADM

## 2022-09-15 RX ORDER — KETOROLAC TROMETHAMINE 30 MG/ML
30 INJECTION, SOLUTION INTRAMUSCULAR; INTRAVENOUS
Status: DISCONTINUED | OUTPATIENT
Start: 2022-09-15 | End: 2022-09-16 | Stop reason: HOSPADM

## 2022-09-15 RX ORDER — CITRIC ACID/SODIUM CITRATE 334-500MG
30 SOLUTION, ORAL ORAL
Status: DISCONTINUED | OUTPATIENT
Start: 2022-09-15 | End: 2022-09-15 | Stop reason: HOSPADM

## 2022-09-15 RX ORDER — METHYLERGONOVINE MALEATE 0.2 MG/ML
200 INJECTION INTRAVENOUS
Status: DISCONTINUED | OUTPATIENT
Start: 2022-09-15 | End: 2022-09-15 | Stop reason: HOSPADM

## 2022-09-15 RX ORDER — NALOXONE HYDROCHLORIDE 0.4 MG/ML
0.4 INJECTION, SOLUTION INTRAMUSCULAR; INTRAVENOUS; SUBCUTANEOUS
Status: DISCONTINUED | OUTPATIENT
Start: 2022-09-15 | End: 2022-09-15 | Stop reason: HOSPADM

## 2022-09-15 RX ORDER — HYDROCORTISONE 25 MG/G
CREAM TOPICAL 3 TIMES DAILY PRN
Status: DISCONTINUED | OUTPATIENT
Start: 2022-09-15 | End: 2022-09-16 | Stop reason: HOSPADM

## 2022-09-15 RX ORDER — PROCHLORPERAZINE MALEATE 10 MG
10 TABLET ORAL EVERY 6 HOURS PRN
Status: DISCONTINUED | OUTPATIENT
Start: 2022-09-15 | End: 2022-09-15 | Stop reason: HOSPADM

## 2022-09-15 RX ORDER — IBUPROFEN 800 MG/1
800 TABLET, FILM COATED ORAL EVERY 6 HOURS PRN
Status: DISCONTINUED | OUTPATIENT
Start: 2022-09-15 | End: 2022-09-16 | Stop reason: HOSPADM

## 2022-09-15 RX ORDER — OXYTOCIN/0.9 % SODIUM CHLORIDE 30/500 ML
100-340 PLASTIC BAG, INJECTION (ML) INTRAVENOUS CONTINUOUS PRN
Status: DISCONTINUED | OUTPATIENT
Start: 2022-09-15 | End: 2022-09-16 | Stop reason: HOSPADM

## 2022-09-15 RX ORDER — MISOPROSTOL 200 UG/1
400 TABLET ORAL
Status: DISCONTINUED | OUTPATIENT
Start: 2022-09-15 | End: 2022-09-16 | Stop reason: HOSPADM

## 2022-09-15 RX ORDER — MODIFIED LANOLIN
OINTMENT (GRAM) TOPICAL
Status: DISCONTINUED | OUTPATIENT
Start: 2022-09-15 | End: 2022-09-16 | Stop reason: HOSPADM

## 2022-09-15 RX ORDER — ONDANSETRON 4 MG/1
4 TABLET, ORALLY DISINTEGRATING ORAL EVERY 6 HOURS PRN
Status: DISCONTINUED | OUTPATIENT
Start: 2022-09-15 | End: 2022-09-15 | Stop reason: HOSPADM

## 2022-09-15 RX ORDER — CARBOPROST TROMETHAMINE 250 UG/ML
250 INJECTION, SOLUTION INTRAMUSCULAR
Status: DISCONTINUED | OUTPATIENT
Start: 2022-09-15 | End: 2022-09-15 | Stop reason: HOSPADM

## 2022-09-15 RX ORDER — ACETAMINOPHEN 325 MG/1
650 TABLET ORAL EVERY 4 HOURS PRN
Status: DISCONTINUED | OUTPATIENT
Start: 2022-09-15 | End: 2022-09-16 | Stop reason: HOSPADM

## 2022-09-15 RX ORDER — MISOPROSTOL 200 UG/1
800 TABLET ORAL
Status: DISCONTINUED | OUTPATIENT
Start: 2022-09-15 | End: 2022-09-16 | Stop reason: HOSPADM

## 2022-09-15 RX ORDER — IBUPROFEN 800 MG/1
800 TABLET, FILM COATED ORAL
Status: DISCONTINUED | OUTPATIENT
Start: 2022-09-15 | End: 2022-09-16 | Stop reason: HOSPADM

## 2022-09-15 RX ORDER — LIDOCAINE HYDROCHLORIDE 10 MG/ML
INJECTION, SOLUTION EPIDURAL; INFILTRATION; INTRACAUDAL; PERINEURAL
Status: COMPLETED
Start: 2022-09-15 | End: 2022-09-15

## 2022-09-15 RX ORDER — OXYTOCIN/0.9 % SODIUM CHLORIDE 30/500 ML
340 PLASTIC BAG, INJECTION (ML) INTRAVENOUS CONTINUOUS PRN
Status: DISCONTINUED | OUTPATIENT
Start: 2022-09-15 | End: 2022-09-15 | Stop reason: HOSPADM

## 2022-09-15 RX ADMIN — DOCUSATE SODIUM 100 MG: 100 CAPSULE ORAL at 22:04

## 2022-09-15 RX ADMIN — IBUPROFEN 800 MG: 800 TABLET ORAL at 17:22

## 2022-09-15 RX ADMIN — ACETAMINOPHEN 650 MG: 325 TABLET, FILM COATED ORAL at 17:22

## 2022-09-15 RX ADMIN — ACETAMINOPHEN 650 MG: 325 TABLET, FILM COATED ORAL at 11:26

## 2022-09-15 RX ADMIN — LIDOCAINE HYDROCHLORIDE 20 ML: 10 INJECTION, SOLUTION EPIDURAL; INFILTRATION; INTRACAUDAL; PERINEURAL at 07:55

## 2022-09-15 RX ADMIN — Medication 340 ML/HR: at 07:55

## 2022-09-15 RX ADMIN — ACETAMINOPHEN 650 MG: 325 TABLET, FILM COATED ORAL at 22:03

## 2022-09-15 RX ADMIN — DOCUSATE SODIUM 100 MG: 100 CAPSULE ORAL at 11:27

## 2022-09-15 RX ADMIN — IBUPROFEN 800 MG: 800 TABLET ORAL at 11:26

## 2022-09-15 ASSESSMENT — ACTIVITIES OF DAILY LIVING (ADL)
ADLS_ACUITY_SCORE: 19
ADLS_ACUITY_SCORE: 18
ADLS_ACUITY_SCORE: 19

## 2022-09-15 NOTE — PLAN OF CARE
reports to triage for r/o labor. Pt SROM clear fluid- cvx 5-6cm. Reassuring FHT. +FM. Will admit to labor and delivery for labor.

## 2022-09-15 NOTE — LACTATION NOTE
COVID Follow-up Call 2    Situation: This patient triggered as a low risk positive COVID-19 after a recent clinical encounter. A care transitions call occurred and is summarized below.    Background: 71 year old male admitted 1/21-1/24 with covid pneumonia. HX: CAD, hyperlipidemia, HTN, CKD, pos covid test 1/15. Lives with wife     Assessment:   Covid-19 Symptom Assessment  Covid-19 Case Type: Positive Covid-19 Test ([facility])  Current Symptoms: Shortness of breath  Symptom Change Since Last Assessment: Improving  Self-Isolation Status (See Link in Sidebar): Release from self-isolation   Patient with no cough, fever, N/V or pain. Appetite is good and is having reg BMs. SOB when using the stairs. Patient has PCP appt on 2/3.     Additional topics reviewed with patient:   · Since last virtual visit, patient is feeling better  · Patient is taking all medications as prescribed  · Patient did not have questions or concerns regarding the medications prescribed    Recommendation:  Reinforced patient instructions provided by v-visit/ED provider, including continued self-monitoring of symptoms. Is no longer in self-quarantine.  Patient verbalized understanding to contact PCP for worsening symptoms.    Next CT phone call on 2/8       This note was copied from a baby's chart.  This writer met with Colten per lactation order.  She had infant wrapped in the swaddle at the breast in the cradle hold trying to latch onto the breast.  Infant had a shallow latch and struggled to sustain a latch.  Colten encouraged to take infant out of the swaddle and place infant skin to skin.  Hand expression demonstrated and Colten was able to express several large drops of colostrum which were finger fed to infant.  With infant in the cross cradle position and Colten shaping her breast to match infant's mouth, infant was able to latch deeply onto her breast and suck actively, rhythmically.  Occasional swallows heard.  Colten encouraged to keep infant active at the breast and listen for swallows and to feed infant at least 8 times in 24 hours, as infant cues.  Colten verbalizes understanding of all education given.  She denies any further questions.

## 2022-09-15 NOTE — L&D DELIVERY NOTE
Delivery Summary    Colten Meade MRN# [id number]   Age: 27 year old YOB: 1995     VAGINAL DELIVERY NOTE    PRE DELIVERY DIAGNOSIS  1) Intrauterine pregnancy at 41w1d   2) Spontaneous labor      POST DELIVERY DIAGNOSIS  1) Intrauterine pregnancy at 41w1d   2) Delivery of a viable female.  3) Apgars: 7 and 9  4) weight: pending  5) Shoulder dystocia    Delivery Method/Type:       PROVIDER:   Luana Ronquillo MD     ANESTHESIA: None    COMPLICATIONS: None    DESCRIPTION OF PROCEDURE  Colten Meade is a 27 year old  with prenatal care with Dr. Wren who was admitted at 41w1d for SROM. She had a normal labor course until second stage. She had a 30 second shoulder dystocia relieved with rotational maneuver and suprapubic pressure. Patient had a .  Delayed cord clamping performed.  No gross fetal defects were observed. Pitocin was administered. Placenta delivered intact with 3 vessel cord. The perineum was then evaluated and noted to have a 2nd degree laceration that was repaired in the usual manner with 3-0 Vicryl. Delivery QBL (mL): 50     Luana Ronquillo MD                          [facility], Female-Colten [[id number]]    Labor Event Times    Labor onset date: 9/15/22 Onset time:  3:00 AM CDT      Rupture date/time: 9/15/22 0500   Rupture type: Spontaneous rupture of membranes occuring during spontaneous labor or augmentation  Fluid color: Clear  Fluid odor: Normal     Delivery/Placenta Date and Time    Delivery Date: 9/15/22 Delivery Time:  7:50 AM   Placenta Date/Time: 9/15/2022  8:03 AM  Oxytocin given at the time of delivery: after delivery of baby  Delivering clinician: Luana Ronquillo MD   Other personnel present at delivery:  Provider Role   Kp Garcia RN Registered Nurse   Ronit Lebron RN Registered Nurse   Tegan Stuart RN Registered Nurse   Tegan Rachel RN Charge Nurse         Vaginal Counts          Needles Suture Needles Sponges (RETIRED) Instruments    Initial counts   5    Added to count 1 2     Relief counts       Final counts             Placed during labor Accounted for at the end of labor   FSE NA NA   IUPC NA NA   Cervidil NA NA                     Apgars    Living status: Living   1 Minute 5 Minute 10 Minute 15 Minute 20 Minute   Skin color: 0  1       Heart rate: 2  2       Reflex irritability: 1  2       Muscle tone: 2  2       Respiratory effort: 2  2       Total: 7  9       Apgars assigned by: TRUDY DONNELLY      Resuscitation    Output in Delivery Room: Stool     [place] Measurements    Output in delivery room: Stool     Labor Events and Shoulder Dystocia    Fetal Tracing Prior to Delivery: Category 2  Shoulder dystocia present?: Pos     Delivery (Maternal) (Provider to Complete) ([id number])       Blood Loss  Mother: Colten Meade #[id number]   Start of Mother's Information    Delivery Blood Loss  09/15/22 0300 - 09/15/22 0812    None           End of Mother's Information  Mother: Colten Meade #[id number]          Delivery - Provider to Complete ([id number])    Delivering clinician: Luana Ronquillo MD                   Other personnel:  Provider Role   Kp Garcia RN Registered Nurse   Ronit Lebron RN Registered Nurse   Tegan Stuart RN Registered Nurse   Tegan Rachel RN Charge Nurse                Placenta    Date/Time: 9/15/2022  8:03 AM                  Luana Ronquillo MD

## 2022-09-15 NOTE — PROGRESS NOTES
Late entry due to pt care. Arrival into room after report at 0719, logged in with , pt reports she feels like pushing SVE at documented, charge RN Ada call and request made for Dr. Ronquillo to bedside.

## 2022-09-15 NOTE — H&P
"OB HISTORY AND PHYSICAL UPDATE ADMISSION EXAM    Name:  Colten Meade  YOB: 1995  Medical Record Number: [id number]    History of Present Illness:  The patient came in with contractions.  She had spontaneous ROM once she arrived for evaluation.    Estimated Date of Delivery: Sep 7, 2022                       EGA 41w1d    OB History    Para Term  AB Living   7 3 2 1 2 2   SAB IAB Ectopic Multiple Live Births   2 0 0 0 2      # Outcome Date GA Lbr Dmitry/2nd Weight Sex Delivery Anes PTL Lv   7 Current            6 SAB 21     SAB         Birth Comments: s/p D&C   5 SAB 21 9w0d    SAB      4 Term 19 40w6d 06:20 / 02:21 3.655 kg (8 lb 0.9 oz) F Vag-Spont Local N TORITO      Name: NIA,FEMALE-COLTEN      Apgar1: 8  Apgar5: 9   3 Term 10/10/12 40w0d  2.9 kg (6 lb 6.3 oz) F Vag-Spont None  TORITO      Birth Comments: in [facility]   2   18w0d   M    FD      Complications: Other Excessive Bleeding   1                 Prenatal Complications: h/o ileostomy    Exam:    /76 (BP Location: Right arm, Patient Position: Semi-Jenkins's, Cuff Size: Adult Regular)   Pulse 61   Temp 98.3  F (36.8  C) (Oral)   Resp 17   Ht 1.448 m (4' 9\")   Wt 68.9 kg (152 lb)   LMP 2021   SpO2 100%   BMI 32.89 kg/m      Fetal Heart Rate Category 1  Contractions q 2-3 min        HEENT          WNL              Heart              WNL                Lungs             WNL                       Abdomen        WNL                       Extremities     WNL                       Vaginal exam 5-6 / 80% / -2    Assessment: Labor and ROM    Plan: Spontaneous labor, anticipate vaginal delivery    NOE MCBRIDE on 9/15/2022 at 6:49 AM    "

## 2022-09-15 NOTE — PLAN OF CARE
Problem: Plan of Care - These are the overarching goals to be used throughout the patient stay.    Goal: Plan of Care Review/Shift Note  Description: The Plan of Care Review/Shift note should be completed every shift.  The Outcome Evaluation is a brief statement about your assessment that the patient is improving, declining, or no change.  This information will be displayed automatically on your shift note.  Outcome: Ongoing, Progressing   VSS. Up and voided. Rated pain at 4-5 and took prn Ibuprofen and Tylenol which were helping. OB checks were WNL. To monitor.

## 2022-09-15 NOTE — PROGRESS NOTES
Call placed to Dr Reed on behalf of CLEM Tinoco regarding pt arrival. SVE 6/thick/-1, SROM clear. , GBS negative. Initial CAT II strip, now CAT I UTCs q 3-4. Karenni speaking only.

## 2022-09-16 VITALS
DIASTOLIC BLOOD PRESSURE: 64 MMHG | RESPIRATION RATE: 16 BRPM | HEART RATE: 64 BPM | TEMPERATURE: 98 F | WEIGHT: 152 LBS | SYSTOLIC BLOOD PRESSURE: 103 MMHG | BODY MASS INDEX: 32.79 KG/M2 | HEIGHT: 57 IN | OXYGEN SATURATION: 97 %

## 2022-09-16 PROBLEM — Z34.90 PREGNANCY: Status: RESOLVED | Noted: 2022-09-15 | Resolved: 2022-09-16

## 2022-09-16 LAB — T PALLIDUM AB SER QL: NONREACTIVE

## 2022-09-16 PROCEDURE — 250N000013 HC RX MED GY IP 250 OP 250 PS 637: Performed by: OBSTETRICS & GYNECOLOGY

## 2022-09-16 RX ORDER — IBUPROFEN 600 MG/1
600 TABLET, FILM COATED ORAL EVERY 6 HOURS PRN
Qty: 40 TABLET | Refills: 0 | Status: SHIPPED | OUTPATIENT
Start: 2022-09-16 | End: 2023-10-02

## 2022-09-16 RX ORDER — ACETAMINOPHEN 500 MG
500-1000 TABLET ORAL EVERY 6 HOURS PRN
Qty: 60 TABLET | Refills: 0 | Status: SHIPPED | OUTPATIENT
Start: 2022-09-16 | End: 2023-10-02

## 2022-09-16 RX ADMIN — DOCUSATE SODIUM 100 MG: 100 CAPSULE ORAL at 09:22

## 2022-09-16 ASSESSMENT — ACTIVITIES OF DAILY LIVING (ADL)
ADLS_ACUITY_SCORE: 18

## 2022-09-16 ASSESSMENT — EDINBURGH POSTNATAL DEPRESSION SCALE (EPDS): TOTAL SCORE: 0

## 2022-09-16 NOTE — PLAN OF CARE
"  Problem: Plan of Care - These are the overarching goals to be used throughout the patient stay.    Goal: Plan of Care Review/Shift Note  Description: The Plan of Care Review/Shift note should be completed every shift.  The Outcome Evaluation is a brief statement about your assessment that the patient is improving, declining, or no change.  This information will be displayed automatically on your shift note.  Outcome: Met  Goal: Patient-Specific Goal (Individualized)  Description: You can add care plan individualizations to a care plan. Examples of Individualization might be:  \"Parent requests to be called daily at 9am for status\", \"I have a hard time hearing out of my right ear\", or \"Do not touch me to wake me up as it startles me\".  Outcome: Met  Goal: Absence of Hospital-Acquired Illness or Injury  Outcome: Met  Intervention: Prevent Skin Injury  Recent Flowsheet Documentation  Taken 9/16/2022 0813 by Schoenberg, Karen J, RN  Body Position: position changed independently  Intervention: Prevent and Manage VTE (Venous Thromboembolism) Risk  Recent Flowsheet Documentation  Taken 9/16/2022 0813 by Schoenberg, Karen J, RN  Activity Management: up ad luba  Goal: Optimal Comfort and Wellbeing  Outcome: Met  Intervention: Provide Person-Centered Care  Recent Flowsheet Documentation  Taken 9/16/2022 0813 by Schoenberg, Karen J, RN  Trust Relationship/Rapport: care explained  Goal: Readiness for Transition of Care  Outcome: Met     Problem: Plan of Care - These are the overarching goals to be used throughout the patient stay.    Goal: Optimal Comfort and Wellbeing  Outcome: Met  Intervention: Provide Person-Centered Care  Recent Flowsheet Documentation  Taken 9/16/2022 0813 by Schoenberg, Karen J, RN  Trust Relationship/Rapport: care explained     Problem: Plan of Care - These are the overarching goals to be used throughout the patient stay.    Goal: Readiness for Transition of Care  Outcome: Met     Problem: Bleeding " (Labor)  Goal: Hemostasis  Outcome: Met     Discharge education completed regarding self care and follow up care plan. Pt states understanding of plan and resources.   Karen J Schoenberg, RN

## 2022-09-16 NOTE — PROGRESS NOTES
"Outreach Nurse Note    Colten Meade  [id number]  1995    Chart reviewed, discharge plan discussed with patient's bedside RN, needs assessed. Patient, Colten Meade, requests home care visit as ordered for baby, nurse visit planned for ,  ([facility]  needed), Home Care Intake updated by this writer. Post-delivery follow-up appointment planned in 6 weeks at OB clinic.    Colten Meade is reported to have support at home and feels ready to discharge today with , \"Cassi Rebolledo\". Outreach RN will continue to follow and assist as needed with discharge plan. No additional needs identified at this time.    "

## 2022-09-16 NOTE — PLAN OF CARE
Problem: Adjustment to Role Transition (Postpartum Vaginal Delivery)  Goal: Successful Maternal Role Transition  Outcome: Ongoing, Progressing  Intervention: Support Maternal Role Transition  Recent Flowsheet Documentation  Taken 9/15/2022 2300 by Shelby Martinez RN  Parent/Child Attachment Promotion: caring behavior modeled     Problem: Bleeding (Postpartum Vaginal Delivery)  Goal: Hemostasis  Outcome: Ongoing, Progressing   Fundus is firm below 2, minimal bleeding   Problem: Pain (Postpartum Vaginal Delivery)  Goal: Acceptable Pain Control  Outcome: Ongoing, Progressing  Intervention: Prevent or Manage Pain  Recent Flowsheet Documentation  Taken 9/15/2022 2200 by Shelby Martinez RN  Pain Management Interventions: medication (see MAR)  Patient is managing pain with Ibuprofen and Tylenol.   Postpartum assessment WNL.   Patient is hopeful for discharge to home today.

## 2022-11-01 ENCOUNTER — LAB REQUISITION (OUTPATIENT)
Dept: LAB | Facility: CLINIC | Age: 27
End: 2022-11-01
Payer: COMMERCIAL

## 2022-11-01 DIAGNOSIS — Z12.4 ENCOUNTER FOR SCREENING FOR MALIGNANT NEOPLASM OF CERVIX: ICD-10-CM

## 2022-11-01 PROCEDURE — G0145 SCR C/V CYTO,THINLAYER,RESCR: HCPCS | Performed by: OBSTETRICS & GYNECOLOGY

## 2022-11-03 LAB
BKR LAB AP GYN ADEQUACY: NORMAL
BKR LAB AP GYN INTERPRETATION: NORMAL
BKR LAB AP HPV REFLEX: NORMAL
BKR LAB AP LMP: NORMAL
BKR LAB AP PREVIOUS ABNL DX: NORMAL
BKR LAB AP PREVIOUS ABNORMAL: NORMAL
PATH REPORT.COMMENTS IMP SPEC: NORMAL
PATH REPORT.COMMENTS IMP SPEC: NORMAL
PATH REPORT.RELEVANT HX SPEC: NORMAL

## 2023-02-06 NOTE — PROGRESS NOTES
"Assessment/Plan:     Colten Meade is a 25 y.o.  here for confirmation of pregnancy.    Colten was seen today for possible pregnancy.    Diagnoses and all orders for this visit:    Pregnancy, unspecified gestational age:  at 9w5d based on LMP today. Will obtain dating US to confirm.   -     prenatal vit no.130-iron-folic (PRENATAL VITAMIN) 27 mg iron- 800 mcg Tab tablet; Take 1 tablet by mouth daily.  -     US OB < 14 Weeks; Future  -     Pregnancy, Urine    Nausea: Mild per patient report- declines medication. Reviewed non-pharmacologic treatments.    Medications were reviewed for safety in pregnancy.  Risk factors identified today: history of 4 month demise with first pregnancy    Return to clinic in about 2 weeks for Initial OB Visit.    Subjective:      Colten Meade is a 25 y.o. female who presents for evaluation of amenorrhea  Patient's last menstrual period was 10/04/2020 (exact date).   MELODY 20, 9w5d today based on LMP  Last period was normal.  Current contraception:  none   Pregnancy is desired.   Current symptoms also include: morning sickness and nausea- mild- not interested in medication at this time.  She had difficulty getting pregnant/infertility previously- only took 3-4 months this time.    Risk behaviors:    Tobacco use:  reports that she has never smoked. She has never used smokeless tobacco.  Drug or alcohol use: no      Current Outpatient Medications:      acetaminophen (TYLENOL EXTRA STRENGTH) 500 MG tablet, Take 1-2 tablets (500-1,000 mg total) by mouth daily as needed for pain (headache)., Disp: 100 tablet, Rfl: 2     prenatal vit no.130-iron-folic (PRENATAL VITAMIN) 27 mg iron- 800 mcg Tab tablet, Take 1 tablet by mouth daily., Disp: 90 tablet, Rfl: 3       Objective:      /72   Pulse 70   Temp 99.1  F (37.3  C) (Oral)   Resp 18   Ht 4' 10\" (1.473 m)   Wt 138 lb (62.6 kg)   LMP 10/04/2020 (Exact Date)   SpO2 99%   BMI 28.84 kg/m    Gen:  A&A, NAD.  Abd: soft, non-tender  FHT's: " Refilled per medication protocol. But patient is due for an upcoming physical appt, to non clinical to schedule. Thanks.   too early    Lab Review  Results for orders placed or performed in visit on 12/11/20   Pregnancy, Urine   Result Value Ref Range    Pregnancy Test, Urine Positive (!) Negative       Flor Stoddard MD

## 2023-03-16 ENCOUNTER — TELEPHONE (OUTPATIENT)
Dept: TRANSPLANT | Age: 28
End: 2023-03-16

## 2023-04-19 ENCOUNTER — OFFICE VISIT (OUTPATIENT)
Dept: TRANSPLANT | Age: 28
End: 2023-04-19
Attending: INTERNAL MEDICINE

## 2023-04-19 ENCOUNTER — LAB SERVICES (OUTPATIENT)
Dept: LAB | Age: 28
End: 2023-04-19
Attending: INTERNAL MEDICINE

## 2023-04-19 VITALS
TEMPERATURE: 97.8 F | DIASTOLIC BLOOD PRESSURE: 60 MMHG | WEIGHT: 141 LBS | BODY MASS INDEX: 31.72 KG/M2 | HEIGHT: 56 IN | SYSTOLIC BLOOD PRESSURE: 100 MMHG | OXYGEN SATURATION: 98 % | HEART RATE: 101 BPM

## 2023-04-19 DIAGNOSIS — Z12.9 CANCER SCREENING: ICD-10-CM

## 2023-04-19 DIAGNOSIS — B18.1 CHRONIC TYPE B VIRAL HEPATITIS (CMD): Primary | ICD-10-CM

## 2023-04-19 DIAGNOSIS — B18.1 CHRONIC TYPE B VIRAL HEPATITIS (CMD): ICD-10-CM

## 2023-04-19 LAB
ALBUMIN SERPL-MCNC: 4.1 G/DL (ref 3.6–5.1)
ALBUMIN/GLOB SERPL: 1 {RATIO} (ref 1–2.4)
ALP SERPL-CCNC: 68 UNITS/L (ref 45–117)
ALT SERPL-CCNC: 49 UNITS/L
ANION GAP SERPL CALC-SCNC: 6 MMOL/L (ref 7–19)
AST SERPL-CCNC: 33 UNITS/L
BASOPHILS # BLD: 0 K/MCL (ref 0–0.3)
BASOPHILS NFR BLD: 0 %
BILIRUB SERPL-MCNC: 0.7 MG/DL (ref 0.2–1)
BUN SERPL-MCNC: 10 MG/DL (ref 6–20)
BUN/CREAT SERPL: 14 (ref 7–25)
CALCIUM SERPL-MCNC: 9 MG/DL (ref 8.4–10.2)
CHLORIDE SERPL-SCNC: 110 MMOL/L (ref 97–110)
CO2 SERPL-SCNC: 23 MMOL/L (ref 21–32)
CREAT SERPL-MCNC: 0.69 MG/DL (ref 0.51–0.95)
DEPRECATED RDW RBC: 41.1 FL (ref 39–50)
EOSINOPHIL # BLD: 0.1 K/MCL (ref 0–0.5)
EOSINOPHIL NFR BLD: 3 %
ERYTHROCYTE [DISTWIDTH] IN BLOOD: 12.6 % (ref 11–15)
FASTING DURATION TIME PATIENT: ABNORMAL H
GFR SERPLBLD BASED ON 1.73 SQ M-ARVRAT: >90 ML/MIN
GLOBULIN SER-MCNC: 4.3 G/DL (ref 2–4)
GLUCOSE SERPL-MCNC: 98 MG/DL (ref 70–99)
HBV CORE IGG+IGM SER QL: POSITIVE
HBV CORE IGM SER QL: NEGATIVE
HBV SURFACE AG SER QL: POSITIVE
HBV SURFACE AG SERPL QL CFM: POSITIVE
HCT VFR BLD CALC: 44.8 % (ref 36–46.5)
HCV AB SER QL: NEGATIVE
HGB BLD-MCNC: 15.2 G/DL (ref 12–15.5)
HIV 1+2 AB+HIV1 P24 AG SERPL QL IA: NONREACTIVE
IMM GRANULOCYTES # BLD AUTO: 0 K/MCL (ref 0–0.2)
IMM GRANULOCYTES # BLD: 0 %
INR PPP: 1
LYMPHOCYTES # BLD: 1 K/MCL (ref 1–4.8)
LYMPHOCYTES NFR BLD: 22 %
MCH RBC QN AUTO: 30.2 PG (ref 26–34)
MCHC RBC AUTO-ENTMCNC: 33.9 G/DL (ref 32–36.5)
MCV RBC AUTO: 88.9 FL (ref 78–100)
MONOCYTES # BLD: 0.3 K/MCL (ref 0.3–0.9)
MONOCYTES NFR BLD: 7 %
NEUTROPHILS # BLD: 3.1 K/MCL (ref 1.8–7.7)
NEUTROPHILS NFR BLD: 68 %
NRBC BLD MANUAL-RTO: 0 /100 WBC
PLATELET # BLD AUTO: 188 K/MCL (ref 140–450)
POTASSIUM SERPL-SCNC: 3.8 MMOL/L (ref 3.4–5.1)
PROT SERPL-MCNC: 8.4 G/DL (ref 6.4–8.2)
PROTHROMBIN TIME: 10.2 SEC (ref 9.7–11.8)
RBC # BLD: 5.04 MIL/MCL (ref 4–5.2)
SODIUM SERPL-SCNC: 135 MMOL/L (ref 135–145)
WBC # BLD: 4.6 K/MCL (ref 4.2–11)

## 2023-04-19 PROCEDURE — 99211 OFF/OP EST MAY X REQ PHY/QHP: CPT

## 2023-04-19 PROCEDURE — 82105 ALPHA-FETOPROTEIN SERUM: CPT

## 2023-04-19 PROCEDURE — 87340 HEPATITIS B SURFACE AG IA: CPT

## 2023-04-19 PROCEDURE — 87389 HIV-1 AG W/HIV-1&-2 AB AG IA: CPT

## 2023-04-19 PROCEDURE — 99203 OFFICE O/P NEW LOW 30 MIN: CPT | Performed by: INTERNAL MEDICINE

## 2023-04-19 PROCEDURE — 85025 COMPLETE CBC W/AUTO DIFF WBC: CPT

## 2023-04-19 PROCEDURE — 10004095 HB COUNTER, VISIT, NON TRANSPLANT

## 2023-04-19 PROCEDURE — 80053 COMPREHEN METABOLIC PANEL: CPT

## 2023-04-19 PROCEDURE — 86704 HEP B CORE ANTIBODY TOTAL: CPT

## 2023-04-19 PROCEDURE — 87517 HEPATITIS B DNA QUANT: CPT

## 2023-04-19 PROCEDURE — 86707 HEPATITIS BE ANTIBODY: CPT

## 2023-04-19 PROCEDURE — 36415 COLL VENOUS BLD VENIPUNCTURE: CPT

## 2023-04-19 PROCEDURE — 87341 HEP B SURFACE AG NEUTRLZJ IA: CPT

## 2023-04-19 PROCEDURE — 86705 HEP B CORE ANTIBODY IGM: CPT

## 2023-04-19 PROCEDURE — 87350 HEPATITIS BE AG IA: CPT

## 2023-04-19 PROCEDURE — 86803 HEPATITIS C AB TEST: CPT

## 2023-04-19 PROCEDURE — 85610 PROTHROMBIN TIME: CPT

## 2023-04-20 LAB
AFP-TM SERPL-MCNC: <3 NG/ML
HBV DNA SERPL NAA+PROBE-ACNC: 260 IU/ML
HBV DNA SERPL NAA+PROBE-LOG IU: 2.42 LOG
SERVICE CMNT-IMP: ABNORMAL

## 2023-04-21 ENCOUNTER — HOSPITAL ENCOUNTER (OUTPATIENT)
Dept: ULTRASOUND IMAGING | Age: 28
Discharge: HOME | End: 2023-04-21
Attending: INTERNAL MEDICINE

## 2023-04-21 DIAGNOSIS — Z12.9 CANCER SCREENING: ICD-10-CM

## 2023-04-21 DIAGNOSIS — B18.1 CHRONIC TYPE B VIRAL HEPATITIS (CMD): ICD-10-CM

## 2023-04-21 LAB
HBV E AB SERPL QL IA: POSITIVE
HBV E AG SERPL QL IA: NEGATIVE

## 2023-04-21 PROCEDURE — 76705 ECHO EXAM OF ABDOMEN: CPT

## 2023-04-21 PROCEDURE — 93975 VASCULAR STUDY: CPT

## 2023-04-21 PROCEDURE — 93975 VASCULAR STUDY: CPT | Performed by: RADIOLOGY

## 2023-04-28 ENCOUNTER — TELEPHONE (OUTPATIENT)
Dept: TRANSPLANT | Age: 28
End: 2023-04-28

## 2023-04-28 DIAGNOSIS — B18.1 CHRONIC TYPE B VIRAL HEPATITIS (CMD): Primary | ICD-10-CM

## 2023-10-02 ENCOUNTER — OFFICE VISIT (OUTPATIENT)
Dept: FAMILY MEDICINE | Facility: CLINIC | Age: 28
End: 2023-10-02
Payer: COMMERCIAL

## 2023-10-02 VITALS
TEMPERATURE: 98.3 F | DIASTOLIC BLOOD PRESSURE: 71 MMHG | WEIGHT: 150.25 LBS | SYSTOLIC BLOOD PRESSURE: 115 MMHG | BODY MASS INDEX: 31.54 KG/M2 | HEART RATE: 77 BPM | HEIGHT: 58 IN | OXYGEN SATURATION: 97 % | RESPIRATION RATE: 12 BRPM

## 2023-10-02 DIAGNOSIS — M54.42 ACUTE LEFT-SIDED LOW BACK PAIN WITH LEFT-SIDED SCIATICA: Primary | ICD-10-CM

## 2023-10-02 PROBLEM — Z86.39 HISTORY OF HYPOKALEMIA: Status: RESOLVED | Noted: 2019-10-25 | Resolved: 2023-10-02

## 2023-10-02 PROCEDURE — 90471 IMMUNIZATION ADMIN: CPT | Performed by: FAMILY MEDICINE

## 2023-10-02 PROCEDURE — 99213 OFFICE O/P EST LOW 20 MIN: CPT | Mod: 25 | Performed by: FAMILY MEDICINE

## 2023-10-02 PROCEDURE — 90686 IIV4 VACC NO PRSV 0.5 ML IM: CPT | Performed by: FAMILY MEDICINE

## 2023-10-02 RX ORDER — NAPROXEN 500 MG/1
500 TABLET ORAL 2 TIMES DAILY WITH MEALS
Qty: 30 TABLET | Refills: 0 | Status: SHIPPED | OUTPATIENT
Start: 2023-10-02 | End: 2024-01-05

## 2023-10-02 ASSESSMENT — ENCOUNTER SYMPTOMS
LEG PAIN: 1
BACK PAIN: 1

## 2023-10-02 NOTE — PROGRESS NOTES
"  Colten was seen today for leg pain and back pain.    Diagnoses and all orders for this visit:    Acute left-sided low back pain with left-sided sciatica: History and exam consistent with SI joint/sciatica. Plan for NSAIDS for 10 days, then use prn. Use heat/ice. Referred to PT and also gave patient a few exercises to try at home.   -     naproxen (NAPROSYN) 500 MG tablet; Take 1 tablet (500 mg) by mouth 2 times daily (with meals)  -     Physical Therapy Referral; Future    Other orders  -     INFLUENZA VACCINE IM > 6 MONTHS VALENT IIV4 (AFLURIA/FLUZONE)        Subjective   Colten is a 28 year old, presenting for the following health issues:  Leg Pain and Back Pain      10/2/2023    10:30 AM   Additional Questions   Roomed by Pearl MARCUM       History of Present Illness       Reason for visit:  Leg and back pain  Symptom onset:  More than a month      No injury  Worse in morning   L buttocks, radiates down into foot  Numbness/tingling  Difficulty sitting with legs crossed  Difficult standing up after sitting long periods of time  Worries she will be paralyzed  Has never had this previously  Denies any bowel or bladder incontinence.      Review of Systems   Musculoskeletal:  Positive for back pain.            Objective    /71   Pulse 77   Temp 98.3  F (36.8  C) (Oral)   Resp 12   Ht 1.48 m (4' 10.27\")   Wt 68.2 kg (150 lb 4 oz)   LMP  (LMP Unknown)   SpO2 97%   Breastfeeding Yes   BMI 31.11 kg/m    Body mass index is 31.11 kg/m .  Physical Exam   MSK: normal gait, normal strength 5/5 in lower extremity, tenderness over SI joint left, positive straight leg raise.   Neuro: normal reflexes        "

## 2023-10-02 NOTE — PATIENT INSTRUCTIONS
Patient Education   Sciatica: Exercises  Introduction  Here are some examples of typical rehabilitation exercises for your condition. Start each exercise slowly. Ease off the exercise if you start to have pain.  Your doctor or physical therapist will tell you when you can start these exercises and which ones will work best for you.  When you are not being active, find a comfortable position for rest. Some people are comfortable on the floor or a medium-firm bed with a small pillow under their head and another under their knees. Some people prefer to lie on their side with a pillow between their knees. Don't stay in one position for too long.  Take short walks (10 to 20 minutes) every 2 to 3 hours. Avoid slopes, hills, and stairs until you feel better. Walk only distances you can manage without pain, especially leg pain.  How to do the exercises  Cat-cow    Get on your hands and knees. Your shoulders should be directly above your wrists, and your hips should be above your knees. Your back should be flat, and your neck should extend out straight from your spine. Your gaze should be toward the floor below.  Relax your head and allow it to droop. Round your back up toward the ceiling until you feel a nice stretch in your upper, middle, and lower back. Hold this stretch for as long as it feels comfortable, or about 15 to 30 seconds.  Then let your back curve down by pressing your stomach toward the floor. Lift your buttocks toward the ceiling. If it doesn't bother your neck, you can raise your head as you allow your back to sway. Hold this position for 15 to 30 seconds.  Go back and forth smoothly 2 to 4 times between the rounded back and swayed back positions.  Follow-up care is a key part of your treatment and safety. Be sure to make and go to all appointments, and call your doctor if you are having problems. It's also a good idea to know your test results and keep a list of the medicines you take.  Current as of:  November 9, 2022               Content Version: 13.7    6943-7639 [employer].   Care instructions adapted under license by your healthcare professional. If you have questions about a medical condition or this instruction, always ask your healthcare professional. [employer] disclaims any warranty or liability for your use of this information.

## 2023-10-31 ENCOUNTER — THERAPY VISIT (OUTPATIENT)
Dept: PHYSICAL THERAPY | Facility: REHABILITATION | Age: 28
End: 2023-10-31
Attending: FAMILY MEDICINE
Payer: COMMERCIAL

## 2023-10-31 DIAGNOSIS — M54.42 ACUTE LEFT-SIDED LOW BACK PAIN WITH LEFT-SIDED SCIATICA: Primary | ICD-10-CM

## 2023-10-31 PROCEDURE — 97161 PT EVAL LOW COMPLEX 20 MIN: CPT | Mod: GP | Performed by: PHYSICAL THERAPIST

## 2023-10-31 NOTE — PROGRESS NOTES
"PHYSICAL THERAPY EVALUATION  Type of Visit: Evaluation    See electronic medical record for Abuse and Falls Screening details.    Subjective       Presenting condition or subjective complaint:    Date of onset: 10/02/23    Relevant medical history:     Dates & types of surgery:      Prior diagnostic imaging/testing results:       Prior therapy history for the same diagnosis, illness or injury:        Prior Level of Function  Independent with ADLs and caring for her child    Additional subjective: Pt is a 28 year old female with c/o pain starting in L glute and going into groin and down to knee. At times her pain radiates all the way down to her foot. Standing for long periods of time or standing up after sitting are aggravating to her symptoms. She has not found anything to relieve her pain, but keeping it in knee extension feels a little better when she is sitting. Pt reports that she has not had any numbness or tingling into the leg, but it feels weak when she has pain. She describes pain as sharp if she is moving or twisting (6/10 VPRS) but is just a dull pain if resting. Pt states that she has felt stiff in the mornings and that her fingertips feel numb at times. She reports that there was nothing that caused this pain to start (No specific LYLE) and that she thinks she must have \"twisted something\". She says that there is nothing that she isn't doing because of the pain because she feels that she has to push through it. Her goals with PT are to manage the pain with daily activities and caring for her child.    Living Environment  Social support:     Type of home:     Stairs to enter the home:         Ramp:     Stairs inside the home:         Help at home:    Equipment owned:       Employment:      Hobbies/Interests:      Patient goals for therapy:      Pain assessment: Location: in L glute, down through L foot at times/Ratin/10     Objective   LUMBAR SPINE EVALUATION  PAIN: Pain Level at Rest: 4/10  Pain " Level with Use: 6/10  Pain Location: hip, knee, ankle, foot , and pain starts in L hip and radiates down to foot at times  Pain Quality: Dull and Sharp  Pain Frequency: intermittent  Pain is Worst: daytime or feels stiff in morning - sharp pain happens intermittently throughout day  Pain is Exacerbated By: bending the knee, standing for long time, standing up from a chair  Pain is Relieved By: hasn't found anything to help  Pain Progression: Unchanged  GAIT:   Gait Deviations: WNL  ROM:   (Degrees) Left AROM Left PROM  Right AROM Right PROM   Hip Flexion WNL WNL WNL WNL   Hip Internal Rotation  WNL  WNL   Hip External Rotation  WNL - some pain  WNL   Lumbar Flexion WNL - hypermobile   Lumbar Extension Increased pain but pt does not believe this is related to her chief complaint   Pain: pain with KAYLEEN position in anterolateral L hip    STRENGTH: ABD/Adduction screen 5/5 in seated  MYOTOMES:    Left Right   T12-L3 (Hip Flexion) 4 5   L2-4 (Quads)  5 5   L4 (Ankle DF) 5 5   L5 (Great Toe Ext) 5 5   S1 (Toe Raise) 5 5     DTR S:    Left Right   L4 (Quad) 1 0   S1 (Achilles) 0 0     LUMBAR/HIP Special Tests:    Left Right   KAYLEEN Positive - pain in anterolateral hip Negative    Piriformis Positive Negative    SLR Negative  Negative       PELVIS/SI SPECIAL TESTS:    Additional Notes   Pelvic Compression Negative   Pelvic Gapping Negative   Sacral Thrust Negative   Thigh Thrust Negative     PALPATION: WNL  SPINAL SEGMENTAL CONCLUSIONS: WNL      Assessment & Plan   CLINICAL IMPRESSIONS  Medical Diagnosis: M54.42 (ICD-10-CM) - Acute left-sided low back pain with left-sided sciatica    Treatment Diagnosis: L LE pain with reduced weight bearing tolerance   Impression/Assessment: Patient is a 28 year old female with c/o pain in L glute that radiates down to knee, and foot at times.  The following significant findings have been identified: Pain, Decreased activity tolerance, and decreased weightbearing tolerance . These  impairments interfere with their ability to perform self care tasks, work tasks, recreational activities, household chores, and community mobility as compared to previous level of function. Pt is appropriate for 1:1 skilled physical therapy to improve weight bearing tolerance for improved ability to for work/recreational/home tasks.    Clinical Decision Making (Complexity):  Clinical Presentation: Stable/Uncomplicated  Clinical Presentation Rationale: based on medical and personal factors listed in PT evaluation  Clinical Decision Making (Complexity): Low complexity    PLAN OF CARE  Treatment Interventions:  Interventions: Manual Therapy, Therapeutic Activity, Therapeutic Exercise, Self-Care/Home Management    Long Term Goals     PT Goal 1  Goal Description: Pt will be >50% compliant with HEP for improved pain manage and improved ability to tolerate standing/  Target Date: 01/23/24  PT Goal 2  Goal Description: Pt will be able to tolerate 45 min of continuous standing in 12 weeks in order to improve participation in home and self cares  Target Date: 01/23/24  PT Goal 3  Goal Description: Pt will achieve age/gender norms for 30 sec STS in 12 weeks  Goal Progress: w+12      Frequency of Treatment: 1x/week  Duration of Treatment: 12 weeks    Recommended Referrals to Other Professionals:  none  Education Assessment:   Learner/Method: Patient  Education Comments: POC and HEP    Risks and benefits of evaluation/treatment have been explained.   Patient/Family/caregiver agrees with Plan of Care.     Evaluation Time:     PT Eval, Low Complexity Minutes (75800): 30       Signing Clinician: James García, PT      [facility]                                                                                   OUTPATIENT PHYSICAL THERAPY      PLAN OF TREATMENT FOR OUTPATIENT REHABILITATION   Patient's Last Name, First Name, Colten Russ    YOB: 1995   Provider's Name   [facility]   Medical Record No.  [id number]     Onset Date: 10/02/23  Start of Care Date: 10/31/23     Medical Diagnosis:  M54.42 (ICD-10-CM) - Acute left-sided low back pain with left-sided sciatica      PT Treatment Diagnosis:  L LE pain with reduced weight bearing tolerance Plan of Treatment  Frequency/Duration: 1x/week/ 12 weeks    Certification date from 10/31/23 to 01/23/24         See note for plan of treatment details and functional goals     James García, PT                         I CERTIFY THE NEED FOR THESE SERVICES FURNISHED UNDER        THIS PLAN OF TREATMENT AND WHILE UNDER MY CARE     (Physician attestation of this document indicates review and certification of the therapy plan).                Referring Provider:  Flor Stoddard      Initial Assessment  See Epic Evaluation- Start of Care Date: 10/31/23

## 2023-12-01 ENCOUNTER — THERAPY VISIT (OUTPATIENT)
Dept: PHYSICAL THERAPY | Facility: REHABILITATION | Age: 28
End: 2023-12-01
Payer: COMMERCIAL

## 2023-12-01 DIAGNOSIS — M54.42 ACUTE LEFT-SIDED LOW BACK PAIN WITH LEFT-SIDED SCIATICA: Primary | ICD-10-CM

## 2023-12-01 PROCEDURE — 97110 THERAPEUTIC EXERCISES: CPT | Mod: GP | Performed by: PHYSICAL THERAPIST

## 2023-12-18 ENCOUNTER — PATIENT OUTREACH (OUTPATIENT)
Dept: CARE COORDINATION | Facility: CLINIC | Age: 28
End: 2023-12-18
Payer: COMMERCIAL

## 2023-12-18 NOTE — PROGRESS NOTES
Clinic Care Coordination Contact  Program:  [facility]: [place]   Renewal: [facility]   Date Applied:     BRANDON Outreach:   12/18/23: CTA called to see if patient needed assistance with their [employer] Renewal. Patient declined needing assistance and no follow up needed   Candi Emanuel  Care   [facility]  Clinic Care Coordination  [phone]      Health Insurance:      Referral/Screening:

## 2023-12-29 ENCOUNTER — TELEPHONE (OUTPATIENT)
Dept: FAMILY MEDICINE | Facility: CLINIC | Age: 28
End: 2023-12-29
Payer: COMMERCIAL

## 2023-12-29 NOTE — TELEPHONE ENCOUNTER
During WCC for her daughter.    Unknown LMP due to previously breastfeeding her daughter. Positive UPT at home- 12/17/23.  Had slight spotting on 12/1/23.     Planned pregnancy. Has appointment 1/26/24 but wants to be seen sooner.    Will have her see me for pregnancy confirmation next week due to patient being concernrd about miscarriage (history).

## 2024-01-05 ENCOUNTER — OFFICE VISIT (OUTPATIENT)
Dept: FAMILY MEDICINE | Facility: CLINIC | Age: 29
End: 2024-01-05
Payer: COMMERCIAL

## 2024-01-05 VITALS
DIASTOLIC BLOOD PRESSURE: 76 MMHG | TEMPERATURE: 98.5 F | RESPIRATION RATE: 16 BRPM | OXYGEN SATURATION: 100 % | WEIGHT: 152.6 LBS | BODY MASS INDEX: 32.03 KG/M2 | SYSTOLIC BLOOD PRESSURE: 113 MMHG | HEART RATE: 66 BPM | HEIGHT: 58 IN

## 2024-01-05 DIAGNOSIS — Z34.90 PREGNANCY, UNSPECIFIED GESTATIONAL AGE: Primary | ICD-10-CM

## 2024-01-05 PROBLEM — Z86.19 HISTORY OF CHLAMYDIA: Status: RESOLVED | Noted: 2019-01-04 | Resolved: 2024-01-05

## 2024-01-05 LAB — HCG UR QL: POSITIVE

## 2024-01-05 PROCEDURE — 81025 URINE PREGNANCY TEST: CPT | Performed by: FAMILY MEDICINE

## 2024-01-05 PROCEDURE — 99213 OFFICE O/P EST LOW 20 MIN: CPT | Performed by: FAMILY MEDICINE

## 2024-01-05 RX ORDER — PNV NO.95/FERROUS FUM/FOLIC AC 28MG-0.8MG
1 TABLET ORAL DAILY
Qty: 90 TABLET | Refills: 3 | Status: SHIPPED | OUTPATIENT
Start: 2024-01-05

## 2024-01-05 NOTE — PROGRESS NOTES
Assessment/Plan:     Colten Meade is a 29 year old  here for confirmation of pregnancy.    Colten was seen today for confirmation of pregnancy.    Diagnoses and all orders for this visit:    Pregnancy, unspecified gestational age: Unsure LMP due to breastfeeding- had negative UPT in November and positive in December- likely early in first trimester. Too early for dating/viability US- will schedule this in 2 weeks. Start PNV. Reviewed medication safety in pregnancy.   -     HCG qualitative urine; Future  -     HCG qualitative urine  -     Prenatal Vit-Fe Fumarate-FA (PRENATAL VITAMIN) 27-0.8 MG TABS; Take 1 tablet by mouth daily  -     US OB < 14 Weeks Single; Future    Of note, patient followed with me for one of her pregnancies and I see her children. She followed with [facility] for her last pregnancy- I did review all options with her today- she does prefer to have care here at [facility] for this pregnancy.    Medications were reviewed for safety in pregnancy.  Risk factors identified today: history of miscarriage, history of shoulder dystocia last delivery/fetal macrosomia    Will send message to our RN, [place], to schedule IOB RN intake visit- and she already has a follow-up appointment with me, which will be switched to her next OB appointment.      Subjective:     Colten Meade is a 29 year old female who presents for evaluation of amenorrhea  Unknown LMP due to breastfeeding. Had slight spotting on 23.  Feeling some nausea in November- was negative UPT  December- had nausea and positive UPT at home-   Current contraception:  none   Pregnancy is desired.   Current symptoms also include: positive home pregnancy test    Vaginal itching has resolved    Risk behaviors:    Tobacco use:  reports that she has never smoked. She has never been exposed to tobacco smoke. She has never used smokeless tobacco.  Drug or alcohol use: no    No current outpatient medications on file.    OB History    Para  "Term  AB Living   7 4 3 1 2 3   SAB IAB Ectopic Multiple Live Births   2 0 0 0 3      # Outcome Date GA Lbr Dmitry/2nd Weight Sex Delivery Anes PTL Lv   7 Term 09/15/22 41w1d 04:43 / 00:07 4.13 kg (9 lb 1.7 oz) F Vag-Spont None  TORITO      Complications: Fetal Intolerance, Shoulder Dystocia      Name: NIA,FEMALE-NOVA      Apgar1: 7  Apgar5: 9   6 SAB 21     SAB         Birth Comments: s/p D&C   5 SAB 21 9w0d    SAB      4 Term 19 40w6d 06:20 / 02:21 3.655 kg (8 lb 0.9 oz) F Vag-Spont Local N TORITO      Name: SHARON KRAMER-NOVA      Apgar1: 8  Apgar5: 9   3 Term 10/10/12 40w0d  2.9 kg (6 lb 6.3 oz) F Vag-Spont None  TORITO      Birth Comments: in [facility]   2   18w0d   M    FD      Complications: Other Excessive Bleeding   1                   Objective:     /76   Pulse 66   Temp 98.5  F (36.9  C) (Oral)   Resp 16   Ht 1.485 m (4' 10.47\")   Wt 69.2 kg (152 lb 9.6 oz)   LMP 2023 (Exact Date)   SpO2 100%   Breastfeeding No   BMI 31.39 kg/m    Gen:  A&A, NAD.      Lab Review  Results for orders placed or performed in visit on 24   HCG qualitative urine   Result Value Ref Range    hCG Urine Qualitative Positive (A) Negative          "

## 2024-01-08 ENCOUNTER — TELEPHONE (OUTPATIENT)
Dept: FAMILY MEDICINE | Facility: CLINIC | Age: 29
End: 2024-01-08
Payer: COMMERCIAL

## 2024-01-19 ENCOUNTER — HOSPITAL ENCOUNTER (OUTPATIENT)
Dept: ULTRASOUND IMAGING | Facility: HOSPITAL | Age: 29
Discharge: HOME | End: 2024-01-19
Attending: FAMILY MEDICINE | Admitting: FAMILY MEDICINE
Payer: COMMERCIAL

## 2024-01-19 DIAGNOSIS — Z34.90 PREGNANCY, UNSPECIFIED GESTATIONAL AGE: ICD-10-CM

## 2024-01-19 PROCEDURE — 76801 OB US < 14 WKS SINGLE FETUS: CPT

## 2024-01-22 ENCOUNTER — VIRTUAL VISIT (OUTPATIENT)
Dept: FAMILY MEDICINE | Facility: CLINIC | Age: 29
End: 2024-01-22
Payer: COMMERCIAL

## 2024-01-22 DIAGNOSIS — R39.15 URGENCY OF URINATION: ICD-10-CM

## 2024-01-22 DIAGNOSIS — Z11.3 SCREEN FOR STD (SEXUALLY TRANSMITTED DISEASE): ICD-10-CM

## 2024-01-22 DIAGNOSIS — O09.40 ENCOUNTER FOR SUPERVISION OF HIGH RISK PREGNANCY WITH GRAND MULTIPARITY, ANTEPARTUM: ICD-10-CM

## 2024-01-22 DIAGNOSIS — O09.299 HISTORY OF SPONTANEOUS ABORTION, CURRENTLY PREGNANT: Primary | ICD-10-CM

## 2024-01-22 NOTE — PROGRESS NOTES
SUBJECTIVE:     HPI:    This is a 29 year old female patient,  who presents for her first telephone vitrual visit.    MELODY: 2024 based on US OB <14 weeks dated 2024.   She 11w5d.  Her cycles are regular.  Her last menstrual period was abnormal since her last miscarried.   Since her LMP to this pregnancy, patient reported nausea/vomiting, urgency/frequency with urination and fatigue. She denies abdominal pain, headache, loss of appetite, vaginal discharge, pelvic pain, lightheadedness, urinary frequency, vaginal bleeding, and constipation.    Additional History:  with three miscarried and three girls living. Hx Left-sided low back pain with left-sided sciatica. Family hx of diabetes and hypertension. Patient reported her period was irregular since last miscarriage.  LMP approximately of 2023 with MELODY: 24.  Reported  currently in [facility] and due to return the end of . OB provider established with Dr. Stoddard    Have you travelled during the pregnancy?No  Have your sexual partner(s) travelled during the pregnancy?Yes, If yes, where?  currently in [facility] and will return back to the [place] the end of .      Marital Status: Culturally   Occupation: Not currently working/schooling  Living in Household: Spouse and Children  No pets    Past History:  Her past medical history is non-contributory.      She has a history of   Three NVSD while three miscarried.     Since her last LMP she denies use of alcohol, tobacco and street drugs.    Past medical, surgical, social and family history were reviewed and updated in [facility].      Current Outpatient Medications   Medication    Prenatal Vit-Fe Fumarate-FA (PRENATAL VITAMIN) 27-0.8 MG TABS     No current facility-administered medications for this visit.        OBJECTIVE:     EXAM:  LMP 2023 (Approximate)  There is no height or weight on file to calculate BMI.  Telephone virtual appointment.   No vital signs obtained.        ASSESSMENT/PLAN:      29 year old ,  11 weeks 5 day of pregnancy with MELODY: 2024 based on US OB<14 weeks .    Discussed as follows:     New Prenatal Education  during nurse intake    Medications- Prenatal Vitamin, recommend one with DHA as this helps with development of eyes and brain, no specific brand recommendation   Water Intake-  ounces daily   Weight- monitored at each appointment   Common Symptoms- may experience shortness of breath, increased heart rate, nausea/vomiting, headaches, cramping, spotting, etc.  If symptoms not improved and or worsening, contact provider.        - Only take Tylenol (acetaminophen) for headaches/pain concerns   - Review remedies & OTC medication to help with common symptoms in pregnancy (see taking medication during pregnancy handout)     [place]- baby is protected, not uncommon to have light cramping or spotting, reach out if persisting or worsening    Diet   - Wash fruits and vegetables before eating raw or cooking   - Avoid unpasteurized products   - Avoid undercooked meat, poultry, fish (no raw fish) and eggs    - Reheat hot dogs and luncheon meats/cold cuts prior to consumption     Caffeine- limit to 200 mg/day (about 1.5 cups of coffee)   Abstain from smoking, alcohol, and drugs      Travel     - No travel 4-6 weeks out from due date   - Planes/lengthy drives- get up and walk every 1-2 hours for circulation, increased risk for DVT during pregnancy   - Seat belts- wear underneath belly not across it   - Air bags- back up seat      Disease and Infection     Risk of toxoplasmosis with cats  feces, have someone else change litter box during pregnancy, wear gloves when working outside and wash hands thoroughly   Avoid traveling to locations high risk for zika, use insect repellent, wear long sleeves and pants   Flu vaccine during flu season, COVID vaccine and TDAP   TDAP recommended with each pregnancy, make sure partner is up  to date as well (usually only once every 10 years)      Frequency of Appointments- unless advised otherwise   Every 4 weeks until 28 weeks   Every 2 weeks until 36 weeks   Weekly until delivery     Counseling given:   - Follow up in 4 days with OB provider. Follow up provider thereafter is required.    Labs ordered during this visit  - Hepatitis C Antibody   - Hemoglobin A1C  - Lead Venous Blood  - Urine Culture  - Treponema Abs W Reflex TO RPR and Titer  - Rubella Antibody IGG  - HIV Antigen Antibody Combo  - CBC with Platelets  - Hepatitis B Surface Antigen  - ABO/RH Type and Scsreen  - UA/UC ordered due to urgency/frequency urination.      Prenatal OB Questionnaire    Patient supplied answers from flow sheet for:  Prenatal OB Questionnaire.  Past Medical History  Have you ever recieved care for your mental health? : No  Have you ever been in a major accident or suffered serious trauma?: No  Within the last year, has anyone hit, slapped, kicked or otherwise hurt you?: No  In the last year, has anyone forced you to have sex when you didn't want to?: No    Past Medical History 2   Have you ever received a blood transfusion?: No  Would you accept a blood transfusion if was medically recommended?: Yes  Does anyone in your home smoke?: No   Is your blood type Rh negative?: No  Have you ever ?: (!) Yes  Have you been hospitalized for a nonsurgical reason excluding normal delivery?: No  Have you ever had an abnormal pap smear?: Unknown    Past Medical History (Continued)  Do you have a history of abnormalities of the uterus?: No  Did your mother take DAVID or any other hormones when she was pregnant with you?: No  Do you have any other problems we have not asked about which you feel may be important to this pregnancy?: No     Allergies as of 1/22/2024:    Allergies as of 01/22/2024 - Reviewed 01/22/2024   Allergen Reaction Noted    Depo-provera [medroxyprogesterone] Dizziness 09/20/2018       Current medications  are:  Current Outpatient Medications   Medication Sig Dispense Refill    Prenatal Vit-Fe Fumarate-FA (PRENATAL VITAMIN) 27-0.8 MG TABS Take 1 tablet by mouth daily 90 tablet 3       Early ultrasound screening tool:    Does patient have irregular periods?  Yes  Did patient use hormonal birth control in the three months prior to positive urine pregnancy test? N/A  Is the patient breastfeeding?  No  Is the patient 10 weeks or greater at time of education visit?  Yes

## 2024-01-25 LAB
ABO/RH(D): NORMAL
ANTIBODY SCREEN: NEGATIVE
SPECIMEN EXPIRATION DATE: NORMAL

## 2024-01-26 ENCOUNTER — PRENATAL OFFICE VISIT (OUTPATIENT)
Dept: FAMILY MEDICINE | Facility: CLINIC | Age: 29
End: 2024-01-26
Payer: COMMERCIAL

## 2024-01-26 VITALS
HEIGHT: 58 IN | OXYGEN SATURATION: 100 % | SYSTOLIC BLOOD PRESSURE: 113 MMHG | RESPIRATION RATE: 20 BRPM | HEART RATE: 72 BPM | TEMPERATURE: 97.9 F | WEIGHT: 149.7 LBS | BODY MASS INDEX: 31.42 KG/M2 | DIASTOLIC BLOOD PRESSURE: 75 MMHG

## 2024-01-26 DIAGNOSIS — N89.8 VAGINAL DISCHARGE: ICD-10-CM

## 2024-01-26 DIAGNOSIS — O09.299 HISTORY OF SPONTANEOUS ABORTION, CURRENTLY PREGNANT: ICD-10-CM

## 2024-01-26 DIAGNOSIS — Z87.59 HISTORY OF SHOULDER DYSTOCIA IN PRIOR PREGNANCY: ICD-10-CM

## 2024-01-26 DIAGNOSIS — Z11.3 SCREEN FOR STD (SEXUALLY TRANSMITTED DISEASE): ICD-10-CM

## 2024-01-26 DIAGNOSIS — O09.40 ENCOUNTER FOR SUPERVISION OF HIGH RISK PREGNANCY WITH GRAND MULTIPARITY, ANTEPARTUM: ICD-10-CM

## 2024-01-26 DIAGNOSIS — R39.15 URGENCY OF URINATION: ICD-10-CM

## 2024-01-26 DIAGNOSIS — Z34.90 PREGNANCY, UNSPECIFIED GESTATIONAL AGE: Primary | ICD-10-CM

## 2024-01-26 LAB
ALBUMIN UR-MCNC: ABNORMAL MG/DL
APPEARANCE UR: CLEAR
BACTERIA #/AREA URNS HPF: ABNORMAL /HPF
BILIRUB UR QL STRIP: NEGATIVE
CLUE CELLS: ABNORMAL
COLOR UR AUTO: YELLOW
ERYTHROCYTE [DISTWIDTH] IN BLOOD BY AUTOMATED COUNT: 12.3 % (ref 10–15)
GLUCOSE UR STRIP-MCNC: NEGATIVE MG/DL
HBA1C MFR BLD: 5.4 % (ref 0–5.6)
HBV SURFACE AG SERPL QL IA: NONREACTIVE
HCT VFR BLD AUTO: 36.7 % (ref 35–47)
HCV AB SERPL QL IA: NONREACTIVE
HGB BLD-MCNC: 12.4 G/DL (ref 11.7–15.7)
HGB UR QL STRIP: NEGATIVE
HIV 1+2 AB+HIV1 P24 AG SERPL QL IA: NONREACTIVE
KETONES UR STRIP-MCNC: ABNORMAL MG/DL
LEUKOCYTE ESTERASE UR QL STRIP: ABNORMAL
MCH RBC QN AUTO: 31.1 PG (ref 26.5–33)
MCHC RBC AUTO-ENTMCNC: 33.8 G/DL (ref 31.5–36.5)
MCV RBC AUTO: 92 FL (ref 78–100)
MUCOUS THREADS #/AREA URNS LPF: PRESENT /LPF
NITRATE UR QL: NEGATIVE
PH UR STRIP: 6 [PH] (ref 5–7)
PLATELET # BLD AUTO: 215 10E3/UL (ref 150–450)
RBC # BLD AUTO: 3.99 10E6/UL (ref 3.8–5.2)
RBC #/AREA URNS AUTO: ABNORMAL /HPF
RUBV IGG SERPL QL IA: 9.11 INDEX
RUBV IGG SERPL QL IA: POSITIVE
SP GR UR STRIP: >=1.03 (ref 1–1.03)
SQUAMOUS #/AREA URNS AUTO: ABNORMAL /LPF
T PALLIDUM AB SER QL: NONREACTIVE
TRICHOMONAS, WET PREP: ABNORMAL
UROBILINOGEN UR STRIP-ACNC: 0.2 E.U./DL
WBC # BLD AUTO: 7.5 10E3/UL (ref 4–11)
WBC #/AREA URNS AUTO: ABNORMAL /HPF
WBC'S/HIGH POWER FIELD, WET PREP: ABNORMAL
YEAST, WET PREP: PRESENT

## 2024-01-26 PROCEDURE — 83036 HEMOGLOBIN GLYCOSYLATED A1C: CPT | Performed by: FAMILY MEDICINE

## 2024-01-26 PROCEDURE — 87491 CHLMYD TRACH DNA AMP PROBE: CPT | Performed by: FAMILY MEDICINE

## 2024-01-26 PROCEDURE — 99000 SPECIMEN HANDLING OFFICE-LAB: CPT | Performed by: FAMILY MEDICINE

## 2024-01-26 PROCEDURE — 87591 N.GONORRHOEAE DNA AMP PROB: CPT | Performed by: FAMILY MEDICINE

## 2024-01-26 PROCEDURE — 87088 URINE BACTERIA CULTURE: CPT | Performed by: FAMILY MEDICINE

## 2024-01-26 PROCEDURE — 86850 RBC ANTIBODY SCREEN: CPT | Performed by: FAMILY MEDICINE

## 2024-01-26 PROCEDURE — 86780 TREPONEMA PALLIDUM: CPT | Performed by: FAMILY MEDICINE

## 2024-01-26 PROCEDURE — 87389 HIV-1 AG W/HIV-1&-2 AB AG IA: CPT | Performed by: FAMILY MEDICINE

## 2024-01-26 PROCEDURE — 86900 BLOOD TYPING SEROLOGIC ABO: CPT | Performed by: FAMILY MEDICINE

## 2024-01-26 PROCEDURE — 86803 HEPATITIS C AB TEST: CPT | Performed by: FAMILY MEDICINE

## 2024-01-26 PROCEDURE — 83655 ASSAY OF LEAD: CPT | Mod: 90 | Performed by: FAMILY MEDICINE

## 2024-01-26 PROCEDURE — 87210 SMEAR WET MOUNT SALINE/INK: CPT | Performed by: FAMILY MEDICINE

## 2024-01-26 PROCEDURE — 85027 COMPLETE CBC AUTOMATED: CPT | Performed by: FAMILY MEDICINE

## 2024-01-26 PROCEDURE — 87340 HEPATITIS B SURFACE AG IA: CPT | Performed by: FAMILY MEDICINE

## 2024-01-26 PROCEDURE — 99207 PR PRENATAL VISIT: CPT | Performed by: FAMILY MEDICINE

## 2024-01-26 PROCEDURE — 36415 COLL VENOUS BLD VENIPUNCTURE: CPT | Performed by: FAMILY MEDICINE

## 2024-01-26 PROCEDURE — 87086 URINE CULTURE/COLONY COUNT: CPT | Performed by: FAMILY MEDICINE

## 2024-01-26 PROCEDURE — 81001 URINALYSIS AUTO W/SCOPE: CPT | Performed by: FAMILY MEDICINE

## 2024-01-26 PROCEDURE — 86762 RUBELLA ANTIBODY: CPT | Performed by: FAMILY MEDICINE

## 2024-01-26 PROCEDURE — 86901 BLOOD TYPING SEROLOGIC RH(D): CPT | Performed by: FAMILY MEDICINE

## 2024-01-26 RX ORDER — MICONAZOLE NITRATE 4 %
1 CREAM WITH PREFILLED APPLICATOR VAGINAL AT BEDTIME
Qty: 25 G | Refills: 0 | Status: SHIPPED | OUTPATIENT
Start: 2024-01-26 | End: 2024-02-23

## 2024-01-26 NOTE — PROGRESS NOTES
"SUBJECTIVE:   at 12w2d by 11 week US. Estimated Date of Delivery: Aug 7, 2024.  She is doing well. She reports nausea but improving and does not want medication for this. She denies abdominal pain/cramping, vaginal bleeding, leakage of fluid. Fetal movement is present.   Concerns: +dysuria, +vaginal odor and discharge.   ROS  Negative except as noted above in HPI.  OBJECTIVE:  Blood pressure 113/75, pulse 72, temperature 97.9  F (36.6  C), temperature source Oral, resp. rate 20, height 1.485 m (4' 10.47\"), weight 67.9 kg (149 lb 11.2 oz), last menstrual period 2023, SpO2 100%, not currently breastfeeding.  Gen: comfortable, no acute distress.  See OB Vitals flowsheet.  Bedside US: normal cardiac activity    ASSESSMENT/PLAN:  Colten was seen today for prenatal care.    Diagnoses and all orders for this visit:    Pregnancy, unspecified gestational age  -     Hemoglobin A1c; Future  -     Non Invasive Prenatal Test Cell Free DNA; Future  -     Hemoglobin A1c  -     Non Invasive Prenatal Test Cell Free DNA    History of shoulder dystocia in prior pregnancy: Reviewed importance of healthy eating and activity to prevent fetal macrosomia and recurrence of shoulder dystocia. Will likely benefit from a shoulder dystocia consult from OB/GYN later in pregnancy. Will also consider growth US.  -     Hemoglobin A1c; Future  -     Hemoglobin A1c    Vaginal discharge: Check swabs.  -     Wet prep - Clinic Collect  -     miconazole nitrate (MONISTAT 3) 4 % cream; Place 1 applicator vaginally at bedtime    Screen for STD (sexually transmitted disease): Asymptomatic screening.  -     Neisseria gonorrhoeae PCR - Clinic Collect  -     Chlamydia trachomatis PCR - Clinic Collect  -     HIV Antigen Antibody Combo  -     Treponema Abs w Reflex to RPR and Titer    Encounter for supervision of high risk pregnancy with grand multiparity, antepartum  -     ABO/Rh type and screen  -     Hepatitis B surface antigen  -     CBC with " platelets  -     HIV Antigen Antibody Combo  -     Rubella Antibody IgG  -     Treponema Abs w Reflex to RPR and Titer  -     Lead, Venous Blood  -     Hepatitis C antibody  -     Urine Culture Aerobic Bacterial  -     ABO/Rh type and screen  -     Hepatitis B surface antigen  -     CBC with platelets    Urgency of urination: Check UA/UC  -     UA Macroscopic with reflex to Microscopic and Culture - Lab Collect  -     UA Microscopic with Reflex to Culture  -     Urine Culture Aerobic Bacterial      -IUP at 12w2d:   Prenatal labs obtained today  Reviewed cfDNA- elects to complete this today- prefers an envelope be mailed with gender results  Declines carrier screening  Peripartum Anesthesia: the patient does not desire an epidural during labor.   Post-partum Contraception: pills   Breast/Bottle: beast   Reviewed my upcoming maternity leave- will need to transfer care to another [facility] during my leave, but I should return in time for her delivery.  RTC in 4 weeks or sooner with problems. 1 hour GTT next visit    Visit completed along with assistance of [facility] .    Flor Stoddard MD

## 2024-01-27 LAB
BACTERIA UR CULT: ABNORMAL
BACTERIA UR CULT: ABNORMAL
C TRACH DNA SPEC QL NAA+PROBE: NEGATIVE
N GONORRHOEA DNA SPEC QL NAA+PROBE: NEGATIVE

## 2024-01-28 LAB — LEAD BLDV-MCNC: <2 UG/DL

## 2024-01-29 ENCOUNTER — TELEPHONE (OUTPATIENT)
Dept: FAMILY MEDICINE | Facility: CLINIC | Age: 29
End: 2024-01-29
Payer: COMMERCIAL

## 2024-01-29 NOTE — TELEPHONE ENCOUNTER
----- Message from Flor Stoddard MD sent at 1/26/2024  3:42 PM CST -----  Team - please call patient with results.  She has yeast on her vaginal swab- in pregnancy, we do not treat with the pill. I will send the vaginal cream to her pharmacy.

## 2024-01-29 NOTE — TELEPHONE ENCOUNTER
"Writer called patient with the help of a \"Ritesh/Katherine\"  regarding provider's message below. Provider message relayed to patient.    Patient verbalizes understanding, agrees with plan and has no further questions.    Closing encounter.    PAVEL VallejoN, RN   [facility]    "

## 2024-02-02 LAB — SCANNED LAB RESULT: NORMAL

## 2024-02-09 ENCOUNTER — TELEPHONE (OUTPATIENT)
Dept: FAMILY MEDICINE | Facility: CLINIC | Age: 29
End: 2024-02-09
Payer: COMMERCIAL

## 2024-02-09 NOTE — TELEPHONE ENCOUNTER
Ritesh  currently unavailable through  line. [facility] will attempt to call again later.

## 2024-02-09 NOTE — TELEPHONE ENCOUNTER
"----- Message from Flor Stoddard MD sent at 2/2/2024  2:41 PM CST -----  Team - please call patient with results.  Her genetic blood test results were NORMAL  Please print a copy of her \"[facility] non invasive prenatal screen\"  in the results tab- (it also has the gender listed)- click on the blue hyperlink in the results and then print  Send patient a copy in the mail as she has requested.    "

## 2024-02-20 DIAGNOSIS — Z87.59 HISTORY OF SHOULDER DYSTOCIA IN PRIOR PREGNANCY: Primary | ICD-10-CM

## 2024-02-23 ENCOUNTER — PRENATAL OFFICE VISIT (OUTPATIENT)
Dept: FAMILY MEDICINE | Facility: CLINIC | Age: 29
End: 2024-02-23
Payer: COMMERCIAL

## 2024-02-23 VITALS
TEMPERATURE: 98.1 F | SYSTOLIC BLOOD PRESSURE: 116 MMHG | RESPIRATION RATE: 16 BRPM | WEIGHT: 149.5 LBS | HEART RATE: 90 BPM | HEIGHT: 58 IN | BODY MASS INDEX: 31.38 KG/M2 | OXYGEN SATURATION: 100 % | DIASTOLIC BLOOD PRESSURE: 82 MMHG

## 2024-02-23 DIAGNOSIS — N89.8 VAGINAL DISCHARGE: ICD-10-CM

## 2024-02-23 DIAGNOSIS — Z87.59 HISTORY OF SHOULDER DYSTOCIA IN PRIOR PREGNANCY: ICD-10-CM

## 2024-02-23 DIAGNOSIS — Z34.90 PREGNANCY, UNSPECIFIED GESTATIONAL AGE: Primary | ICD-10-CM

## 2024-02-23 DIAGNOSIS — E66.9 OBESITY WITHOUT SERIOUS COMORBIDITY, UNSPECIFIED CLASSIFICATION, UNSPECIFIED OBESITY TYPE: ICD-10-CM

## 2024-02-23 LAB — GLUCOSE 1H P 50 G GLC PO SERPL-MCNC: 124 MG/DL (ref 70–129)

## 2024-02-23 PROCEDURE — 99207 PR PRENATAL VISIT: CPT | Performed by: FAMILY MEDICINE

## 2024-02-23 PROCEDURE — 36415 COLL VENOUS BLD VENIPUNCTURE: CPT | Performed by: FAMILY MEDICINE

## 2024-02-23 PROCEDURE — 82950 GLUCOSE TEST: CPT | Performed by: FAMILY MEDICINE

## 2024-02-23 RX ORDER — MICONAZOLE NITRATE 4 %
1 CREAM WITH PREFILLED APPLICATOR VAGINAL AT BEDTIME
Qty: 25 G | Refills: 0 | Status: SHIPPED | OUTPATIENT
Start: 2024-02-23 | End: 2024-06-13

## 2024-02-23 NOTE — PROGRESS NOTES
"SUBJECTIVE:   at 16w2d. Estimated Date of Delivery: Aug 7, 2024.  She is doing well. She denies nausea and vomiting. She denies abdominal pain/cramping, vaginal bleeding, leakage of fluid. Fetal movement is not yet present.   Concerns: no concerns- she did not receive phone call about her results or NIPT. She would like to know gender today.  ROS  Negative except as noted above in HPI.  OBJECTIVE:  Blood pressure 116/82, pulse 90, temperature 98.1  F (36.7  C), temperature source Oral, resp. rate 16, height 1.48 m (4' 10.27\"), weight 67.8 kg (149 lb 8 oz), last menstrual period 2023, SpO2 100%, not currently breastfeeding.  Gen: comfortable, no acute distress.  See OB Vitals flowsheet.  ASSESSMENT/PLAN:  Colten was seen today for prenatal care.    Diagnoses and all orders for this visit:    Pregnancy, unspecified gestational age  -     US OB > 14 Weeks; Future    History of shoulder dystocia in prior pregnancy: 28 second shoulder dystocia last delivery with fetal macrosomia. Early 1 hour normal today. Will plan for screen 24-28 weeks and growth US around 36 weeks to help guide delivery planning.   -     Glucose tolerance, gest screen, 1 hour    Obesity without serious comorbidity, unspecified classification, unspecified obesity type: Reviewed healthy eating, activity recommendations.  Comments:  pre-pregnancy BMI 31.    Vaginal discharge/Yeast vaginitis: She was unable to pick this up at pharmacy- sent another script and gave her print out with the name.  -     miconazole nitrate (MONISTAT 3) 4 % cream; Place 1 applicator vaginally at bedtime      -IUP at 16w2d:   Prenatal labs reviewed   Reviewed plan of care, including transition of care to Dr. Lang during my maternity leave- scheduled all appointments today  RTC in 4 weeks or sooner with problems.    Visit completed along with assistance of [facility] .    Flor Stoddard MD    "

## 2024-02-26 DIAGNOSIS — B18.1 CHRONIC TYPE B VIRAL HEPATITIS (CMD): Primary | ICD-10-CM

## 2024-03-21 ENCOUNTER — OFFICE VISIT (OUTPATIENT)
Dept: TRANSPLANT | Age: 29
End: 2024-03-21
Attending: NURSE PRACTITIONER

## 2024-03-21 VITALS — TEMPERATURE: 97.2 F | HEART RATE: 90 BPM | OXYGEN SATURATION: 99 %

## 2024-03-21 DIAGNOSIS — B18.1 CHRONIC TYPE B VIRAL HEPATITIS (CMD): ICD-10-CM

## 2024-03-21 PROCEDURE — 91200 LIVER ELASTOGRAPHY: CPT | Performed by: NURSE PRACTITIONER

## 2024-03-22 ENCOUNTER — PRENATAL OFFICE VISIT (OUTPATIENT)
Dept: FAMILY MEDICINE | Facility: CLINIC | Age: 29
End: 2024-03-22
Payer: COMMERCIAL

## 2024-03-22 VITALS
SYSTOLIC BLOOD PRESSURE: 110 MMHG | TEMPERATURE: 98.4 F | DIASTOLIC BLOOD PRESSURE: 62 MMHG | HEART RATE: 84 BPM | BODY MASS INDEX: 31.92 KG/M2 | OXYGEN SATURATION: 99 % | HEIGHT: 58 IN | WEIGHT: 152.06 LBS | RESPIRATION RATE: 16 BRPM

## 2024-03-22 DIAGNOSIS — Z34.90 PREGNANCY, UNSPECIFIED GESTATIONAL AGE: Primary | ICD-10-CM

## 2024-03-22 DIAGNOSIS — Z87.59 HISTORY OF SHOULDER DYSTOCIA IN PRIOR PREGNANCY: ICD-10-CM

## 2024-03-22 PROCEDURE — 99207 PR PRENATAL VISIT: CPT | Performed by: FAMILY MEDICINE

## 2024-03-22 NOTE — PROGRESS NOTES
"SUBJECTIVE:   at 20w2d. Estimated Date of Delivery: Aug 7, 2024.  She is doing well. She denies nausea and vomiting. She denies abdominal pain/cramping, vaginal bleeding, leakage of fluid. Fetal movement is present.   Concerns: none  ROS  Negative except as noted above in HPI.  OBJECTIVE:  Blood pressure 110/62, pulse 84, temperature 98.4  F (36.9  C), temperature source Oral, resp. rate 16, height 1.485 m (4' 10.47\"), weight 69 kg (152 lb 1 oz), last menstrual period 2023, SpO2 99%, not currently breastfeeding.  Gen: comfortable, no acute distress.  See OB Vitals flowsheet.  ASSESSMENT/PLAN:  Colten was seen today for prenatal care.    Diagnoses and all orders for this visit:    Pregnancy, unspecified gestational age    History of shoulder dystocia in prior pregnancy: Early 1 hour GCT normal- will need repeat 24-28 weeks. Reviewed exercise, healthy eating in pregnancy. Consider growth US 36 weeks to help guide delivery planning.      -IUP at 20w2d:   Prenatal labs reviewed   Reviewed second trimester common symptoms- including pelvic pain, low back pain- reviewed use of maternity support belt if needed  Reviewed my upcoming leave and transition to Dr. Lassiter until I return in July  [facility] in 4 weeks or sooner with problems.    Visit completed along with assistance of [employer] .    Flor Stoddard MD    "

## 2024-03-29 ENCOUNTER — HOSPITAL ENCOUNTER (OUTPATIENT)
Dept: ULTRASOUND IMAGING | Facility: HOSPITAL | Age: 29
Discharge: HOME | End: 2024-03-29
Attending: FAMILY MEDICINE | Admitting: FAMILY MEDICINE
Payer: COMMERCIAL

## 2024-03-29 DIAGNOSIS — O43.102 PLACENTAL ABNORMALITY IN SECOND TRIMESTER: Primary | ICD-10-CM

## 2024-03-29 DIAGNOSIS — Z34.90 PREGNANCY, UNSPECIFIED GESTATIONAL AGE: ICD-10-CM

## 2024-03-29 LAB — RADIOLOGIST FLAGS: ABNORMAL

## 2024-03-29 PROCEDURE — 76805 OB US >/= 14 WKS SNGL FETUS: CPT

## 2024-04-01 ENCOUNTER — TELEPHONE (OUTPATIENT)
Dept: FAMILY MEDICINE | Facility: CLINIC | Age: 29
End: 2024-04-01
Payer: COMMERCIAL

## 2024-04-01 ENCOUNTER — PRE VISIT (OUTPATIENT)
Dept: MATERNAL FETAL MEDICINE | Facility: CLINIC | Age: 29
End: 2024-04-01
Payer: COMMERCIAL

## 2024-04-01 ENCOUNTER — TRANSCRIBE ORDERS (OUTPATIENT)
Dept: MATERNAL FETAL MEDICINE | Facility: HOSPITAL | Age: 29
End: 2024-04-01
Payer: COMMERCIAL

## 2024-04-01 ENCOUNTER — TELEPHONE (OUTPATIENT)
Dept: TRANSPLANT | Age: 29
End: 2024-04-01

## 2024-04-01 DIAGNOSIS — O26.90 PREGNANCY RELATED CONDITION: Primary | ICD-10-CM

## 2024-04-01 NOTE — TELEPHONE ENCOUNTER
Called pt with help from an . Relayed message to pt. Pt verbalized understanding and stated someone called her this morning and appt scheduled tomorrow 4/2/24.      Mitesh Aragon, PAVELN RN  [facility]      ----- Message from Flor Stoddard MD sent at 3/29/2024  4:24 PM CDT -----  Team - please call patient with results.  Her ultrasound showed normal growth for baby  There was an area near the placenta that we need to evaluate further- I will place referral to [facility]- they should call her sometime next week to get her scheduled for a repeat ultrasound to further look at this area.

## 2024-04-02 ENCOUNTER — HOSPITAL ENCOUNTER (OUTPATIENT)
Dept: ULTRASOUND IMAGING | Facility: CLINIC | Age: 29
Discharge: HOME | End: 2024-04-02
Attending: FAMILY MEDICINE
Payer: COMMERCIAL

## 2024-04-02 ENCOUNTER — OFFICE VISIT (OUTPATIENT)
Dept: MATERNAL FETAL MEDICINE | Facility: CLINIC | Age: 29
End: 2024-04-02
Attending: FAMILY MEDICINE
Payer: COMMERCIAL

## 2024-04-02 DIAGNOSIS — O43.102 PLACENTA, ABNORMAL, SECOND TRIMESTER: ICD-10-CM

## 2024-04-02 DIAGNOSIS — O26.90 PREGNANCY RELATED CONDITION: ICD-10-CM

## 2024-04-02 DIAGNOSIS — O26.90 PREGNANCY RELATED CONDITION, ANTEPARTUM: Primary | ICD-10-CM

## 2024-04-02 PROCEDURE — 76811 OB US DETAILED SNGL FETUS: CPT

## 2024-04-02 PROCEDURE — 99213 OFFICE O/P EST LOW 20 MIN: CPT | Mod: 25 | Performed by: OBSTETRICS & GYNECOLOGY

## 2024-04-02 PROCEDURE — 76811 OB US DETAILED SNGL FETUS: CPT | Mod: 26 | Performed by: OBSTETRICS & GYNECOLOGY

## 2024-04-02 NOTE — PROGRESS NOTES
Please see the imaging tab for details of the ultrasound performed today.    Katy Jarvis MD  Specialist in Maternal-Fetal Medicine

## 2024-04-02 NOTE — NURSING NOTE
Pt requested Branch  used for [facility] appt- ID [id number].  Pt at [facility] for ultrasound- see detailed report under imaging tab.  Pt reports no complaints- denies contractions, bleeding, loss of fluid.  SBAR given to MD

## 2024-04-16 ENCOUNTER — PRENATAL OFFICE VISIT (OUTPATIENT)
Dept: FAMILY MEDICINE | Facility: CLINIC | Age: 29
End: 2024-04-16
Payer: COMMERCIAL

## 2024-04-16 VITALS
WEIGHT: 157.04 LBS | RESPIRATION RATE: 20 BRPM | HEART RATE: 84 BPM | HEIGHT: 58 IN | DIASTOLIC BLOOD PRESSURE: 81 MMHG | TEMPERATURE: 98.1 F | SYSTOLIC BLOOD PRESSURE: 138 MMHG | BODY MASS INDEX: 32.96 KG/M2 | OXYGEN SATURATION: 99 %

## 2024-04-16 DIAGNOSIS — O43.102 PLACENTAL ABNORMALITY IN SECOND TRIMESTER: ICD-10-CM

## 2024-04-16 DIAGNOSIS — Z34.90 PREGNANCY, UNSPECIFIED GESTATIONAL AGE: Primary | ICD-10-CM

## 2024-04-16 DIAGNOSIS — B18.1 CHRONIC TYPE B VIRAL HEPATITIS (CMD): Primary | ICD-10-CM

## 2024-04-16 PROCEDURE — 99207 PR PRENATAL VISIT: CPT | Performed by: FAMILY MEDICINE

## 2024-04-16 NOTE — PROGRESS NOTES
"SUBJECTIVE:  Colten [facility]  at 23w6d by 11w2d US not c/w LMP. Estimated Date of Delivery: Aug 7, 2024.  She is doing well. She denies abdominal pain/cramping, vaginal bleeding, leakage of fluid. Fetal movement is present. She denies headache, vision changes, lower extremity edema.  Concerns:   None  She understands the abnormality of the placenta, knows she has another ultrasound on   Requests note for county for applying for food stamps    ROS:  Negative except as noted above in HPI.    OBJECTIVE:  Vitals: /81   Pulse 84   Temp 98.1  F (36.7  C) (Oral)   Resp 20   Ht 1.473 m (4' 10\")   Wt 71.2 kg (157 lb 0.6 oz)   LMP 2023 (Approximate)   SpO2 99%   BMI 32.82 kg/m     Total weight gain:  3.647 kg (8 lb 0.6 oz)   Exam: Per flowsheet    ASSESSMENT/PLAN:  Colten was seen today for prenatal care.    Diagnoses and all orders for this visit:    Pregnancy, unspecified gestational age  -     Cancel: Primary Care - Care Coordination Referral; Future    Placental abnormality in second trimester      29 year old  at 23w6d   My first time meeting this patient, she was seeing Dr. Stoddard who is out on maternity leave.  Fetal survey was done at 21 weeks and was Abnormal - large placental lakes. Patient was referred to [facility] and ultrasound showed large placental lake with an area suggestive of placental infarction - growth US scheduled for /, 26w  Having a baby girl! She has 3 other children, 10yo, 5yo, and just over 2yo  Requests letter for food stamps stating she is pregnant. Provided to patient and she also asks that we fax to number she provides.  BP borderline today but no symptoms of preeclampsia. Continue to monitor.  1h GTT to be done at next visit  RTC as scheduled or sooner with problems.    A [employer]  was used for this visit.    "

## 2024-04-16 NOTE — LETTER
April 16, 2024      Colten Meade  [address] [place] [address]        To Whom It May Concern,     Colten Meade is a patient at the [facility]. She is pregnant with an expected due date of 8/7/2024.      Sincerely,        Jovanna Lang MD

## 2024-04-18 ENCOUNTER — PATIENT OUTREACH (OUTPATIENT)
Dept: CARE COORDINATION | Facility: CLINIC | Age: 29
End: 2024-04-18
Payer: COMMERCIAL

## 2024-04-18 NOTE — PROGRESS NOTES
Clinic Care Coordination Contact  Community Health Worker Initial Outreach    CHW Initial Information Gathering:  Referral Source: PCP  Preferred Hospital: [facility]  [phone]  Preferred Urgent Care: [facility] [place], [phone]  Current living arrangement:: I live in a private home with family  Type of residence:: Apartment  Community Resources: WIC  Supplies Currently Used at Home: None  Equipment Currently Used at Home: none  Informal Support system:: Family  No PCP office visit in Past Year: No  Transportation means:: Accessible car, Family  CHW Additional Questions  If ED/Hospital discharge, follow-up appointment scheduled as recommended?: N/A  Medication changes made following ED/Hospital discharge?: N/A  MyChart active?: No  Patient agreeable to assistance with activating MyChart?: No    Patient accepts CC: No, CHW assisted patient and connected with [facility] Pamela. Patient will be sent Care Coordination introduction letter for future reference.     Patient is receiving WIC, patient's SNAP was not active or balance was in the SNAP, patient provided CHW [facility] 's name and contact, made conference call to Pamela ORTEGA at [phone], Pamela picked up and we check EBT again and balance is active after 10 AM. The SNAP balance is $973 for family of 5, no additional needs identify and patient was informed to call with questions or concerns.

## 2024-04-30 ENCOUNTER — ANCILLARY PROCEDURE (OUTPATIENT)
Dept: ULTRASOUND IMAGING | Facility: HOSPITAL | Age: 29
End: 2024-04-30
Attending: OBSTETRICS & GYNECOLOGY
Payer: COMMERCIAL

## 2024-04-30 ENCOUNTER — OFFICE VISIT (OUTPATIENT)
Dept: TRANSPLANT | Age: 29
End: 2024-04-30
Attending: INTERNAL MEDICINE

## 2024-04-30 ENCOUNTER — OFFICE VISIT (OUTPATIENT)
Dept: MATERNAL FETAL MEDICINE | Facility: HOSPITAL | Age: 29
End: 2024-04-30
Attending: OBSTETRICS & GYNECOLOGY
Payer: COMMERCIAL

## 2024-04-30 ENCOUNTER — APPOINTMENT (OUTPATIENT)
Dept: LAB | Age: 29
End: 2024-04-30
Attending: INTERNAL MEDICINE

## 2024-04-30 VITALS
OXYGEN SATURATION: 98 % | SYSTOLIC BLOOD PRESSURE: 142 MMHG | HEIGHT: 56 IN | TEMPERATURE: 97 F | HEART RATE: 74 BPM | BODY MASS INDEX: 33.7 KG/M2 | WEIGHT: 149.8 LBS | DIASTOLIC BLOOD PRESSURE: 92 MMHG

## 2024-04-30 DIAGNOSIS — O26.90 PREGNANCY RELATED CONDITION, ANTEPARTUM: ICD-10-CM

## 2024-04-30 DIAGNOSIS — O43.102 PLACENTAL ABNORMALITY IN SECOND TRIMESTER: Primary | ICD-10-CM

## 2024-04-30 DIAGNOSIS — B18.1 CHRONIC TYPE B VIRAL HEPATITIS  (CMD): Primary | ICD-10-CM

## 2024-04-30 PROCEDURE — 76816 OB US FOLLOW-UP PER FETUS: CPT | Mod: 26 | Performed by: OBSTETRICS & GYNECOLOGY

## 2024-04-30 PROCEDURE — 10004095 HB COUNTER, VISIT, NON TRANSPLANT

## 2024-04-30 PROCEDURE — 99211 OFF/OP EST MAY X REQ PHY/QHP: CPT

## 2024-04-30 PROCEDURE — 76816 OB US FOLLOW-UP PER FETUS: CPT

## 2024-04-30 PROCEDURE — 99213 OFFICE O/P EST LOW 20 MIN: CPT | Mod: 25 | Performed by: OBSTETRICS & GYNECOLOGY

## 2024-04-30 PROCEDURE — 99214 OFFICE O/P EST MOD 30 MIN: CPT | Performed by: INTERNAL MEDICINE

## 2024-04-30 NOTE — NURSING NOTE
Colten Meade is a  at 25w6d who presents to [facility] for scheduled growth ultrasound. Pt reports positive fetal movement. SBAR given to Dr. Campos, see note in Epic.

## 2024-04-30 NOTE — PROGRESS NOTES
The patient was seen for an ultrasound in the Maternal-Fetal Medicine Center at the [facility] today.  For a detailed report of the ultrasound examination, please see the ultrasound report which can be found under the imaging tab.    If you have questions regarding today's evaluation or if we can be of further service, please contact the Maternal-Fetal Medicine Center.    Baylee Campos MD  , OB/GYN  Maternal-Fetal Medicine  [phone] (Pager)

## 2024-05-14 ENCOUNTER — PRENATAL OFFICE VISIT (OUTPATIENT)
Dept: FAMILY MEDICINE | Facility: CLINIC | Age: 29
End: 2024-05-14
Payer: COMMERCIAL

## 2024-05-14 VITALS
HEIGHT: 58 IN | RESPIRATION RATE: 15 BRPM | TEMPERATURE: 97.8 F | BODY MASS INDEX: 34 KG/M2 | WEIGHT: 162 LBS | OXYGEN SATURATION: 99 % | HEART RATE: 74 BPM | DIASTOLIC BLOOD PRESSURE: 71 MMHG | SYSTOLIC BLOOD PRESSURE: 109 MMHG

## 2024-05-14 DIAGNOSIS — O43.102 PLACENTAL ABNORMALITY IN SECOND TRIMESTER: ICD-10-CM

## 2024-05-14 DIAGNOSIS — Z87.59 HISTORY OF SHOULDER DYSTOCIA IN PRIOR PREGNANCY: ICD-10-CM

## 2024-05-14 DIAGNOSIS — O09.40 ENCOUNTER FOR SUPERVISION OF HIGH RISK PREGNANCY WITH GRAND MULTIPARITY, ANTEPARTUM: Primary | ICD-10-CM

## 2024-05-14 LAB
ERYTHROCYTE [DISTWIDTH] IN BLOOD BY AUTOMATED COUNT: 13.8 % (ref 10–15)
FERRITIN SERPL-MCNC: 54 NG/ML (ref 6–175)
GLUCOSE 1H P 50 G GLC PO SERPL-MCNC: 124 MG/DL (ref 70–129)
HCT VFR BLD AUTO: 35.1 % (ref 35–47)
HGB BLD-MCNC: 11.7 G/DL (ref 11.7–15.7)
HOLD SPECIMEN: NORMAL
IRON BINDING CAPACITY (ROCHE): 395 UG/DL (ref 240–430)
IRON SATN MFR SERPL: 12 % (ref 15–46)
IRON SERPL-MCNC: 47 UG/DL (ref 37–145)
MCH RBC QN AUTO: 32 PG (ref 26.5–33)
MCHC RBC AUTO-ENTMCNC: 33.3 G/DL (ref 31.5–36.5)
MCV RBC AUTO: 96 FL (ref 78–100)
PLATELET # BLD AUTO: 171 10E3/UL (ref 150–450)
RBC # BLD AUTO: 3.66 10E6/UL (ref 3.8–5.2)
WBC # BLD AUTO: 8.6 10E3/UL (ref 4–11)

## 2024-05-14 PROCEDURE — 36415 COLL VENOUS BLD VENIPUNCTURE: CPT | Performed by: FAMILY MEDICINE

## 2024-05-14 PROCEDURE — 86780 TREPONEMA PALLIDUM: CPT | Performed by: FAMILY MEDICINE

## 2024-05-14 PROCEDURE — 99207 PR PRENATAL VISIT: CPT | Performed by: FAMILY MEDICINE

## 2024-05-14 PROCEDURE — 83550 IRON BINDING TEST: CPT | Performed by: FAMILY MEDICINE

## 2024-05-14 PROCEDURE — 83540 ASSAY OF IRON: CPT | Performed by: FAMILY MEDICINE

## 2024-05-14 PROCEDURE — 90715 TDAP VACCINE 7 YRS/> IM: CPT | Performed by: FAMILY MEDICINE

## 2024-05-14 PROCEDURE — 90471 IMMUNIZATION ADMIN: CPT | Performed by: FAMILY MEDICINE

## 2024-05-14 PROCEDURE — 82728 ASSAY OF FERRITIN: CPT | Performed by: FAMILY MEDICINE

## 2024-05-14 PROCEDURE — 82950 GLUCOSE TEST: CPT | Performed by: FAMILY MEDICINE

## 2024-05-14 PROCEDURE — 85027 COMPLETE CBC AUTOMATED: CPT | Performed by: FAMILY MEDICINE

## 2024-05-14 NOTE — PROGRESS NOTES
"SUBJECTIVE:  Colten [facility]  at 27w6d by 11w2d US not c/w LMP. Estimated Date of Delivery: Aug 7, 2024.  She is doing well. She denies abdominal pain/cramping, vaginal bleeding, leakage of fluid. Fetal movement is normal. She denies headache, vision changes, lower extremity edema.  Concerns: Doesn't feel like her belly is growing as much as other pregnancies, wants to be sure that her baby is growing okay    ROS:  Negative except as noted above in HPI.    OBJECTIVE:  Vitals: /71 (BP Location: Right arm, Patient Position: Sitting, Cuff Size: Adult Regular)   Pulse 74   Temp 97.8  F (36.6  C) (Oral)   Resp 15   Ht 1.473 m (4' 10\")   Wt 73.5 kg (162 lb)   LMP 2023 (Approximate)   SpO2 99%   BMI 33.86 kg/m     Total weight gain:  5.897 kg (13 lb)   Exam: Per flowsheet    ASSESSMENT/PLAN:  Colten was seen today for prenatal care.    Diagnoses and all orders for this visit:    Encounter for supervision of high risk pregnancy with grand multiparity, antepartum  -     Glucose tolerance, gest screen, 1 hour; Future  -     CBC with Platelets and Reflex to Iron Studies; Future  -     Treponema Abs w Reflex to RPR and Titer; Future  -     Glucose tolerance, gest screen, 1 hour  -     CBC with Platelets and Reflex to Iron Studies  -     Treponema Abs w Reflex to RPR and Titer  -     TDAP 7+ (ADACEL,BOOSTRIX)  -     Iron & Iron Binding Capacity  -     Ferritin    Placental abnormality in second trimester    History of shoulder dystocia in prior pregnancy      29 year old  at 27w6d   Fetal survey was done and was Abnormal - placental lakes - now following with MFM given large placental lakes. Last US was on  with EFW 23%, redemonstrated lake a with another new area concerning for another placental lake, and areas of infarct or thrombus within this. Will continue to see MFM for q3-4w growth US, next scheduled for .  Reviewed history - notable for fetal demise at 18w (first pregnancy) and  30sec " shoulder dystocia (baby was approx 9lb, no GDM per her report).  1h GTT and repeat CBC and syphilis ordered  Tdap done today.  She will see Dr. Tee for next appointment, then me, then Dr. Stoddard when she returns.  RTC as scheduled or sooner with problems.    A [employer]  was used for this visit.

## 2024-05-15 LAB — T PALLIDUM AB SER QL: NONREACTIVE

## 2024-05-16 ENCOUNTER — TELEPHONE (OUTPATIENT)
Dept: FAMILY MEDICINE | Facility: CLINIC | Age: 29
End: 2024-05-16
Payer: COMMERCIAL

## 2024-05-16 NOTE — TELEPHONE ENCOUNTER
----- Message from Jovanna Lang MD sent at 5/15/2024  4:04 PM CDT -----  Team - please call patient with results. Your labs were normal.  No gestational diabetes.  Your hemoglobin (red blood cell count) is normal. You do not have syphilis.

## 2024-05-16 NOTE — TELEPHONE ENCOUNTER
[facility] . Relayed result message from Dr. Lassiter. Patient verbalizes understanding, denies questions.    Flor Pierre RN  [facility]

## 2024-05-29 ENCOUNTER — OFFICE VISIT (OUTPATIENT)
Dept: MATERNAL FETAL MEDICINE | Facility: HOSPITAL | Age: 29
End: 2024-05-29
Attending: STUDENT IN AN ORGANIZED HEALTH CARE EDUCATION/TRAINING PROGRAM
Payer: COMMERCIAL

## 2024-05-29 ENCOUNTER — TRANSFERRED RECORDS (OUTPATIENT)
Dept: HEALTH INFORMATION MANAGEMENT | Facility: CLINIC | Age: 29
End: 2024-05-29

## 2024-05-29 ENCOUNTER — ANCILLARY PROCEDURE (OUTPATIENT)
Dept: ULTRASOUND IMAGING | Facility: HOSPITAL | Age: 29
End: 2024-05-29
Attending: STUDENT IN AN ORGANIZED HEALTH CARE EDUCATION/TRAINING PROGRAM
Payer: COMMERCIAL

## 2024-05-29 DIAGNOSIS — O43.102 PLACENTAL ABNORMALITY IN SECOND TRIMESTER: Primary | ICD-10-CM

## 2024-05-29 DIAGNOSIS — O43.103 PLACENTAL ABNORMALITY IN THIRD TRIMESTER: ICD-10-CM

## 2024-05-29 DIAGNOSIS — O43.102 PLACENTAL ABNORMALITY IN SECOND TRIMESTER: ICD-10-CM

## 2024-05-29 PROCEDURE — 99213 OFFICE O/P EST LOW 20 MIN: CPT | Mod: 25 | Performed by: STUDENT IN AN ORGANIZED HEALTH CARE EDUCATION/TRAINING PROGRAM

## 2024-05-29 PROCEDURE — 76816 OB US FOLLOW-UP PER FETUS: CPT

## 2024-05-29 PROCEDURE — 76816 OB US FOLLOW-UP PER FETUS: CPT | Mod: 26 | Performed by: STUDENT IN AN ORGANIZED HEALTH CARE EDUCATION/TRAINING PROGRAM

## 2024-05-29 NOTE — NURSING NOTE
Colten Meade is a  at 30w0d who presents to [facility] for scheduled follow up growth ultrasound. Pt reports positive fetal movement. SBAR given to Dr. BAN Marroquin, see note in Epic.   Ritesh  via IPAD utilized for entirety of visit today.

## 2024-05-29 NOTE — PROGRESS NOTES
"Please see \"Imaging\" tab under \"Chart Review\" for details of today's visit.    Lima Marroquin    "

## 2024-05-30 ENCOUNTER — PRENATAL OFFICE VISIT (OUTPATIENT)
Dept: FAMILY MEDICINE | Facility: CLINIC | Age: 29
End: 2024-05-30
Payer: COMMERCIAL

## 2024-05-30 VITALS
RESPIRATION RATE: 18 BRPM | OXYGEN SATURATION: 98 % | HEIGHT: 58 IN | SYSTOLIC BLOOD PRESSURE: 106 MMHG | DIASTOLIC BLOOD PRESSURE: 62 MMHG | TEMPERATURE: 98.2 F | BODY MASS INDEX: 34.43 KG/M2 | HEART RATE: 74 BPM | WEIGHT: 164 LBS

## 2024-05-30 DIAGNOSIS — O43.103 PLACENTAL ABNORMALITY IN THIRD TRIMESTER: ICD-10-CM

## 2024-05-30 DIAGNOSIS — O09.90 HIGH-RISK PREGNANCY, UNSPECIFIED TRIMESTER: Primary | ICD-10-CM

## 2024-05-30 PROCEDURE — 99207 PR COMPLICATED OB VISIT: CPT | Performed by: FAMILY MEDICINE

## 2024-05-30 NOTE — PROGRESS NOTES
"SUBJECTIVE:  Colten [facility]  at 30w1d. Estimated Date of Delivery: Aug 7, 2024.  She is doing well. She denies vaginal bleeding, leakage of fluid, or urinary symptoms. Fetal movement is present. She is not having contractions.  Concerns: just had follow up ultrasound with MFM yesterday. Viewed placenta  ROS  Negative except as noted above in HPI  OBJECTIVE:  Blood pressure 106/62, pulse 74, temperature 98.2  F (36.8  C), temperature source Oral, resp. rate 18, height 1.473 m (4' 10\"), weight 74.4 kg (164 lb), last menstrual period 2023, SpO2 98%, not currently breastfeeding.  Gen: Comfortable, no acute distress.  Abd: Gravid, non-tender  See OB Vitals flowsheet.  ASSESSMENT/PLAN:  Colten was seen today for prenatal care.    Diagnoses and all orders for this visit:    High-risk pregnancy, unspecified trimester    Placental abnormality in third trimester: last ultrasound was yesterday, showing placental lakes and infarcts. EFW was at 27th %ile. Next one scheduled in 3 weeks.       -IUP at 30w1d:   Prenatal labs reviewed and normal.   Cell-free DNA testing done; results came back normal.    Contraception: pills  Breast/Bottle: Breast  GBS positive from urine  RTC in 2 weeks or sooner with problems.    Visit completed along with assistance of [employer] .  Dorothy Tee MD    "

## 2024-06-13 ENCOUNTER — PRENATAL OFFICE VISIT (OUTPATIENT)
Dept: FAMILY MEDICINE | Facility: CLINIC | Age: 29
End: 2024-06-13
Payer: COMMERCIAL

## 2024-06-13 VITALS
HEART RATE: 87 BPM | HEIGHT: 58 IN | DIASTOLIC BLOOD PRESSURE: 73 MMHG | RESPIRATION RATE: 18 BRPM | OXYGEN SATURATION: 98 % | BODY MASS INDEX: 34.59 KG/M2 | WEIGHT: 164.8 LBS | SYSTOLIC BLOOD PRESSURE: 112 MMHG | TEMPERATURE: 99.3 F

## 2024-06-13 DIAGNOSIS — O43.103 PLACENTAL ABNORMALITY IN THIRD TRIMESTER: ICD-10-CM

## 2024-06-13 DIAGNOSIS — G89.29 CHRONIC LEFT-SIDED LOW BACK PAIN WITH LEFT-SIDED SCIATICA: ICD-10-CM

## 2024-06-13 DIAGNOSIS — M54.42 CHRONIC LEFT-SIDED LOW BACK PAIN WITH LEFT-SIDED SCIATICA: ICD-10-CM

## 2024-06-13 DIAGNOSIS — O09.90 HIGH-RISK PREGNANCY, UNSPECIFIED TRIMESTER: Primary | ICD-10-CM

## 2024-06-13 PROCEDURE — 99213 OFFICE O/P EST LOW 20 MIN: CPT | Mod: 25 | Performed by: FAMILY MEDICINE

## 2024-06-13 PROCEDURE — 99207 PR PRENATAL VISIT: CPT | Performed by: FAMILY MEDICINE

## 2024-06-13 RX ORDER — ACETAMINOPHEN 500 MG
500-1000 TABLET ORAL EVERY 6 HOURS PRN
Qty: 100 TABLET | Refills: 1 | Status: SHIPPED | OUTPATIENT
Start: 2024-06-13 | End: 2024-09-20

## 2024-06-13 NOTE — PROGRESS NOTES
"SUBJECTIVE:  Colten [facility]  at 32w1d by 11w2d US not c/w LMP. Estimated Date of Delivery: Aug 7, 2024.  She is doing well. She denies abdominal pain/cramping, vaginal bleeding, leakage of fluid. Fetal movement is normal. She denies headache, vision changes, lower extremity edema.  Concerns: having back pain on left, goes into left buttock, sometimes goes into leg as well, when she sits, she has to keep leg straight, no weakness, no bowel or bladder incontinence  Doesn't like the belly band - has it from prior pregnancy  Not interested in PT because it would have to be another thing she would have to do; kids at home  Has had this pain in the past even when not pregnant    ROS:  Negative except as noted above in HPI.    OBJECTIVE:  Vitals: /73   Pulse 87   Temp 99.3  F (37.4  C) (Oral)   Resp 18   Ht 1.473 m (4' 10\")   Wt 74.8 kg (164 lb 12.8 oz)   LMP 2023 (Approximate)   SpO2 98%   BMI 34.44 kg/m     Total weight gain:  7.167 kg (15 lb 12.8 oz)   Exam: Per flowsheet    ASSESSMENT/PLAN:  Colten was seen today for prenatal care.    Diagnoses and all orders for this visit:    High-risk pregnancy, unspecified trimester    Chronic left-sided low back pain with left-sided sciatica    Placental abnormality in third trimester      29 year old  at 32w1d   Advised to use Tylenol as needed for back pain.  I offered a maternity back brace and she said that she has been from her prior pregnancy and that she does not find it helpful.  Went over some stretches that she can do at home.  She does not feel she has time for physical therapy.  She has a follow-up growth assessment scheduled for  given her placental abnormalities.  Pain management during labor: will discuss more at next visit  Post-partum Contraception: oral contraceptives   Breast/Bottle: Breast and bottle; previously did breastfeeding only, but she would like option to give baby a bottle sometimes - either formula or pumped breastmilk. " Tried using pump before and would get small volumes. She still has pump from last pregnancy, she says she got it from clinic or hospital (so likely [facility]). She thinks she would be interested in trying a different pump - advised her she could get Spectra in hospital postpartum.  RTC as scheduled or sooner with problems.    A [employer]  was used for this visit.

## 2024-06-20 ENCOUNTER — ANCILLARY PROCEDURE (OUTPATIENT)
Dept: ULTRASOUND IMAGING | Facility: HOSPITAL | Age: 29
End: 2024-06-20
Attending: STUDENT IN AN ORGANIZED HEALTH CARE EDUCATION/TRAINING PROGRAM
Payer: COMMERCIAL

## 2024-06-20 ENCOUNTER — OFFICE VISIT (OUTPATIENT)
Dept: MATERNAL FETAL MEDICINE | Facility: HOSPITAL | Age: 29
End: 2024-06-20
Attending: STUDENT IN AN ORGANIZED HEALTH CARE EDUCATION/TRAINING PROGRAM
Payer: COMMERCIAL

## 2024-06-20 DIAGNOSIS — O43.103 PLACENTAL ABNORMALITY IN THIRD TRIMESTER: ICD-10-CM

## 2024-06-20 DIAGNOSIS — O43.103 PLACENTAL ABNORMALITY IN THIRD TRIMESTER: Primary | ICD-10-CM

## 2024-06-20 PROCEDURE — 76816 OB US FOLLOW-UP PER FETUS: CPT | Mod: 26 | Performed by: STUDENT IN AN ORGANIZED HEALTH CARE EDUCATION/TRAINING PROGRAM

## 2024-06-20 PROCEDURE — 99212 OFFICE O/P EST SF 10 MIN: CPT | Mod: 25 | Performed by: STUDENT IN AN ORGANIZED HEALTH CARE EDUCATION/TRAINING PROGRAM

## 2024-06-20 PROCEDURE — G0463 HOSPITAL OUTPT CLINIC VISIT: HCPCS | Performed by: STUDENT IN AN ORGANIZED HEALTH CARE EDUCATION/TRAINING PROGRAM

## 2024-06-20 PROCEDURE — 76816 OB US FOLLOW-UP PER FETUS: CPT

## 2024-06-20 NOTE — NURSING NOTE
Colten Meade is a  at 33w1d who presents to [facility] for scheduled growth ultrasound. Pt reports positive fetal movement. SBAR given to Dr. BAN Marroquin, see note in Epic.

## 2024-06-25 ENCOUNTER — PRENATAL OFFICE VISIT (OUTPATIENT)
Dept: FAMILY MEDICINE | Facility: CLINIC | Age: 29
End: 2024-06-25
Payer: COMMERCIAL

## 2024-06-25 VITALS
DIASTOLIC BLOOD PRESSURE: 69 MMHG | WEIGHT: 166.04 LBS | HEART RATE: 78 BPM | BODY MASS INDEX: 34.85 KG/M2 | OXYGEN SATURATION: 98 % | SYSTOLIC BLOOD PRESSURE: 107 MMHG | TEMPERATURE: 97.3 F | HEIGHT: 58 IN | RESPIRATION RATE: 16 BRPM

## 2024-06-25 DIAGNOSIS — O09.90 HIGH-RISK PREGNANCY, UNSPECIFIED TRIMESTER: Primary | ICD-10-CM

## 2024-06-25 DIAGNOSIS — M54.42 CHRONIC LEFT-SIDED LOW BACK PAIN WITH LEFT-SIDED SCIATICA: ICD-10-CM

## 2024-06-25 DIAGNOSIS — O43.103 PLACENTAL ABNORMALITY IN THIRD TRIMESTER: ICD-10-CM

## 2024-06-25 DIAGNOSIS — G89.29 CHRONIC LEFT-SIDED LOW BACK PAIN WITH LEFT-SIDED SCIATICA: ICD-10-CM

## 2024-06-25 PROCEDURE — 99207 PR PRENATAL VISIT: CPT | Performed by: FAMILY MEDICINE

## 2024-06-25 PROCEDURE — T1013 SIGN LANG/ORAL INTERPRETER: HCPCS | Mod: U3

## 2024-06-25 NOTE — PROGRESS NOTES
"SUBJECTIVE:  Colten [facility]  at 33w6d by 11w2d US not c/w LMP. Estimated Date of Delivery: Aug 7, 2024.  She is doing well. She denies abdominal pain/cramping, vaginal bleeding, leakage of fluid. Fetal movement is normal. She denies headache, vision changes, lower extremity edema.  Concerns: Having little pains here and there  Back is very painful, hard when she is sleeping  Taking tylenol, and using back brace    ROS:  Negative except as noted above in HPI.    OBJECTIVE:  Vitals: /69   Pulse 78   Temp 97.3  F (36.3  C) (Temporal)   Resp 16   Ht 1.473 m (4' 10\")   Wt 75.3 kg (166 lb 0.6 oz)   LMP 2023 (Approximate)   SpO2 98%   BMI 34.70 kg/m     Total weight gain:  7.729 kg (17 lb 0.6 oz)   Exam: Per flowsheet    ASSESSMENT/PLAN:  Colten was seen today for prenatal care.    Diagnoses and all orders for this visit:    High-risk pregnancy, unspecified trimester    Placental abnormality in third trimester      29 year old  at 33w6d   Had ultrasound with MFM on  and showed EFW 55%, plan for another growth US on , which is anticipated to be last prior to delivery  Using tylenol and back brace for back pain with fairly good effect.  Pain management during labor: plans unmedicated  Post-partum Contraception: pills   Breast/Bottle: Breast   Reviewed plans for getting to the hospital.  RTC as scheduled or sooner with problems.    A [employer]  was used for this visit.    "

## 2024-07-12 ENCOUNTER — PRENATAL OFFICE VISIT (OUTPATIENT)
Dept: FAMILY MEDICINE | Facility: CLINIC | Age: 29
End: 2024-07-12
Payer: COMMERCIAL

## 2024-07-12 VITALS
RESPIRATION RATE: 16 BRPM | DIASTOLIC BLOOD PRESSURE: 63 MMHG | HEART RATE: 82 BPM | TEMPERATURE: 98.5 F | BODY MASS INDEX: 34.93 KG/M2 | OXYGEN SATURATION: 97 % | SYSTOLIC BLOOD PRESSURE: 98 MMHG | HEIGHT: 58 IN | WEIGHT: 166.38 LBS

## 2024-07-12 DIAGNOSIS — G56.03 BILATERAL CARPAL TUNNEL SYNDROME: ICD-10-CM

## 2024-07-12 DIAGNOSIS — O09.90 HIGH-RISK PREGNANCY, UNSPECIFIED TRIMESTER: Primary | ICD-10-CM

## 2024-07-12 DIAGNOSIS — Z87.59 HISTORY OF SHOULDER DYSTOCIA IN PRIOR PREGNANCY: ICD-10-CM

## 2024-07-12 DIAGNOSIS — O43.103 PLACENTAL ABNORMALITY IN THIRD TRIMESTER: ICD-10-CM

## 2024-07-12 PROCEDURE — 87653 STREP B DNA AMP PROBE: CPT | Performed by: FAMILY MEDICINE

## 2024-07-12 PROCEDURE — 99207 PR PRENATAL VISIT: CPT | Performed by: FAMILY MEDICINE

## 2024-07-12 NOTE — PROGRESS NOTES
"SUBJECTIVE:    at 36w2d. Estimated Date of Delivery: Aug 7, 2024.  She is doing well. She denies vaginal bleeding, leakage of fluid, or urinary symptoms. Fetal movement is present. She is not having contractions.  Concerns: having some low back pain- she had this with other pregnancies as well.   ROS  Negative except as noted above in HPI  OBJECTIVE:  Blood pressure 98/63, pulse 82, temperature 98.5  F (36.9  C), temperature source Oral, resp. rate 16, height 1.473 m (4' 10\"), weight 75.5 kg (166 lb 6 oz), last menstrual period 2023, SpO2 97%, not currently breastfeeding.  Gen: Comfortable, no acute distress.  Abd: Gravid, non-tender  See OB Vitals flowsheet.  ASSESSMENT/PLAN:  Colten was seen today for prenatal care.    Diagnoses and all orders for this visit:    High-risk pregnancy, unspecified trimester  -     Group B strep PCR  -     Ob/Gyn  Referral; Future    Placental abnormality in third trimester: Large placental cristhian/infarctions. Follows with MFM- plast growth US showed normal growth EFW 56%ile. AC 84%ile. She has last growth US next week and plan for induction at 39 weeks- reviewed this plan with patient. Will need to further discuss induction planning at next appointment.    History of shoulder dystocia in prior pregnancy: Growth US show normal interval growth- planning induction 39 weeks due to placental lakes/infarcts.    Bilateral carpal tunnel syndrome: Declines wrist brace- continue monitoring. Reviewed diagnosis with patient.    Breech presentation, single or unspecified fetus: Bedside US today shows persistent breech presentation. Reviewed option for primary  vs ECV- she desires ECV. Placed referral to [facility]- and sent to specialty scheduling to assist.  -     Ob/Gyn  Referral; Future      -IUP at 36w2d:   Prenatal labs reviewed   RTC in 1 weeks or sooner with problems. Hospital form next visit    Visit completed along with assistance of Ritesh" .    Flor Stoddard MD

## 2024-07-13 LAB — GP B STREP DNA SPEC QL NAA+PROBE: NEGATIVE

## 2024-07-18 ENCOUNTER — PRENATAL OFFICE VISIT (OUTPATIENT)
Dept: FAMILY MEDICINE | Facility: CLINIC | Age: 29
End: 2024-07-18
Payer: COMMERCIAL

## 2024-07-18 VITALS
RESPIRATION RATE: 16 BRPM | TEMPERATURE: 98.7 F | HEART RATE: 68 BPM | SYSTOLIC BLOOD PRESSURE: 108 MMHG | DIASTOLIC BLOOD PRESSURE: 67 MMHG | BODY MASS INDEX: 35.33 KG/M2 | HEIGHT: 58 IN | OXYGEN SATURATION: 98 % | WEIGHT: 168.3 LBS

## 2024-07-18 DIAGNOSIS — Z34.90 PREGNANCY, UNSPECIFIED GESTATIONAL AGE: Primary | ICD-10-CM

## 2024-07-18 DIAGNOSIS — O43.103 PLACENTAL ABNORMALITY IN THIRD TRIMESTER: ICD-10-CM

## 2024-07-18 PROCEDURE — 99207 PR PRENATAL VISIT: CPT | Performed by: FAMILY MEDICINE

## 2024-07-18 NOTE — PROGRESS NOTES
"SUBJECTIVE:   at 37w1d. Estimated Date of Delivery: Aug 7, 2024.  She is doing well. She denies vaginal bleeding, leakage of fluid, or urinary symptoms. Fetal movement is present. She is not having contractions.  Concerns: feeling more pelvic pressure with walking. She had appointment at [facility] for ECV consult.   ROS  Negative except as noted above in HPI  OBJECTIVE:  Blood pressure 108/67, pulse 68, temperature 98.7  F (37.1  C), temperature source Oral, resp. rate 16, height 1.473 m (4' 10\"), weight 76.3 kg (168 lb 4.8 oz), last menstrual period 2023, SpO2 98%, not currently breastfeeding.  Gen: Comfortable, no acute distress.  Abd: Gravid, non-tender  See OB Vitals flowsheet  ASSESSMENT/PLAN:  Colten was seen today for prenatal care.    Diagnoses and all orders for this visit:    Pregnancy, unspecified gestational age  Breech presentation, single or unspecified fetus: Breech today- had ECV consult at [facility] and reportedly has ECV scheduled for this Saturday at [facility] per patient report.    Placental abnormality in third trimester: Has growth US tomorrow with MFM. Plan delivery 39 weeks.      -IUP at 37w1d:   Prenatal labs reviewed.   Fetal survey was done   GBS status is negative but GBS bacteruria in pregnancy- will need treatment..   RTC in 1 weeks or sooner with problems. Hospital form next visit, check cervix    Visit completed along with assistance of [employer] .    Flor Stoddard MD    "
No

## 2024-07-19 ENCOUNTER — OFFICE VISIT (OUTPATIENT)
Dept: MATERNAL FETAL MEDICINE | Facility: HOSPITAL | Age: 29
End: 2024-07-19
Attending: STUDENT IN AN ORGANIZED HEALTH CARE EDUCATION/TRAINING PROGRAM
Payer: COMMERCIAL

## 2024-07-19 ENCOUNTER — ANCILLARY PROCEDURE (OUTPATIENT)
Dept: ULTRASOUND IMAGING | Facility: HOSPITAL | Age: 29
End: 2024-07-19
Attending: STUDENT IN AN ORGANIZED HEALTH CARE EDUCATION/TRAINING PROGRAM
Payer: COMMERCIAL

## 2024-07-19 DIAGNOSIS — O43.103 PLACENTAL ABNORMALITY IN THIRD TRIMESTER: ICD-10-CM

## 2024-07-19 DIAGNOSIS — O43.103 PLACENTAL ABNORMALITY IN THIRD TRIMESTER: Primary | ICD-10-CM

## 2024-07-19 PROCEDURE — 76816 OB US FOLLOW-UP PER FETUS: CPT

## 2024-07-19 PROCEDURE — 99212 OFFICE O/P EST SF 10 MIN: CPT | Mod: 25 | Performed by: STUDENT IN AN ORGANIZED HEALTH CARE EDUCATION/TRAINING PROGRAM

## 2024-07-19 PROCEDURE — 76816 OB US FOLLOW-UP PER FETUS: CPT | Mod: 26 | Performed by: STUDENT IN AN ORGANIZED HEALTH CARE EDUCATION/TRAINING PROGRAM

## 2024-07-19 NOTE — NURSING NOTE
Colten Meade is a  at 37w2d who presents to [facility] for a follow-up ultrasound.  I sandra Awad  utilized for today's appt.  Pt reports positive fetal movement. Pt denies bldg/lof/change in discharge, contractions, headache, vision changes, chest pain/SOB or edema. SBAR given to Dr. JESSICA Marroquin, see note in Epic.     Blanca Kothari RN

## 2024-07-19 NOTE — PROGRESS NOTES
The patient was seen for an ultrasound in the [facility] Maternal-Fetal Medicine Center today.  For a detailed report of the ultrasound examination, please see the ultrasound report which can be found under the imaging tab.     If you have questions regarding today's evaluation or if we can be of further service, please contact the Maternal-Fetal Medicine Center.    Darlin Marroquin MD  Maternal Fetal Medicine

## 2024-07-20 ENCOUNTER — HOSPITAL ENCOUNTER (OUTPATIENT)
Facility: CLINIC | Age: 29
Discharge: HOME | End: 2024-07-20
Attending: FAMILY MEDICINE | Admitting: OBSTETRICS & GYNECOLOGY
Payer: COMMERCIAL

## 2024-07-20 ENCOUNTER — HOSPITAL ENCOUNTER (OUTPATIENT)
Facility: CLINIC | Age: 29
End: 2024-07-20
Admitting: OBSTETRICS & GYNECOLOGY
Payer: MEDICAID

## 2024-07-20 VITALS — TEMPERATURE: 98.2 F | HEART RATE: 73 BPM | RESPIRATION RATE: 16 BRPM

## 2024-07-20 PROCEDURE — 59412 ANTEPARTUM MANIPULATION: CPT

## 2024-07-20 RX ORDER — LIDOCAINE 40 MG/G
CREAM TOPICAL
Status: DISCONTINUED | OUTPATIENT
Start: 2024-07-20 | End: 2024-07-20 | Stop reason: HOSPADM

## 2024-07-20 ASSESSMENT — ACTIVITIES OF DAILY LIVING (ADL)
ADLS_ACUITY_SCORE: 35
ADLS_ACUITY_SCORE: 18

## 2024-07-20 NOTE — PROCEDURES
PROCEDURE NOTE - EXTERNAL CEPHALIC VERSION , bedside ultrasound    NAME:  Colten [facility]   MRN: [id number]   : 1995     DATE OF SERVICE: 2024     PREOPERATIVE DIAGNOSIS: Intrauterine pregnancy at 37w3d, breech position    POSTOPERATIVE DIAGNOSIS: Intrauterine pregnancy at 37w3d, cephalic presentation    PROCEDURE: successful external cephalic version    SURGEON:  Boby Wren MD     ANESTHESIA: none    COMPLICATIONS: none    DISPOSITION: reactive nst following, stable to home    PREOPERATIVE CONSENT: The risks of the procedure were discussed with the patient including but not exclusive to SROM, labor, fetal maternal hemorrhage, need for emergent  or non-urgent  and  labor.  Informed consent was obtained.    PROCEDURE:  After obtaining informed consent, fetal heart tones were checked and were stable with a reactive NST. Ultrasound verified breech position and location of placenta. The fetal back was identified, as well as the location of the head.  Terbutaline 0.25 mg IM was given by the RN.  The fetus was then gently rotated by putting gentle pressure in an upward motion on the buttocks, removing them from the pelvis.  Simultaneously gentle pressure was applied to the fetal occiput and the fetus was rotated from a breech position to the cephalic position in a clockwise manner.  The patient tolerated this procedure well.  The patient remained in the room in stable condition for futher monitoring following the procedure.      Boby Wren MD       CC: Flor Stoddard, Boby Wren MD

## 2024-07-20 NOTE — PROCEDURES
NST Procedure note    Date: 7/20/2024    Indication: Post External Version    Procedure: Fetal non-stress test    Results: Reactive    Boby Wren MD  7/20/2024 1215 PM

## 2024-07-20 NOTE — PROGRESS NOTES
, EDC 24, presents to [facility] triage department for external cephalic version with Dr. Wren. [employer]  utilized through language line. Consent obtained. FHR monitor shows category 1 tracing with accels, no decels noted. No contractions noted. Dr. Wren successfully turned baby from breech to vertex. FHR has continued to be a category 1 tracing with accels, and no decels. Plan to discharge patient to home to follow up in  clinic as previously scheduled.

## 2024-07-20 NOTE — PROCEDURES
NST Procedure note    Date: 7/20/2024    Indication: Prior to External cephalic Version    Procedure: Fetal non-stress test    Results: Lula Wren MD  7/20/2024 1030 AM

## 2024-07-25 ENCOUNTER — PRENATAL OFFICE VISIT (OUTPATIENT)
Dept: FAMILY MEDICINE | Facility: CLINIC | Age: 29
End: 2024-07-25
Payer: COMMERCIAL

## 2024-07-25 ENCOUNTER — PATIENT OUTREACH (OUTPATIENT)
Dept: CARE COORDINATION | Facility: CLINIC | Age: 29
End: 2024-07-25

## 2024-07-25 VITALS
OXYGEN SATURATION: 98 % | RESPIRATION RATE: 16 BRPM | TEMPERATURE: 98.2 F | HEIGHT: 58 IN | SYSTOLIC BLOOD PRESSURE: 107 MMHG | HEART RATE: 78 BPM | BODY MASS INDEX: 34.91 KG/M2 | WEIGHT: 166.3 LBS | DIASTOLIC BLOOD PRESSURE: 71 MMHG

## 2024-07-25 DIAGNOSIS — Z71.89 OTHER SPECIFIED COUNSELING: Primary | Chronic | ICD-10-CM

## 2024-07-25 DIAGNOSIS — Z34.90 PREGNANCY, UNSPECIFIED GESTATIONAL AGE: Primary | ICD-10-CM

## 2024-07-25 DIAGNOSIS — O43.103 PLACENTAL ABNORMALITY IN THIRD TRIMESTER: ICD-10-CM

## 2024-07-25 PROCEDURE — 99207 PR PRENATAL VISIT: CPT | Performed by: FAMILY MEDICINE

## 2024-07-25 NOTE — PROGRESS NOTES
Clinic Care Coordination Contact  Community Health Worker Initial Outreach    CHW Initial Information Gathering:  Referral Source: PCP  Preferred Hospital: [facility]  [phone]  Preferred Urgent Care: [facility] [place], [phone]  Current living arrangement:: I live in a private home with family  Type of residence:: Apartment  Community Resources: None  Supplies Currently Used at Home: None  Equipment Currently Used at Home: none  Informal Support system:: Family  No PCP office visit in Past Year: No  Transportation means:: Accessible car, Family  CHW Additional Questions  If ED/Hospital discharge, follow-up appointment scheduled as recommended?: N/A  Medication changes made following ED/Hospital discharge?: N/A  MyChart active?: No  Patient agreeable to assistance with activating MyChart?: No    Patient accepts CC: No, patient only needs helps notifying [place] after citizenship. Patient will be sent Care Coordination introduction letter for future reference.

## 2024-07-25 NOTE — PROGRESS NOTES
"SUBJECTIVE:   at 38w1d. Estimated Date of Delivery: Aug 7, 2024.  She is doing well. She denies vaginal bleeding, leakage of fluid, or urinary symptoms. Fetal movement is present. She is not having contractions.  Concerns: had successful ECV with Dr. Wren  ROS  Negative except as noted above in HPI  OBJECTIVE:  Blood pressure 107/71, pulse 78, temperature 98.2  F (36.8  C), temperature source Oral, resp. rate 16, height 1.473 m (4' 10\"), weight 75.4 kg (166 lb 4.8 oz), last menstrual period 2023, SpO2 98%, not currently breastfeeding.  Gen: Comfortable, no acute distress.  Abd: Gravid, non-tender  See OB Vitals flowsheet  SVE: /2  ASSESSMENT/PLAN:  Colten was seen today for prenatal care.    Diagnoses and all orders for this visit:    Pregnancy, unspecified gestational age  -     Primary Care - Care Coordination Referral; Future- had legal name change and has new ID card but has not changed this with health insurance- would like assistance.    Placental abnormality in third trimester: Co-managed MFM- recommended induction 39 weeks.      -IUP at 38w1d:   Prenatal labs reviewed   Gave hospital form- reviewed induction plan for 39 weeks. SVE borderline favorable today- will need to recheck next week.  RTC in 1 weeks or sooner with problems.    Visit completed along with assistance of [employer] .    Flor Stoddard MD    "

## 2024-07-29 ENCOUNTER — PATIENT OUTREACH (OUTPATIENT)
Dept: CARE COORDINATION | Facility: CLINIC | Age: 29
End: 2024-07-29
Payer: COMMERCIAL

## 2024-08-02 ENCOUNTER — PRENATAL OFFICE VISIT (OUTPATIENT)
Dept: FAMILY MEDICINE | Facility: CLINIC | Age: 29
End: 2024-08-02
Payer: MEDICAID

## 2024-08-02 VITALS
WEIGHT: 165.13 LBS | OXYGEN SATURATION: 98 % | SYSTOLIC BLOOD PRESSURE: 106 MMHG | TEMPERATURE: 98 F | HEIGHT: 58 IN | BODY MASS INDEX: 34.66 KG/M2 | RESPIRATION RATE: 20 BRPM | DIASTOLIC BLOOD PRESSURE: 71 MMHG | HEART RATE: 85 BPM

## 2024-08-02 DIAGNOSIS — O43.103 PLACENTAL ABNORMALITY IN THIRD TRIMESTER: ICD-10-CM

## 2024-08-02 DIAGNOSIS — Z34.90 PREGNANCY, UNSPECIFIED GESTATIONAL AGE: Primary | ICD-10-CM

## 2024-08-02 PROCEDURE — 99207 PR PRENATAL VISIT: CPT | Performed by: FAMILY MEDICINE

## 2024-08-02 NOTE — PROGRESS NOTES
"SUBJECTIVE:   at 39w2d. Estimated Date of Delivery: Aug 7, 2024.  She is doing well. She denies vaginal bleeding, leakage of fluid, or urinary symptoms. Fetal movement is present. She is not having contractions.  Concerns: prefers to wait until her due date for induction  ROS  Negative except as noted above in HPI  OBJECTIVE:  Blood pressure 106/71, pulse 85, temperature 98  F (36.7  C), temperature source Oral, resp. rate 20, height 1.473 m (4' 10\"), weight 74.9 kg (165 lb 2 oz), last menstrual period 2023, SpO2 98%, not currently breastfeeding.  Gen: Comfortable, no acute distress.  Abd: Gravid, non-tender  See OB Vitals flowsheet.  ASSESSMENT/PLAN:  Colten was seen today for prenatal care.    Diagnoses and all orders for this visit:    Pregnancy, unspecified gestational age    Placental abnormality in third trimester      -IUP at 39w2d:   Prenatal labs reviewed.   Plan for induction 39 weeks per M due to large placental lakes/infarcts- patient prefers 40w0d- reviewed importance of kick counts and will schedule pitocin induction on 24. [place] 6 today.    Visit completed along with assistance of [employer] .    Flor Stoddard MD    "

## 2024-08-07 ENCOUNTER — OFFICE VISIT (OUTPATIENT)
Dept: INTERPRETER SERVICES | Facility: CLINIC | Age: 29
End: 2024-08-07

## 2024-08-07 ENCOUNTER — HOSPITAL ENCOUNTER (INPATIENT)
Facility: HOSPITAL | Age: 29
LOS: 1 days | Discharge: HOME | End: 2024-08-08
Attending: FAMILY MEDICINE | Admitting: FAMILY MEDICINE
Payer: COMMERCIAL

## 2024-08-07 DIAGNOSIS — O09.90 HIGH-RISK PREGNANCY, UNSPECIFIED TRIMESTER: ICD-10-CM

## 2024-08-07 PROBLEM — Z36.89 ENCOUNTER FOR TRIAGE IN PREGNANT PATIENT: Status: ACTIVE | Noted: 2024-08-07

## 2024-08-07 PROBLEM — Z34.90 PREGNANCY: Status: RESOLVED | Noted: 2024-01-26 | Resolved: 2024-08-07

## 2024-08-07 PROBLEM — Z36.89 ENCOUNTER FOR TRIAGE IN PREGNANT PATIENT: Status: RESOLVED | Noted: 2024-08-07 | Resolved: 2024-08-07

## 2024-08-07 LAB
ABO/RH(D): NORMAL
ALT SERPL W P-5'-P-CCNC: <5 U/L (ref 0–50)
ANTIBODY SCREEN: NEGATIVE
AST SERPL W P-5'-P-CCNC: 23 U/L (ref 0–45)
CREAT SERPL-MCNC: 0.58 MG/DL (ref 0.51–0.95)
EGFRCR SERPLBLD CKD-EPI 2021: >90 ML/MIN/1.73M2
ERYTHROCYTE [DISTWIDTH] IN BLOOD BY AUTOMATED COUNT: 14 % (ref 10–15)
HCT VFR BLD AUTO: 38.7 % (ref 35–47)
HGB BLD-MCNC: 12.3 G/DL (ref 11.7–15.7)
HGB BLD-MCNC: 12.8 G/DL (ref 11.7–15.7)
MCH RBC QN AUTO: 32 PG (ref 26.5–33)
MCHC RBC AUTO-ENTMCNC: 33.1 G/DL (ref 31.5–36.5)
MCV RBC AUTO: 97 FL (ref 78–100)
PLATELET # BLD AUTO: 176 10E3/UL (ref 150–450)
RBC # BLD AUTO: 4 10E6/UL (ref 3.8–5.2)
SPECIMEN EXPIRATION DATE: NORMAL
WBC # BLD AUTO: 8.7 10E3/UL (ref 4–11)

## 2024-08-07 PROCEDURE — 120N000001 HC R&B MED SURG/OB

## 2024-08-07 PROCEDURE — 59400 OBSTETRICAL CARE: CPT | Performed by: FAMILY MEDICINE

## 2024-08-07 PROCEDURE — 3E033VJ INTRODUCTION OF OTHER HORMONE INTO PERIPHERAL VEIN, PERCUTANEOUS APPROACH: ICD-10-PCS | Performed by: FAMILY MEDICINE

## 2024-08-07 PROCEDURE — 85014 HEMATOCRIT: CPT | Performed by: FAMILY MEDICINE

## 2024-08-07 PROCEDURE — 86780 TREPONEMA PALLIDUM: CPT | Performed by: FAMILY MEDICINE

## 2024-08-07 PROCEDURE — 36415 COLL VENOUS BLD VENIPUNCTURE: CPT | Performed by: FAMILY MEDICINE

## 2024-08-07 PROCEDURE — 85018 HEMOGLOBIN: CPT | Performed by: FAMILY MEDICINE

## 2024-08-07 PROCEDURE — 84450 TRANSFERASE (AST) (SGOT): CPT | Performed by: FAMILY MEDICINE

## 2024-08-07 PROCEDURE — 250N000009 HC RX 250: Performed by: FAMILY MEDICINE

## 2024-08-07 PROCEDURE — 84460 ALANINE AMINO (ALT) (SGPT): CPT | Performed by: FAMILY MEDICINE

## 2024-08-07 PROCEDURE — 82565 ASSAY OF CREATININE: CPT | Performed by: FAMILY MEDICINE

## 2024-08-07 PROCEDURE — 0HQ9XZZ REPAIR PERINEUM SKIN, EXTERNAL APPROACH: ICD-10-PCS | Performed by: FAMILY MEDICINE

## 2024-08-07 PROCEDURE — 722N000001 HC LABOR CARE VAGINAL DELIVERY SINGLE

## 2024-08-07 PROCEDURE — 86900 BLOOD TYPING SEROLOGIC ABO: CPT | Performed by: FAMILY MEDICINE

## 2024-08-07 PROCEDURE — 250N000011 HC RX IP 250 OP 636: Performed by: FAMILY MEDICINE

## 2024-08-07 PROCEDURE — T1013 SIGN LANG/ORAL INTERPRETER: HCPCS | Mod: U3

## 2024-08-07 PROCEDURE — 10907ZC DRAINAGE OF AMNIOTIC FLUID, THERAPEUTIC FROM PRODUCTS OF CONCEPTION, VIA NATURAL OR ARTIFICIAL OPENING: ICD-10-PCS | Performed by: FAMILY MEDICINE

## 2024-08-07 RX ORDER — MISOPROSTOL 200 UG/1
400 TABLET ORAL
Status: DISCONTINUED | OUTPATIENT
Start: 2024-08-07 | End: 2024-08-07 | Stop reason: HOSPADM

## 2024-08-07 RX ORDER — PROCHLORPERAZINE MALEATE 10 MG
10 TABLET ORAL EVERY 6 HOURS PRN
Status: DISCONTINUED | OUTPATIENT
Start: 2024-08-07 | End: 2024-08-07 | Stop reason: HOSPADM

## 2024-08-07 RX ORDER — ONDANSETRON 4 MG/1
4 TABLET, ORALLY DISINTEGRATING ORAL EVERY 6 HOURS PRN
Status: DISCONTINUED | OUTPATIENT
Start: 2024-08-07 | End: 2024-08-07 | Stop reason: HOSPADM

## 2024-08-07 RX ORDER — DOCUSATE SODIUM 100 MG/1
100 CAPSULE, LIQUID FILLED ORAL DAILY
Status: DISCONTINUED | OUTPATIENT
Start: 2024-08-07 | End: 2024-08-08 | Stop reason: HOSPADM

## 2024-08-07 RX ORDER — ACETAMINOPHEN 325 MG/1
650 TABLET ORAL EVERY 4 HOURS PRN
Status: DISCONTINUED | OUTPATIENT
Start: 2024-08-07 | End: 2024-08-08 | Stop reason: HOSPADM

## 2024-08-07 RX ORDER — TRANEXAMIC ACID 10 MG/ML
1 INJECTION, SOLUTION INTRAVENOUS EVERY 30 MIN PRN
Status: DISCONTINUED | OUTPATIENT
Start: 2024-08-07 | End: 2024-08-07 | Stop reason: HOSPADM

## 2024-08-07 RX ORDER — LIDOCAINE 40 MG/G
CREAM TOPICAL
Status: DISCONTINUED | OUTPATIENT
Start: 2024-08-07 | End: 2024-08-07 | Stop reason: HOSPADM

## 2024-08-07 RX ORDER — PROCHLORPERAZINE 25 MG
25 SUPPOSITORY, RECTAL RECTAL EVERY 12 HOURS PRN
Status: DISCONTINUED | OUTPATIENT
Start: 2024-08-07 | End: 2024-08-07 | Stop reason: HOSPADM

## 2024-08-07 RX ORDER — KETOROLAC TROMETHAMINE 30 MG/ML
30 INJECTION, SOLUTION INTRAMUSCULAR; INTRAVENOUS
Status: DISCONTINUED | OUTPATIENT
Start: 2024-08-07 | End: 2024-08-08 | Stop reason: HOSPADM

## 2024-08-07 RX ORDER — NALOXONE HYDROCHLORIDE 0.4 MG/ML
0.4 INJECTION, SOLUTION INTRAMUSCULAR; INTRAVENOUS; SUBCUTANEOUS
Status: DISCONTINUED | OUTPATIENT
Start: 2024-08-07 | End: 2024-08-07 | Stop reason: HOSPADM

## 2024-08-07 RX ORDER — OXYTOCIN 10 [USP'U]/ML
10 INJECTION, SOLUTION INTRAMUSCULAR; INTRAVENOUS
Status: DISCONTINUED | OUTPATIENT
Start: 2024-08-07 | End: 2024-08-08 | Stop reason: HOSPADM

## 2024-08-07 RX ORDER — METOCLOPRAMIDE HYDROCHLORIDE 5 MG/ML
10 INJECTION INTRAMUSCULAR; INTRAVENOUS EVERY 6 HOURS PRN
Status: DISCONTINUED | OUTPATIENT
Start: 2024-08-07 | End: 2024-08-07 | Stop reason: HOSPADM

## 2024-08-07 RX ORDER — METOCLOPRAMIDE 10 MG/1
10 TABLET ORAL EVERY 6 HOURS PRN
Status: DISCONTINUED | OUTPATIENT
Start: 2024-08-07 | End: 2024-08-07 | Stop reason: HOSPADM

## 2024-08-07 RX ORDER — OXYTOCIN/0.9 % SODIUM CHLORIDE 30/500 ML
340 PLASTIC BAG, INJECTION (ML) INTRAVENOUS CONTINUOUS PRN
Status: DISCONTINUED | OUTPATIENT
Start: 2024-08-07 | End: 2024-08-08 | Stop reason: HOSPADM

## 2024-08-07 RX ORDER — LOPERAMIDE HCL 2 MG
4 CAPSULE ORAL
Status: DISCONTINUED | OUTPATIENT
Start: 2024-08-07 | End: 2024-08-08 | Stop reason: HOSPADM

## 2024-08-07 RX ORDER — NALOXONE HYDROCHLORIDE 0.4 MG/ML
0.2 INJECTION, SOLUTION INTRAMUSCULAR; INTRAVENOUS; SUBCUTANEOUS
Status: DISCONTINUED | OUTPATIENT
Start: 2024-08-07 | End: 2024-08-07 | Stop reason: HOSPADM

## 2024-08-07 RX ORDER — ACETAMINOPHEN 325 MG/1
650 TABLET ORAL EVERY 4 HOURS PRN
Status: DISCONTINUED | OUTPATIENT
Start: 2024-08-07 | End: 2024-08-07 | Stop reason: HOSPADM

## 2024-08-07 RX ORDER — LOPERAMIDE HCL 2 MG
2 CAPSULE ORAL
Status: DISCONTINUED | OUTPATIENT
Start: 2024-08-07 | End: 2024-08-08 | Stop reason: HOSPADM

## 2024-08-07 RX ORDER — PENICILLIN G POTASSIUM 5000000 [IU]/1
5 INJECTION, POWDER, FOR SOLUTION INTRAMUSCULAR; INTRAVENOUS ONCE
Status: COMPLETED | OUTPATIENT
Start: 2024-08-07 | End: 2024-08-07

## 2024-08-07 RX ORDER — LOPERAMIDE HCL 2 MG
4 CAPSULE ORAL
Status: DISCONTINUED | OUTPATIENT
Start: 2024-08-07 | End: 2024-08-07 | Stop reason: HOSPADM

## 2024-08-07 RX ORDER — MISOPROSTOL 200 UG/1
800 TABLET ORAL
Status: DISCONTINUED | OUTPATIENT
Start: 2024-08-07 | End: 2024-08-08 | Stop reason: HOSPADM

## 2024-08-07 RX ORDER — MISOPROSTOL 200 UG/1
800 TABLET ORAL
Status: DISCONTINUED | OUTPATIENT
Start: 2024-08-07 | End: 2024-08-07 | Stop reason: HOSPADM

## 2024-08-07 RX ORDER — PENICILLIN G 3000000 [IU]/50ML
3 INJECTION, SOLUTION INTRAVENOUS EVERY 4 HOURS
Status: DISCONTINUED | OUTPATIENT
Start: 2024-08-07 | End: 2024-08-07 | Stop reason: HOSPADM

## 2024-08-07 RX ORDER — OXYTOCIN/0.9 % SODIUM CHLORIDE 30/500 ML
100-340 PLASTIC BAG, INJECTION (ML) INTRAVENOUS CONTINUOUS PRN
Status: DISCONTINUED | OUTPATIENT
Start: 2024-08-07 | End: 2024-08-08 | Stop reason: HOSPADM

## 2024-08-07 RX ORDER — METHYLERGONOVINE MALEATE 0.2 MG/ML
200 INJECTION INTRAVENOUS
Status: DISCONTINUED | OUTPATIENT
Start: 2024-08-07 | End: 2024-08-07 | Stop reason: HOSPADM

## 2024-08-07 RX ORDER — IBUPROFEN 800 MG/1
800 TABLET, FILM COATED ORAL EVERY 6 HOURS PRN
Status: DISCONTINUED | OUTPATIENT
Start: 2024-08-07 | End: 2024-08-08 | Stop reason: HOSPADM

## 2024-08-07 RX ORDER — LOPERAMIDE HCL 2 MG
2 CAPSULE ORAL
Status: DISCONTINUED | OUTPATIENT
Start: 2024-08-07 | End: 2024-08-07 | Stop reason: HOSPADM

## 2024-08-07 RX ORDER — MISOPROSTOL 200 UG/1
400 TABLET ORAL
Status: DISCONTINUED | OUTPATIENT
Start: 2024-08-07 | End: 2024-08-08 | Stop reason: HOSPADM

## 2024-08-07 RX ORDER — BISACODYL 10 MG
10 SUPPOSITORY, RECTAL RECTAL DAILY PRN
Status: DISCONTINUED | OUTPATIENT
Start: 2024-08-07 | End: 2024-08-08 | Stop reason: HOSPADM

## 2024-08-07 RX ORDER — HYDROCORTISONE 25 MG/G
CREAM TOPICAL 3 TIMES DAILY PRN
Status: DISCONTINUED | OUTPATIENT
Start: 2024-08-07 | End: 2024-08-08 | Stop reason: HOSPADM

## 2024-08-07 RX ORDER — CARBOPROST TROMETHAMINE 250 UG/ML
250 INJECTION, SOLUTION INTRAMUSCULAR
Status: DISCONTINUED | OUTPATIENT
Start: 2024-08-07 | End: 2024-08-08 | Stop reason: HOSPADM

## 2024-08-07 RX ORDER — SODIUM CHLORIDE, SODIUM LACTATE, POTASSIUM CHLORIDE, CALCIUM CHLORIDE 600; 310; 30; 20 MG/100ML; MG/100ML; MG/100ML; MG/100ML
INJECTION, SOLUTION INTRAVENOUS CONTINUOUS PRN
Status: DISCONTINUED | OUTPATIENT
Start: 2024-08-07 | End: 2024-08-07 | Stop reason: HOSPADM

## 2024-08-07 RX ORDER — CARBOPROST TROMETHAMINE 250 UG/ML
250 INJECTION, SOLUTION INTRAMUSCULAR
Status: DISCONTINUED | OUTPATIENT
Start: 2024-08-07 | End: 2024-08-07 | Stop reason: HOSPADM

## 2024-08-07 RX ORDER — CITRIC ACID/SODIUM CITRATE 334-500MG
30 SOLUTION, ORAL ORAL
Status: DISCONTINUED | OUTPATIENT
Start: 2024-08-07 | End: 2024-08-07 | Stop reason: HOSPADM

## 2024-08-07 RX ORDER — OXYTOCIN 10 [USP'U]/ML
10 INJECTION, SOLUTION INTRAMUSCULAR; INTRAVENOUS
Status: DISCONTINUED | OUTPATIENT
Start: 2024-08-07 | End: 2024-08-07 | Stop reason: HOSPADM

## 2024-08-07 RX ORDER — OXYTOCIN/0.9 % SODIUM CHLORIDE 30/500 ML
1-24 PLASTIC BAG, INJECTION (ML) INTRAVENOUS CONTINUOUS
Status: DISCONTINUED | OUTPATIENT
Start: 2024-08-07 | End: 2024-08-07 | Stop reason: HOSPADM

## 2024-08-07 RX ORDER — MODIFIED LANOLIN
OINTMENT (GRAM) TOPICAL
Status: DISCONTINUED | OUTPATIENT
Start: 2024-08-07 | End: 2024-08-08 | Stop reason: HOSPADM

## 2024-08-07 RX ORDER — OXYTOCIN/0.9 % SODIUM CHLORIDE 30/500 ML
340 PLASTIC BAG, INJECTION (ML) INTRAVENOUS CONTINUOUS PRN
Status: DISCONTINUED | OUTPATIENT
Start: 2024-08-07 | End: 2024-08-07 | Stop reason: HOSPADM

## 2024-08-07 RX ORDER — METHYLERGONOVINE MALEATE 0.2 MG/ML
200 INJECTION INTRAVENOUS
Status: DISCONTINUED | OUTPATIENT
Start: 2024-08-07 | End: 2024-08-08 | Stop reason: HOSPADM

## 2024-08-07 RX ORDER — ONDANSETRON 2 MG/ML
4 INJECTION INTRAMUSCULAR; INTRAVENOUS EVERY 6 HOURS PRN
Status: DISCONTINUED | OUTPATIENT
Start: 2024-08-07 | End: 2024-08-07 | Stop reason: HOSPADM

## 2024-08-07 RX ORDER — FENTANYL CITRATE 50 UG/ML
100 INJECTION, SOLUTION INTRAMUSCULAR; INTRAVENOUS
Status: DISCONTINUED | OUTPATIENT
Start: 2024-08-07 | End: 2024-08-07 | Stop reason: HOSPADM

## 2024-08-07 RX ORDER — TRANEXAMIC ACID 10 MG/ML
1 INJECTION, SOLUTION INTRAVENOUS EVERY 30 MIN PRN
Status: DISCONTINUED | OUTPATIENT
Start: 2024-08-07 | End: 2024-08-08 | Stop reason: HOSPADM

## 2024-08-07 RX ORDER — IBUPROFEN 800 MG/1
800 TABLET, FILM COATED ORAL
Status: DISCONTINUED | OUTPATIENT
Start: 2024-08-07 | End: 2024-08-08 | Stop reason: HOSPADM

## 2024-08-07 RX ADMIN — Medication 2 MILLI-UNITS/MIN: at 11:14

## 2024-08-07 RX ADMIN — PENICILLIN G POTASSIUM 5 MILLION UNITS: 5000000 POWDER, FOR SOLUTION INTRAMUSCULAR; INTRAPLEURAL; INTRATHECAL; INTRAVENOUS at 08:51

## 2024-08-07 RX ADMIN — LIDOCAINE HYDROCHLORIDE 20 ML: 10 INJECTION, SOLUTION INFILTRATION; PERINEURAL at 17:02

## 2024-08-07 RX ADMIN — PENICILLIN G 3 MILLION UNITS: 3000000 INJECTION, SOLUTION INTRAVENOUS at 16:25

## 2024-08-07 RX ADMIN — PENICILLIN G 3 MILLION UNITS: 3000000 INJECTION, SOLUTION INTRAVENOUS at 12:58

## 2024-08-07 ASSESSMENT — ACTIVITIES OF DAILY LIVING (ADL)
ADLS_ACUITY_SCORE: 18

## 2024-08-07 NOTE — PROGRESS NOTES
0730: Pt arrived for scheduled induction of labor. , at 40wks. GBS + in urine. Induction for Placental Lakes.   0735: EFM/TOCO applied.   0815: SVE: 3.5-4/50/-3, soft and mid. Liao score-7.   0830: Spoke with Dr. Stoddard. Notified of pts arrival, cervical status and uterine activity. Orders received for GBS Prophylaxis to begin. Then will initiate Pitocin for labor induction after 2 hours. In person [facility]  at bedside now.   0845: Report given to JOSSUE Quintero RN sign off

## 2024-08-07 NOTE — PROGRESS NOTES
Del note delayed entry:  Dr Stoddard at bedside and pt was complete @1646, started pushing @1648 and delivered female @1651.  Apgars 8/9

## 2024-08-07 NOTE — PROGRESS NOTES
Pt pitocin @12mu, contx q2-3 min regularly.  Feeling more uncomfortable and feeling some rectal pressure.  SVE 8/80/-1 called Dr Stoddard and she is coming in.   Katie LEBLANC is present at bedside.

## 2024-08-07 NOTE — PLAN OF CARE
Goal Outcome Evaluation:      Plan of Care Reviewed With: patient    Overall Patient Progress: improvingOverall Patient Progress: improving      present on admission.

## 2024-08-07 NOTE — L&D DELIVERY NOTE
OB Vaginal Delivery Note    Colten Meade MRN# [id number]   Age: 29 year old YOB: 1995       GA: 40w0d  GP:   Labor Complications: None   Delivery QBL: 211 mL  Delivery Type: Vaginal, Spontaneous   ROM to Delivery Time: (Delivered) Minutes: 32  Alfred Weight:  not yet weighed at time of note   1 Minute 5 Minute 10 Minute   Apgar Totals: 8   9        NOE GREEN;CORINA ALEXANDER     Delivery Details:  Colten Meade, a 29 year old  female delivered a viable infant with apgars of 8  and 9 . She was admitted to L&D for induction due to placental abnormalities. She was 3.5 cm dilated on admission and IV penicillin was started for GBS bacteruria. She was started on IV pitocin and progressed to 8 cm. AROM performed with small amount of clear fluid and she then progressed to complete. She pushed effectively. Delivery was via vaginal, spontaneous  to a sterile field under none  anesthesia. Infant delivered in vertex  left  occiput  anterior  position. Anterior and posterior shoulders delivered with gentle downward traction, maternal pushing efforts. The cord was clamped, cut twice and 3 vessels  were noted. Cord blood was obtained in routine fashion with the following disposition: lab .     Of note, she developed some elevated blood pressures (not severe range) shortly prior to delivery. Denied any headaches, vision changes. HELLP labs were obtained postpartum.     Cord complications: none   Placenta delivered at 2024  4:58 PM . Placental disposition was Hospital disposal . Fundal massage performed and fundus found to be firm.     Episiotomy: none    Perineum, vagina, cervix were inspected, and the following lacerations were noted:   Perineal lacerations: 1st                Any lacerations were repaired in the usual fashion using 3-0 vicryl.    Excellent hemostasis was noted. Needle count correct. Infant and patient in delivery room in good and stable condition.        [facility], Female-Colten [[id number]]       Labor Event Times      Active labor onset date: 24 Onset time:  2:30 PM   Dilation complete date: 24 Complete time:  4:46 PM   Start pushing date/time: 2024 1648          Labor Events     labor?: No   steroids: None  Labor Type: Induction/Cervical ripening  Predominate monitoring during 1st stage: continuous electronic fetal monitoring     Antibiotics received during labor?: Yes  Reason for Antibiotics: GBS  Antibiotics received for GBS: Penicillin  Antibiotics Given (GBS): Greater than 4 hours prior to delivery       Rupture date/time: 24 1619   Rupture type: Artificial Rupture of Membranes  Fluid color: Clear  Fluid odor: Normal     Induction: Oxytocin  Induction date/time: 24 1114    Cervical ripening date/time:      Indications for induction: Other Pregnant Patient Indications (Comment to specify)     Augmentation: AROM       Delivery/Placenta Date and Time      Delivery Date: 24 Delivery Time:  4:51 PM   Placenta Date/Time: 2024  4:58 PM  Oxytocin given at the time of delivery: after delivery of baby  Delivering clinician: Flor Stoddard MD   Other personnel present at delivery:  Provider Role   Flor Schmitz RN Hamel, Lily J, RN              Vaginal Counts       Initial count performed by 2 team members:  Two Team Members   barby schmitz         [place] Suture Needles Sponges (RETIRED) Instruments   Initial counts 0 0 5    Added to count 1 1 0    Relief counts       Final counts 1 1 5            Placed during labor Accounted for at the end of labor   FSE No NA   IUPC No NA   Cervidil No NA                  Final count performed by 2 team members:  Two Team Members   barby schmitz      Final count correct?: Yes  Pre-Birth Team Brief: Complete  Post-Birth Team Debrief: Complete       Apgars    Living status: Living   1 Minute 5 Minute 10 Minute 15 Minute 20 Minute   Skin color: 0  1       Heart rate: 2  2       Reflex irritability: 2  2       Muscle  tone: 2  2       Respiratory effort: 2  2       Total: 8  9       Apgars assigned by: FLOR SCHMITZ RN       Cord      Vessels: 3 Vessels    Cord Complications: None               Cord Blood Disposition: Lab    Gases Sent?: No    Delayed cord clamping?: Yes    Cord Clamping Delay (seconds):  seconds    Stem cell collection?: No           [place] Resuscitation    Methods: None  Output in Delivery Room: Stool       [place] Measurements    Output in delivery room: Stool       Skin to Skin and Feeding Plan      Skin to skin initiation date/time: 1841    Skin to skin with: Mother  Skin to skin end date/time:            Labor Events and Shoulder Dystocia    Fetal Tracing Prior to Delivery: Category 1  Shoulder dystocia present?: Neg       Delivery (Maternal) (Provider to Complete) ([id number])    Episiotomy: None  Perineal lacerations: 1st Repaired?: Yes   Repair suture: 3-0 Vicryl  Genital tract inspection done: Pos       Blood Loss  Mother: Colten Meade #[id number]     Start of Mother's Information      Delivery Blood Loss  24 1430 - 24 1733      Delivery QBL (mL) Hospital Encounter 211 mL    Total  211 mL               End of Mother's Information  Mother: Colten Meade #[id number]                Delivery - Provider to Complete ([id number])    Delivering clinician: Flor Stoddard MD  Delivery Type (Choose the 1 that will go to the Birth History): Vaginal, Spontaneous                         Other personnel:  Provider Role   Flor Schmitz RN Hamel, Lily J, RN                     Placenta    Date/Time: 2024  4:58 PM  Removal: Spontaneous  Disposition: Hospital disposal             Anesthesia    Method: None                    Presentation and Position    Presentation: Vertex    Position: Left Occiput Anterior                     Flor Stoddard MD

## 2024-08-08 ENCOUNTER — MEDICAL CORRESPONDENCE (OUTPATIENT)
Dept: HEALTH INFORMATION MANAGEMENT | Facility: CLINIC | Age: 29
End: 2024-08-08
Payer: COMMERCIAL

## 2024-08-08 VITALS
WEIGHT: 156.09 LBS | RESPIRATION RATE: 16 BRPM | SYSTOLIC BLOOD PRESSURE: 129 MMHG | DIASTOLIC BLOOD PRESSURE: 63 MMHG | HEART RATE: 85 BPM | BODY MASS INDEX: 32.76 KG/M2 | HEIGHT: 58 IN | TEMPERATURE: 98 F | OXYGEN SATURATION: 96 %

## 2024-08-08 LAB — T PALLIDUM AB SER QL: NONREACTIVE

## 2024-08-08 PROCEDURE — 99207 PR SUBSEQUENT HOSPITAL CARE FOR MOTHER, 15 MINUTES: CPT | Performed by: FAMILY MEDICINE

## 2024-08-08 PROCEDURE — 250N000013 HC RX MED GY IP 250 OP 250 PS 637: Performed by: FAMILY MEDICINE

## 2024-08-08 RX ORDER — IBUPROFEN 600 MG/1
600 TABLET, FILM COATED ORAL EVERY 6 HOURS PRN
Qty: 30 TABLET | Refills: 0 | Status: SHIPPED | OUTPATIENT
Start: 2024-08-08 | End: 2024-08-22

## 2024-08-08 RX ORDER — DOCUSATE SODIUM 100 MG/1
100 CAPSULE, LIQUID FILLED ORAL 2 TIMES DAILY PRN
Qty: 28 CAPSULE | Refills: 0 | Status: SHIPPED | OUTPATIENT
Start: 2024-08-08 | End: 2024-09-07

## 2024-08-08 RX ADMIN — DOCUSATE SODIUM 100 MG: 100 CAPSULE, LIQUID FILLED ORAL at 08:10

## 2024-08-08 RX ADMIN — IBUPROFEN 800 MG: 800 TABLET ORAL at 00:10

## 2024-08-08 RX ADMIN — ACETAMINOPHEN 650 MG: 325 TABLET ORAL at 08:10

## 2024-08-08 RX ADMIN — IBUPROFEN 800 MG: 800 TABLET ORAL at 08:10

## 2024-08-08 ASSESSMENT — ACTIVITIES OF DAILY LIVING (ADL)
ADLS_ACUITY_SCORE: 18

## 2024-08-08 NOTE — PLAN OF CARE
Problem: Adult Inpatient Plan of Care  Goal: Absence of Hospital-Acquired Illness or Injury  Outcome: Progressing  Intervention: Prevent Skin Injury  Recent Flowsheet Documentation  Taken 8/8/2024 0810 by Alycia Fitzpatrick RN  Body Position: position changed independently  Intervention: Prevent Infection  Recent Flowsheet Documentation  Taken 8/8/2024 0810 by Alycia Fitzpatrick RN  Infection Prevention:   environmental surveillance performed   equipment surfaces disinfected   hand hygiene promoted   personal protective equipment utilized   rest/sleep promoted     Problem: Adult Inpatient Plan of Care  Goal: Optimal Comfort and Wellbeing  Outcome: Progressing  Intervention: Monitor Pain and Promote Comfort  Recent Flowsheet Documentation  Taken 8/8/2024 0810 by Alycia Fitzpatrick RN  Pain Management Interventions: medication (see MAR)  Intervention: Provide Person-Centered Care  Recent Flowsheet Documentation  Taken 8/8/2024 0810 by Alycia Fitzpatrick RN  Trust Relationship/Rapport: ( utilized)   care explained   emotional support provided   choices provided   empathic listening provided   questions answered   reassurance provided   thoughts/feelings acknowledged   questions encouraged   other (see comments)     Problem: Postpartum (Vaginal Delivery)  Goal: Successful Parent Role Transition  Outcome: Progressing  Intervention: Support Parent Role Transition  Recent Flowsheet Documentation  Taken 8/8/2024 0810 by Alycia Fitzpatrick RN  Supportive Measures:   active listening utilized   decision-making supported   goal-setting facilitated   problem-solving facilitated   self-care encouraged  Parent-Child Attachment Promotion:   caring behavior modeled   cue recognition promoted   interaction encouraged   parent/caregiver presence encouraged   participation in care promoted   rooming-in promoted   strengths emphasized    RN utilized  for assessments and education. Patient assessments and vitals are WDL with exception of  one elevated AM pressure which resolved with next check. Pt denies headache, visual changes, dizziness, and epigastric pain. Fundus firm without massage. Pain has been adequately controlled by tylenol and ibuprofen. Spouse is intermittently at bedside and supportive of pt. Pt and spouse are attentive to infant and active in cares. Pt is breast and formula feeding baby q2-3 hours. Was able to see lactation today - view lactation note. Pt was educated on pumping with supplementation - declined pumping during hospital stay. Patient is up ad luba. Tolerating fluids and foods - voiding without difficulty. PPMA turned in with a score of 0. Pt states she is legally  - no ROP needed    Goal Outcome Evaluation:      Plan of Care Reviewed With: patient    Overall Patient Progress: improvingOverall Patient Progress: improving    Outcome Evaluation: Assessments and vitals are WDL with exception of 1 higher BP - resolved. Pt denies headache, visual changes, or epigastric pain. Pt is up ad luba, voiding without difficulty. Desires 24 hr d/c.

## 2024-08-08 NOTE — PLAN OF CARE
Goal Outcome Evaluation:  NVD at 1651. Patient denies pain and has had 2 spontaneous voids greater than 200 ml.      Plan of Care Reviewed With: patient    Overall Patient Progress: improvingOverall Patient Progress: improving    Outcome Evaluation: Transferring to postpartum

## 2024-08-08 NOTE — DISCHARGE SUMMARY
Maternal Discharge Summary  [facility]  Date of Service: 2024    Name      Colten Meade         1995  MRN       [id number]  PCP        Flor He at [facility], [phone].    ________________________________________________________________________    Assessment:  Colten Meade is a 29 year old now  s/p vaginal, spontaneous  at 40w0d on 24.  Induced for large placental lake..      Discharge Plan:   Discharge to Home. Condition at Discharge:  stable  Physical activity: Regular.  Diet:  Regular.  Home care nurse: declined by patient.  Lactation clinic appointment:  prn .  Contraception plan: undecided, will follow up at postpartum visit.  Follow up with Dr. Flor Stoddard  in 2 weeks and 6 weeks for routine postpartum care.  Future Appointments   Date Time Provider Department Center   2024 10:40 AM Flor Stoddard MD [facility]           Review of your medicines        START taking        Dose / Directions   docusate sodium 100 MG capsule  Commonly known as: COLACE      Dose: 100 mg  Take 1 capsule (100 mg) by mouth 2 times daily as needed for constipation  Quantity: 28 capsule  Refills: 0     ibuprofen 600 MG tablet  Commonly known as: ADVIL/MOTRIN      Dose: 600 mg  Take 1 tablet (600 mg) by mouth every 6 hours as needed for moderate pain  Quantity: 30 tablet  Refills: 0            CONTINUE these medicines which have NOT CHANGED        Dose / Directions   acetaminophen 500 MG tablet  Commonly known as: TYLENOL  Used for: Chronic left-sided low back pain with left-sided sciatica      Dose: 500-1,000 mg  Take 1-2 tablets (500-1,000 mg) by mouth every 6 hours as needed for mild pain  Quantity: 100 tablet  Refills: 1     Prenatal Vitamin 27-0.8 MG Tabs  Used for: Pregnancy, unspecified gestational age      Dose: 1 tablet  Take 1 tablet by mouth daily  Quantity: 90 tablet  Refills: 3               Where to get your medicines         These medications were sent to [facility] [address] [facility]  [address] [place] [id number]      Phone: [phone]   docusate sodium 100 MG capsule  ibuprofen 600 MG tablet        ________________________________________________________________________    Admission Date:  2024  Discharge Date:  2024  Delivery Date:  24  Gestational Age at Delivery: 40w0d    Principal Diagnosis: Labor and delivery  Delivery type: Vaginal, Spontaneous     Principal Problem:     (normal spontaneous vaginal delivery)  Active Problems:    Placental abnormality in third trimester    High-risk pregnancy, unspecified trimester      Subjective:  Patient denies headache, dizziness, dyspnea, and leg pain. Ambulating and eating.    Discharge Exam:  Patient Vitals for the past 24 hrs:   BP Temp Temp src Pulse Resp SpO2   24 0810 127/66 -- -- 65 -- --   24 0734 (!) 148/90 98.3  F (36.8  C) Oral 62 16 97 %   24 0542 139/87 98.3  F (36.8  C) Oral 61 16 96 %   24 0118 117/66 98.2  F (36.8  C) Oral 66 12 95 %   24 2114 (!) 154/70 98.2  F (36.8  C) Oral 73 20 95 %   24 1948 (!) 149/87 98.8  F (37.1  C) Oral -- 19 --   24 1843 137/74 -- -- -- 20 --   24 1828 135/72 -- -- -- 20 --   24 1813 (!) 148/77 -- -- -- -- --   24 1758 (!) 151/72 -- -- -- -- --   24 1743 (!) 158/78 -- -- -- 20 --   24 1729 (!) 159/72 98.9  F (37.2  C) Oral -- 20 --   24 1714 (!) 160/68 -- -- -- -- --   24 1659 (!) 147/69 -- -- -- -- --   24 1627 (!) 153/86 -- -- -- -- --   24 1626 -- 99  F (37.2  C) Oral -- 20 --   24 1457 122/66 98.8  F (37.1  C) Oral -- 16 --   24 1407 128/83 -- -- -- 16 --   24 1256 -- 98.9  F (37.2  C) Oral -- 20 --   24 1255 123/63 -- -- -- -- --   24 1149 123/78 -- -- -- 20 --     General - alert, comfortable  Heart - RRR, no murmurs  Lungs - CTA  bilaterally  Abdomen - fundus firm, nontender, below umbilicus  Extremities - trace edema  Admission on 08/07/2024   Component Date Value Ref Range Status    Hemoglobin 08/07/2024 12.3  11.7 - 15.7 g/dL Final    Treponema Antibody Total 08/07/2024 Nonreactive  Nonreactive Final    ABO/RH(D) 08/07/2024 O POS   Final    Antibody Screen 08/07/2024 Negative  Negative Final    SPECIMEN EXPIRATION DATE 08/07/2024 [id number]   Final    WBC Count 08/07/2024 8.7  4.0 - 11.0 10e3/uL Final    RBC Count 08/07/2024 4.00  3.80 - 5.20 10e6/uL Final    Hemoglobin 08/07/2024 12.8  11.7 - 15.7 g/dL Final    Hematocrit 08/07/2024 38.7  35.0 - 47.0 % Final    MCV 08/07/2024 97  78 - 100 fL Final    MCH 08/07/2024 32.0  26.5 - 33.0 pg Final    MCHC 08/07/2024 33.1  31.5 - 36.5 g/dL Final    RDW 08/07/2024 14.0  10.0 - 15.0 % Final    Platelet Count 08/07/2024 176  150 - 450 10e3/uL Final    AST 08/07/2024 23  0 - 45 U/L Final    ALT 08/07/2024 <5  0 - 50 U/L Final    Creatinine 08/07/2024 0.58  0.51 - 0.95 mg/dL Final    GFR Estimate 08/07/2024 >90  >60 mL/min/1.73m2 Final    eGFR calculated using 2021 CKD-EPI equation.      Discharge Medications:   See medication reconciliation.     Completed by:   Galina Antonio MD  [facility]  8/8/2024 11:05 AM   used:  Ritesh .

## 2024-08-08 NOTE — PROVIDER NOTIFICATION
Dr. Stoddard notified of patient's most recent BP of 154/70. No signs of preeclampsia. Discussed possibility of rise in BP d/t discomfort. Will try encouraging pain medication. Will notify Dr. Stoddard with any severe range pressures.

## 2024-08-08 NOTE — PROVIDER NOTIFICATION
D:  Patient admitted to [facility]-7 via wheelchair with  and support person Preh.  A:  Bedside report received from Jennifer MARCUM Oriented patient and family to surroundings; call light within reach. 4 Part ID bands double checked with reporting RN.  R:  Patient and  tolerated transfer. Care assumed.

## 2024-08-08 NOTE — LACTATION NOTE
"This note was copied from a baby's chart.  Lactation Visit:      Hours since Delivery: 18 hours old.    Gestational Age at Delivery: 40.0 weeks.    Visit with Lactation: Mother is a multip who delivered vaginally yesterday everning; she states she breast fed her previous children for 12+ months without difficulty. Since birth, infant has been to breast 5 times, has received 10-15mL of formula via bottle per feeding, has voided x3, stooled x2, and will be weighed at 24 hour  screening. Mother states she feels infant is latching well at breast, then they are supplementing afterwards d/t mother having \"no milk\". Education given on normal production of milk after delivery, the importance of pumping to stimulate a plentiful milk supply, and pacing infants bottle; parents verbalized understanding, and mother declines pumping while in hospital. Mother appears reserved during visit; she sates she doesn't have any questions or concerns at this time. Mother states she hasn't pumped with her other children, and she doesn't want to start. Provided family with typical volumes infants take via bottle, FOB states he can read english, and that he doesn't need it translated into [place]. Encouraged mother to let primary RN know if she would like lactation to return for feeding assistance or if questions arise. Mother aware of lactation resources available to her after discharging from hospital.     Plan: Skin-to-skin prior to feedings. Continue breastfeeding and/or bottle feeding on-demand and/or every 2-3 hours. Track feedings and diaper output. Encouraged mother to pump for \"missed\" feedings at breast-mother declines while in hospital.    Has Breast Pump for Home: Yes.    Education given: Stages of milk production after delivery,  behaviors during first few days of life, How do I know if my baby is finished & getting enough, Paced bottle feeding, Importance of tracking all feedings and diaper output, Hand expression, " Pumping for missed feedings at breast, How to tell if breastfeeding is going well, Supplementation volumes during first few days of life, and Breast pump use for home.

## 2024-08-08 NOTE — H&P
Maternal Admission H&P  [facility]  Date of Admission: 2024    Name      Colten Meade         1995  MRN       [id number]  PCP        Dr. Flor Stoddard at [facility].    ________________________________________________________________________    Assessment and Plan:  29 year old  at 40w0d, admitted for induction of labor due to placental abnormalities (large placental lakes, infarcts). SVE favorable on admission. FHT category 1.   Baby AGA. Anticipate .  Group B strep: positive, GBS bacteruria early in pregnancy- plan for IV PCN  IV pitocin, titrate as able, AROM when able  ________________________________________________________________________    Chief Complaint: induction of labor. Indication: placental abnormalities    HPI: Colten Meade is a 29 year old woman at 40w0d, based on an Estimated Date of Delivery: Data Unavailable. She presents for scheduled induction of labor due to placental abnormalities with large lakes and infarcts. SVE favorable on admission. She denies leakage of fluid, vaginal bleeding. Normal fetal movement.    History of placental lakes and infarcts and has been following with [facility] throughout the pregnancy. Growth US normal and she had been recommended to have delivery at 39 weeks. Patient had declined and preferred waiting until her due date today. History of breech presentation at 37 weeks s/p successful ECV. She has history of fetal macrosomia for her last baby with 30 second shoulder dystocia.     Patient Active Problem List    Diagnosis Date Noted     (normal spontaneous vaginal delivery) 2024     Priority: Medium    High-risk pregnancy, unspecified trimester 2024     Priority: Medium    Placental abnormality in third trimester 2024     Priority: Medium     Seen on anatomy scan- referred to [facility] for further evaluation.      History of shoulder dystocia in prior pregnancy 2024     Priority: Medium     Left-sided low back pain with left-sided sciatica 10/02/2023     Priority: Medium      OB History    Para Term  AB Living   7 5 4 1 2 4   SAB IAB Ectopic Multiple Live Births   2 0 0 0 4      # Outcome Date GA Lbr Dmitry/2nd Weight Sex Type Anes PTL Lv   7 Term 24 40w0d 02:16 / 00:04 3.685 kg (8 lb 2 oz) F Vag-Spont None N TORITO      Name: Brody Meade      Apgar1: 8  Apgar5: 9   6 Term 09/15/22 41w1d 04:43 / 00:07 4.13 kg (9 lb 1.7 oz) F Vag-Spont None  TORITO      Complications: Fetal Intolerance, Shoulder Dystocia      Name: NIA,FEMALE-NOVA      Apgar1: 7  Apgar5: 9   5 SAB 21     SAB         Birth Comments: s/p D&C   4 SAB 21 9w0d    SAB      3 Term 19 40w6d 06:20 / 02:21 3.655 kg (8 lb 0.9 oz) F Vag-Spont Local N TORITO      Name: BRODY MEADE      Apgar1: 8  Apgar5: 9   2 Term 10/10/12 40w0d  2.9 kg (6 lb 6.3 oz) F Vag-Spont None  TORITO      Birth Comments: in [facility]      Name: Noemi Meade   1   18w0d   M    FD      Complications: Other Excessive Bleeding, Hemorrhage     Review of Systems Negative except what is noted in HPI.   Past Medical History:   Diagnosis Date     (normal spontaneous vaginal delivery)     Single liveborn infant delivered vaginally 2022      Past Surgical History:   Procedure Laterality Date    DILATION AND CURETTAGE N/A 2021    Procedure: SUCTION DILATION AND CURETTAGE;  Surgeon: Boby Wren MD;  Location: [facility];  Service: Gynecology    LASER LAPAROSCOPY Bilateral 2018    Procedure: LAPAROSCOPY, HYSTEROSCOPY with tubal dye study;  Surgeon: Boby Wren MD;  Location: [facility];  Service:     OTHER SURGICAL HISTORY      nill    CO SURG RX MISSED ABORTN,1ST TRI N/A 2021    Procedure: SUCTION DILATION AND CURETTAGE;  Surgeon: Jamarcus Rosario MD;  Location: [facility];  Service: Gynecology   Depo-provera [medroxyprogesterone]  Family History   Problem Relation Age of Onset    Hypertension  Mother     Diabetes Father     Hypertension Father      Social History     Socioeconomic History    Marital status:      Spouse name: Not on file    Number of children: Not on file    Years of education: Not on file    Highest education level: Not on file   Occupational History    Not on file   Tobacco Use    Smoking status: Never     Passive exposure: Never    Smokeless tobacco: Never   Vaping Use    Vaping status: Never Used   Substance and Sexual Activity    Alcohol use: No    Drug use: No    Sexual activity: Yes     Partners: Male     Birth control/protection: None   Other Topics Concern    Not on file   Social History Narrative    Not on file     Social Determinants of Health     Financial Resource Strain: Low Risk  (1/5/2024)    Financial Resource Strain     Within the past 12 months, have you or your family members you live with been unable to get utilities (heat, electricity) when it was really needed?: No   Food Insecurity: Low Risk  (1/5/2024)    Food Insecurity     Within the past 12 months, did you worry that your food would run out before you got money to buy more?: No     Within the past 12 months, did the food you bought just not last and you didn t have money to get more?: No   Transportation Needs: Low Risk  (1/5/2024)    Transportation Needs     Within the past 12 months, has lack of transportation kept you from medical appointments, getting your medicines, non-medical meetings or appointments, work, or from getting things that you need?: No   Physical Activity: Not on file   Stress: Not on file   Social Connections: Not on file   Interpersonal Safety: Low Risk  (4/16/2024)    Interpersonal Safety     Do you feel physically and emotionally safe where you currently live?: Yes     Within the past 12 months, have you been hit, slapped, kicked or otherwise physically hurt by someone?: No     Within the past 12 months, have you been humiliated or emotionally abused in other ways by your partner  or ex-partner?: No   Housing Stability: Low Risk  (1/5/2024)    Housing Stability     Do you have housing? : Yes     Are you worried about losing your housing?: No     Prior to Admission Medication List  Medications Prior to Admission   Medication Sig Dispense Refill Last Dose    acetaminophen (TYLENOL) 500 MG tablet Take 1-2 tablets (500-1,000 mg) by mouth every 6 hours as needed for mild pain 100 tablet 1 8/6/2024    Prenatal Vit-Fe Fumarate-FA (PRENATAL VITAMIN) 27-0.8 MG TABS Take 1 tablet by mouth daily 90 tablet 3 8/6/2024      Allergies  Allergies   Allergen Reactions    Depo-Provera [Medroxyprogesterone] Dizziness       Dizziness and Neck Pain     Immunization History   Administered Date(s) Administered    COVID-19 MONOVALENT 12+ (Pfizer) 05/04/2021, 06/04/2021    Flu, Unspecified 02/22/2016    HPV Quadrivalent 07/22/2010, 09/26/2011, 08/12/2014, 09/25/2014, 02/26/2015, 10/21/2015    HepA, Unspecified 08/12/2014, 02/26/2015    HepB, Unspecified 02/25/2014, 03/28/2014, 09/25/2014    Hepatitis A (ADULT 19+) 11/18/2009, 07/22/2010    Hepatitis B, Adult 07/22/2009, 11/18/2009, 01/27/2010    Influenza (H1N1) 01/12/2010    Influenza (IIV3) PF 11/18/2009, 09/26/2011, 11/16/2014    Influenza Intranasal Vaccine 11/20/2011, 10/21/2012    Influenza Vaccine >6 months,quad, PF 10/21/2015, 03/08/2018, 11/03/2018, 10/10/2019, 10/02/2023    Influenza Vaccine, 6+MO IM (QUADRIVALENT W/PRESERVATIVES) 12/06/2015, 09/20/2018, 10/05/2020, 11/12/2021    MMR 07/22/2009, 11/18/2009, 02/22/2010, 02/25/2014, 03/28/2014    Meningococcal ACWY (Menactra ) 11/18/2009    Meningococcal Mcv4 Conjugate,unspecified  08/12/2014    Nasal Influenza Vaccine 2-49 (FluMist) 11/24/2013    Poliovirus, inactivated (IPV) 11/18/2009, 01/12/2010, 02/22/2010, 09/26/2011, 08/12/2014, 09/25/2014, 03/26/2015    TDAP (Adacel,Boostrix) 11/18/2009, 01/12/2010, 08/12/2014, 02/26/2015, 05/24/2019, 06/28/2022, 05/14/2024    Td (Adult), Adsorbed 07/28/2001,  "07/22/2009, 07/28/2011    Td,adult,historic,unspecified 02/25/2014    Tdap (Adult) Unspecified Formulation 02/25/2014    Varicella 11/18/2009, 08/12/2014, 09/25/2014, 10/21/2015      Physical Exam  Patient Vitals for the past 24 hrs:   BP Temp Temp src Pulse Resp SpO2 Height Weight   08/08/24 0118 117/66 98.2  F (36.8  C) Oral 66 12 95 % -- --   08/07/24 2114 (!) 154/70 98.2  F (36.8  C) Oral 73 20 95 % -- --   08/07/24 1948 (!) 149/87 98.8  F (37.1  C) Oral -- 19 -- -- --   08/07/24 1843 137/74 -- -- -- 20 -- -- --   08/07/24 1828 135/72 -- -- -- 20 -- -- --   08/07/24 1813 (!) 148/77 -- -- -- -- -- -- --   08/07/24 1758 (!) 151/72 -- -- -- -- -- -- --   08/07/24 1743 (!) 158/78 -- -- -- 20 -- -- --   08/07/24 1729 (!) 159/72 98.9  F (37.2  C) Oral -- 20 -- -- --   08/07/24 1714 (!) 160/68 -- -- -- -- -- -- --   08/07/24 1659 (!) 147/69 -- -- -- -- -- -- --   08/07/24 1627 (!) 153/86 -- -- -- -- -- -- --   08/07/24 1626 -- 99  F (37.2  C) Oral -- 20 -- -- --   08/07/24 1457 122/66 98.8  F (37.1  C) Oral -- 16 -- -- --   08/07/24 1407 128/83 -- -- -- 16 -- -- --   08/07/24 1256 -- 98.9  F (37.2  C) Oral -- 20 -- -- --   08/07/24 1255 123/63 -- -- -- -- -- -- --   08/07/24 1149 123/78 -- -- -- 20 -- -- --   08/07/24 1059 118/57 98.7  F (37.1  C) Oral -- 20 -- -- --   08/07/24 0800 120/72 98.3  F (36.8  C) Oral -- 16 -- 1.473 m (4' 10\") 74.8 kg (165 lb)     Wt Readings from Last 1 Encounters:   08/07/24 74.8 kg (165 lb)   Prepregnancy: 67.6 kg (149 lb), BMI 31.15. Total gain: 7.258 kg (16 lb) (expected gain: 5 kg (11 lb)-9 kg (19 lb)).    HEART: RRR, no murmur  LUNGS: CTA bilaterally  Fetal Heart Tones: FHT category 1  CONTRACTIONS:  irregular, q 10 minutes  CERVIX: dilation 3.5 cm   FLUID: None noted.  Fetal Presentation: vertex.  Labs    Recent Results (from the past 24 hour(s))   Hemoglobin    Collection Time: 08/07/24  8:39 AM   Result Value Ref Range    Hemoglobin 12.3 11.7 - 15.7 g/dL   Adult Type and Screen    " Collection Time: 08/07/24  8:39 AM   Result Value Ref Range    ABO/RH(D) O POS     Antibody Screen Negative Negative    SPECIMEN EXPIRATION DATE [id number]    CBC with platelets    Collection Time: 08/07/24  5:48 PM   Result Value Ref Range    WBC Count 8.7 4.0 - 11.0 10e3/uL    RBC Count 4.00 3.80 - 5.20 10e6/uL    Hemoglobin 12.8 11.7 - 15.7 g/dL    Hematocrit 38.7 35.0 - 47.0 %    MCV 97 78 - 100 fL    MCH 32.0 26.5 - 33.0 pg    MCHC 33.1 31.5 - 36.5 g/dL    RDW 14.0 10.0 - 15.0 %    Platelet Count 176 150 - 450 10e3/uL   AST    Collection Time: 08/07/24  5:48 PM   Result Value Ref Range    AST 23 0 - 45 U/L   ALT    Collection Time: 08/07/24  5:48 PM   Result Value Ref Range    ALT <5 0 - 50 U/L   Creatinine    Collection Time: 08/07/24  5:48 PM   Result Value Ref Range    Creatinine 0.58 0.51 - 0.95 mg/dL    GFR Estimate >90 >60 mL/min/1.73m2      Group B Strep PCR   Date Value Ref Range Status   07/12/2024 Negative Negative Final     Comment:     Presumed negative for Streptococcus agalactiae (Group B Streptococcus) or the number of organisms may be below the limit of detection of the assay.      Antibody Screen   Date Value Ref Range Status   08/07/2024 Negative Negative Final     Hepatitis B Surface Antigen   Date Value Ref Range Status   01/26/2024 Nonreactive Nonreactive Final     Chlamydia trachomatis   Date Value Ref Range Status   01/26/2024 Negative Negative Final     Comment:     A negative result by transcription mediated amplification does not preclude the presence of C. trachomatis infection because results are dependent on proper and adequate collection, absence of inhibitors and sufficient rRNA to be detected.     Neisseria gonorrhoeae   Date Value Ref Range Status   01/26/2024 Negative Negative Final     Comment:     Negative for N. gonorrhoeae rRNA by transcription mediated amplification. A negative result by transcription mediated amplification does not preclude the presence of C.  trachomatis infection because results are dependent on proper and adequate collection, absence of inhibitors and sufficient rRNA to be detected.     Hemoglobin   Date Value Ref Range Status   08/07/2024 12.8 11.7 - 15.7 g/dL Final        Completed by:   Flor Stoddard MD  [facility]   used: Felix.

## 2024-08-08 NOTE — PROGRESS NOTES
Birthplace RN Care Coordinator Note    On license of [facility]  [id number]  1995    Chart reviewed, discharge follow-up plan discussed with attending MD and bedside RN, needs assessed. If stable, discharge planned this evening after 's 24hr testing. Patient requests all follow-up through clinic, declines home care visit. Post-delivery follow-up appointments with  planned in 2 & 6 weeks at [facility].      RN Care Coordinator will continue to follow and assist if needed with discharge plan.

## 2024-08-08 NOTE — PROGRESS NOTES
Data: [facility] transferred to [facility]19/[id number] via wheelchair. Patient transferred to Postpartum with .    Action: Receiving unit notified of transfer. Patient and support person notified of room change. Patient and belongings accompanied by Registered Nurse. Bedside report given to Ruthie Chatterjee. Fundal check performed with receiving RN. Oriented patient to surroundings. Call light within reach.    Response: Patient tolerated transfer.    Jennifer Butt RN  2024 8:55 PM

## 2024-08-08 NOTE — DISCHARGE INSTRUCTIONS
Warning Signs after Having a Baby    Keep this paper on your fridge or somewhere else where you can see it.    Call your provider if you have any of these symptoms up to 12 weeks after having your baby.    Thoughts of hurting yourself or your baby  Pain in your chest or trouble breathing  Severe headache not helped by pain medicine  Eyesight concerns (blurry vision, seeing spots or flashes of light, other changes to eyesight)  Fainting, shaking or other signs of a seizure    Call 9-1-1 if you feel that it is an emergency.     The symptoms below can happen to anyone after giving birth. They can be very serious. Call your provider if you have any of these warning signs.    My provider s phone number: _______________________    Losing too much blood (hemorrhage)    Call your provider if you soak through a pad in less than an hour or pass blood clots bigger than a golf ball. These may be signs that you are bleeding too much.    Blood clots in the legs or lungs    After you give birth, your body naturally clots its blood to help prevent blood loss. Sometimes this increased clotting can happen in other areas of the body, like the legs or lungs. This can block your blood flow and be very dangerous.     Call your provider if you:  Have a red, swollen spot on the back of your leg that is warm or painful when you touch it.   Are coughing up blood.     Infection    Call your provider if you have any of these symptoms:  Fever of 100.4 F (38 C) or higher.  Pain or redness around your stitches if you had an incision.   Any yellow, white, or green fluid coming from places where you had stitches or surgery.    Mood Problems (postpartum depression)    Many people feel sad or have mood changes after having a baby. But for some people, these mood swings are worse.     Call your provider right away if you feel so anxious or nervous that you can't care for yourself or your baby.    Preeclampsia (high blood pressure)    Even if you  didn't have high blood pressure when you were pregnant, you are at risk for the high blood pressure disease called preeclampsia. This risk can last up to 12 weeks after giving birth.     Call your provider if you have:   Pain on your right side under your rib cage  Sudden swelling in the hands and face    Remember: You know your body. If something doesn't feel right, get medical help.     For informational purposes only. Not to replace the advice of your health care provider. Copyright 2020 [facility]. All rights reserved. Clinically reviewed by Allie Orellana, RNC-OB, MSN. [employer] [id number] - Rev 02/23.

## 2024-08-08 NOTE — PLAN OF CARE
Problem: Adult Inpatient Plan of Care  Goal: Readiness for Transition of Care  2024 1840 by Alycia Fitzpatrick, RN  Outcome: Met         RN utilized  for all assessments, cares, and education. Patient assessments and vitals are WDL. Spouse is supportive of pt. Birth certificate and PPMA are done. No ROP needed. Mother has been breast and formula feeding infant. Mom states she has a breast pump at home. Patient signed for medications. Weight obtained. Educated patient on medications, worsening symptoms, and follow-up care. Pt and spouse verbalized understanding with no further questions.     D:  Patient desires discharge home.  Discharge orders received and entered by provider.  A:  Discharge instructions reviewed with the patient.  All questions and concerns addressed.  R:  Discharge criteria met.  4 Part ID bands double checked.   discharged in car seat with parents.  The nurse escorted patient to car .

## 2024-08-08 NOTE — PLAN OF CARE
Problem: Adult Inpatient Plan of Care  Goal: Optimal Comfort and Wellbeing  Outcome: Progressing  Intervention: Provide Person-Centered Care  Recent Flowsheet Documentation  Taken 8/8/2024 0119 by Mariana Kelly RN  Trust Relationship/Rapport:   care explained   emotional support provided   choices provided   empathic listening provided   questions answered   reassurance provided   thoughts/feelings acknowledged      Goal Outcome Evaluation:      Plan of Care Reviewed With: patient        Pt. VSS, fundus is 1 under U and lochia is light.  She is formula and breast feeding.  Bonding well with her baby.  Pain is controlled with Ibuprofen.

## 2024-08-10 ENCOUNTER — PATIENT OUTREACH (OUTPATIENT)
Dept: CARE COORDINATION | Facility: CLINIC | Age: 29
End: 2024-08-10
Payer: COMMERCIAL

## 2024-08-10 NOTE — PROGRESS NOTES
Clinic Care Coordination Contact  Transitions of Care Outreach    Chief Complaint   Patient presents with    Clinic Care Coordination - Post Hospital       Most Recent Admission Date: 2024   Most Recent Admission Diagnosis:    Most Recent Discharge Date: 2024   Most Recent Discharge Diagnosis: High-risk pregnancy, unspecified trimester - O09.90   (normal spontaneous vaginal delivery) - O80     Transitions of Care Assessment    Discharge Assessment  How are you doing now that you are home?: Pt stated that she is doing well  How are your symptoms? (Red Flag symptoms escalate to triage hotline per guidelines): Improved  Do you know how to contact your clinic care team if you have future questions or changes to your health status? : Yes  Does the patient have their discharge instructions? : Yes  Does the patient have questions regarding their discharge instructions? : No  Were you started on any new medications or were there changes to any of your previous medications? : Yes  Does the patient have all of their medications?: Yes  Do you have questions regarding any of your medications? : No  Do you have all of your needed medical supplies or equipment (DME)?  (i.e. oxygen tank, CPAP, cane, etc.): Yes    Post-op ([facility] CTA Only)  If the patient had a surgery or procedure, do they have any questions for a nurse?: No         Follow up Plan     Follow up with Dr. Flor Stoddard  in 2 weeks and 6 weeks for routine postpartum care     Future Appointments   Date Time Provider Department Center   2024  8:30 AM Flor Stoddard MD [facility]     Outpatient Plan as outlined on AVS reviewed with patient.    For any urgent concerns, please contact our 24 hour nurse triage line: [phone]     EMILIE Farr  [phone]  [facility] Care Resource [facility]

## 2024-09-20 ENCOUNTER — PRENATAL OFFICE VISIT (OUTPATIENT)
Dept: FAMILY MEDICINE | Facility: CLINIC | Age: 29
End: 2024-09-20
Payer: COMMERCIAL

## 2024-09-20 VITALS
WEIGHT: 144 LBS | HEIGHT: 58 IN | OXYGEN SATURATION: 99 % | SYSTOLIC BLOOD PRESSURE: 124 MMHG | DIASTOLIC BLOOD PRESSURE: 85 MMHG | BODY MASS INDEX: 30.23 KG/M2 | HEART RATE: 56 BPM | TEMPERATURE: 98.4 F | RESPIRATION RATE: 12 BRPM

## 2024-09-20 DIAGNOSIS — Z23 HIGH PRIORITY FOR 2019-NCOV VACCINE: ICD-10-CM

## 2024-09-20 DIAGNOSIS — Z34.90 PREGNANCY, UNSPECIFIED GESTATIONAL AGE: ICD-10-CM

## 2024-09-20 PROBLEM — O09.90 HIGH-RISK PREGNANCY, UNSPECIFIED TRIMESTER: Status: RESOLVED | Noted: 2024-05-30 | Resolved: 2024-09-20

## 2024-09-20 PROBLEM — O43.103 PLACENTAL ABNORMALITY IN THIRD TRIMESTER: Status: RESOLVED | Noted: 2024-03-29 | Resolved: 2024-09-20

## 2024-09-20 PROCEDURE — 90656 IIV3 VACC NO PRSV 0.5 ML IM: CPT | Performed by: FAMILY MEDICINE

## 2024-09-20 PROCEDURE — 90480 ADMN SARSCOV2 VAC 1/ONLY CMP: CPT | Performed by: FAMILY MEDICINE

## 2024-09-20 PROCEDURE — 91320 SARSCV2 VAC 30MCG TRS-SUC IM: CPT | Performed by: FAMILY MEDICINE

## 2024-09-20 PROCEDURE — 99207 PR POST PARTUM EXAM: CPT | Performed by: FAMILY MEDICINE

## 2024-09-20 PROCEDURE — 90471 IMMUNIZATION ADMIN: CPT | Performed by: FAMILY MEDICINE

## 2024-09-20 ASSESSMENT — EDINBURGH POSTNATAL DEPRESSION SCALE (EPDS)
THE THOUGHT OF HARMING MYSELF HAS OCCURRED TO ME: NEVER
I HAVE BEEN ABLE TO LAUGH AND SEE THE FUNNY SIDE OF THINGS: NOT QUITE SO MUCH NOW
I HAVE BLAMED MYSELF UNNECESSARILY WHEN THINGS WENT WRONG: NO, NEVER
I HAVE FELT SAD OR MISERABLE: NO, NOT AT ALL
I HAVE BEEN SO UNHAPPY THAT I HAVE HAD DIFFICULTY SLEEPING: NOT AT ALL
I HAVE BEEN SO UNHAPPY THAT I HAVE BEEN CRYING: NO, NEVER
I HAVE FELT SCARED OR PANICKY FOR NO GOOD REASON: NO, NOT AT ALL
THINGS HAVE BEEN GETTING ON TOP OF ME: NO, I HAVE BEEN COPING AS WELL AS EVER
I HAVE LOOKED FORWARD WITH ENJOYMENT TO THINGS: AS MUCH AS I EVER DID
TOTAL SCORE: 3
I HAVE BEEN ANXIOUS OR WORRIED FOR NO GOOD REASON: YES, SOMETIMES

## 2024-09-20 NOTE — PROGRESS NOTES
Assessment/Plan:     Colten was seen today for postpartum care and imm/inj.    Diagnoses and all orders for this visit:    Routine postpartum follow-up    High priority for 2019-nCoV vaccine    Pregnancy, unspecified gestational age    Other orders  -     COVID-19 12+ (PFIZER)  -     INFLUENZA VACCINE, SPLIT VIRUS, TRIVALENT,PF (FLUZONE\FLUARIX)        Anemia:  Normal antepartum hemoglobin with no excessive blood loss  Breastfeeding/Bottle feeding:  Pt is breast and formula feeding baby  Contraception:   Long discussion- had dizziness and swelling from depo in 2012 reportedly. Interested in nexplanon- declines today but wants to schedule this in about 2 months. Declines bridging with pills  Depression:   See [facility] Depresion survey  Exercise:   Encouraged increasing exercise based on how she is feeling.  Healthcare maintenance:   Up to date  Immunizations:    Immunizations needed; see orders               Subjective:      Colten Meade is a 29 year old female who presents for a postpartum visit.   She is postpartum following a spontaneous vaginal delivery.   I have fully reviewed the prenatal and intrapartum course.   Delivery notes below  Postpartum course has been Unremarkable.  Baby's course has been Uncomplicated.  Periods:  still having spotting No LMP recorded (lmp unknown). (Menstrual status: Postpartum).   Bowel or bladder concerns: denies  Patient has not resumed intercourse.    [place]  Depression Scale: see flowsheet    Last hgb on file:    Lab Results   Component Value Date    HGB 12.8 2024        The following portions of the patient's history were reviewed and updated as appropriate: allergies, current medications and problem list     OB History    Para Term  AB Living   7 5 4 1 2 4   SAB IAB Ectopic Multiple Live Births   2 0 0 0 4      # Outcome Date GA Lbr Dmitry/2nd Weight Sex Type Anes PTL Lv   7 Term 24 40w0d 02:16 / 00:04 3.685 kg (8 lb 2 oz) F Vag-Spont None N  "TORITO      Name: Janis Sanchez      Apgar1: 8  Apgar5: 9   6 Term 09/15/22 41w1d 04:43 / 00:07 4.13 kg (9 lb 1.7 oz) F Vag-Spont None  TORITO      Complications: Fetal Intolerance, Shoulder Dystocia      Name: NIA,FEMALE-NOVA      Apgar1: 7  Apgar5: 9   5 SAB 21     SAB         Birth Comments: s/p D&C   4 SAB 21 9w0d    SAB      3 Term 19 40w6d 06:20 / 02:21 3.655 kg (8 lb 0.9 oz) F Vag-Spont Local N TORITO      Name: SHARON MEADE-NOVA      Apgar1: 8  Apgar5: 9   2 Term 10/10/12 40w0d  2.9 kg (6 lb 6.3 oz) F Vag-Spont None  TORITO      Birth Comments: in [facility]      Name: Noemi Meade   1   18w0d   M    FD      Complications: Other Excessive Bleeding, Hemorrhage     Information for the patient's :  Janis Sanchez [[id number]]   Janis Sanchez     Objective:      /85   Pulse 56   Temp 98.4  F (36.9  C) (Oral)   Resp 12   Ht 1.473 m (4' 10\")   Wt 65.3 kg (144 lb)   LMP  (LMP Unknown)   SpO2 99%   Breastfeeding Yes   BMI 30.10 kg/m      Physical Exam:  General Appearance: Alert, cooperative, no distress, appears stated age  Lungs: Clear to auscultation bilaterally, respirations unlabored  Heart: Regular rate and rhythm, S1 and S2 normal, no murmur, rub, or gallop  Abdomen: Soft, non-tender, no masses, no organomegaly  Pelvic:Normally developed genitalia with no external lesions or eruptions. Well healed perineal laceration  Extremities: Extremities normal, atraumatic, no cyanosis or edema  Skin: Skin color, texture, turgor normal, no rashes or lesions           "

## 2024-10-30 ENCOUNTER — TELEPHONE (OUTPATIENT)
Dept: TRANSPLANT | Age: 29
End: 2024-10-30

## 2024-12-20 ENCOUNTER — MEDICAL CORRESPONDENCE (OUTPATIENT)
Dept: HEALTH INFORMATION MANAGEMENT | Facility: CLINIC | Age: 29
End: 2024-12-20
Payer: COMMERCIAL

## 2025-01-09 DIAGNOSIS — Z30.017 NEXPLANON INSERTION: Primary | ICD-10-CM

## 2025-01-10 ENCOUNTER — OFFICE VISIT (OUTPATIENT)
Dept: FAMILY MEDICINE | Facility: CLINIC | Age: 30
End: 2025-01-10
Payer: COMMERCIAL

## 2025-01-10 VITALS
OXYGEN SATURATION: 98 % | WEIGHT: 147.19 LBS | BODY MASS INDEX: 30.89 KG/M2 | TEMPERATURE: 98.1 F | HEART RATE: 62 BPM | SYSTOLIC BLOOD PRESSURE: 113 MMHG | DIASTOLIC BLOOD PRESSURE: 68 MMHG | RESPIRATION RATE: 16 BRPM | HEIGHT: 58 IN

## 2025-01-10 DIAGNOSIS — Z97.5 NEXPLANON IN PLACE: ICD-10-CM

## 2025-01-10 DIAGNOSIS — Z30.017 NEXPLANON INSERTION: ICD-10-CM

## 2025-01-10 DIAGNOSIS — Z30.017 INSERTION OF IMPLANTABLE SUBDERMAL CONTRACEPTIVE: Primary | ICD-10-CM

## 2025-01-10 LAB — HCG UR QL: NEGATIVE

## 2025-01-10 PROCEDURE — 81025 URINE PREGNANCY TEST: CPT | Performed by: FAMILY MEDICINE

## 2025-01-10 PROCEDURE — 11981 INSERTION DRUG DLVR IMPLANT: CPT | Performed by: FAMILY MEDICINE

## 2025-01-10 NOTE — PROGRESS NOTES
"  Assessment & Plan     Insertion of implantable subdermal contraceptive  Nexplanon insertion: UPT negative. Nexplanon placed- see procedure note below. Tolerated procedure without complications.  - INSERTION NON-BIODEGRADABLE DRUG DELIVERY IMPLANT  - etonogestrel (NEXPLANON) subdermal implant 68 mg  - HCG qualitative urine          BMI  Estimated body mass index is 30.76 kg/m  as calculated from the following:    Height as of this encounter: 1.473 m (4' 10\").    Weight as of this encounter: 66.8 kg (147 lb 3 oz).             Subjective   Colten is a 30 year old, presenting for the following health issues:  Contraception      1/10/2025    10:07 AM   Additional Questions   Roomed by Елена CEVALLOS   Accompanied by Self     Contraception     Desires Nexplanon  No unprotected intercourse in the last 2 weeks        Objective    /68   Pulse 62   Temp 98.1  F (36.7  C) (Oral)   Resp 16   Ht 1.473 m (4' 10\")   Wt 66.8 kg (147 lb 3 oz)   SpO2 98%   Breastfeeding Yes   BMI 30.76 kg/m    Body mass index is 30.76 kg/m .  Physical Exam   Ext: left arm with nexplanon palpated after insertion          Nexplanon Insertion  We reviewed the Nexplanon literature and counseling re: full range of contraceptive options. She denies any contraindications to its use. We discussed that her bleeding profile will change. She is aware of 3 year effectiveness of this method.  She is aware of potential side effects including infection at site, intermenstrual bleeding, irregular bleeding, amenorrhea, need for minor surgical procedure to remove or more extensive if implant is deep    Appropriate candidate for Nexplanon    PLAN & PROCEDURE NOTE:  She elected to proceed with the placement after the risks and benefits were discussed. Denies allergy to local anesthesia, keloid formation.     Consent signed and will be scanned in EMR.     After cleansing left (non-dominant) arm above the elbow, 2 mL of 1% Lidocaine was administered along the " planned injection site for Nexplanon. Insertion site cleansed with iodine. Nexplanon was then inserted 8 cm above the medial epicondyle of the humerus. Palpation revealed subdermal placement; the patient was able to feel implant as well.     Bleeding was minimal and a pressure dressing was applied with instructions to leave this in place for 24 hours. Pt was instructed to observe for signs/symptoms of any infection (localized tenderness, pain, inflammation) or excessive bruising.  No apparent distress at completion of procedure. No complications.              Signed Electronically by: Flor Stoddard MD

## 2025-02-12 DIAGNOSIS — N92.1 BREAKTHROUGH BLEEDING ON NEXPLANON: Primary | ICD-10-CM

## 2025-02-12 DIAGNOSIS — Z97.5 BREAKTHROUGH BLEEDING ON NEXPLANON: Primary | ICD-10-CM

## 2025-02-12 RX ORDER — IBUPROFEN 600 MG/1
600 TABLET, FILM COATED ORAL 3 TIMES DAILY
Qty: 21 TABLET | Refills: 0 | Status: SHIPPED | OUTPATIENT
Start: 2025-02-12 | End: 2025-02-19

## 2025-02-17 ENCOUNTER — MEDICAL CORRESPONDENCE (OUTPATIENT)
Dept: HEALTH INFORMATION MANAGEMENT | Facility: CLINIC | Age: 30
End: 2025-02-17
Payer: COMMERCIAL

## 2025-03-24 ENCOUNTER — ALLIED HEALTH/NURSE VISIT (OUTPATIENT)
Dept: FAMILY MEDICINE | Facility: CLINIC | Age: 30
End: 2025-03-24
Payer: COMMERCIAL

## 2025-03-24 DIAGNOSIS — Z11.1 TUBERCULOSIS SCREENING: Primary | ICD-10-CM

## 2025-03-24 PROCEDURE — 99207 PR NO CHARGE NURSE ONLY: CPT

## 2025-03-24 NOTE — PROGRESS NOTES
Patient is here today for a Mantoux (TST) test results.    Did patient return to clinic 48-72 hours from Mantoux (TST) placement: Yes -     PPD Induration   Date Value Ref Range Status   03/24/2025 0 0 - 4.99 mm Final     PPD Redness   Date Value Ref Range Status   03/24/2025 Present  Final           Induration Size? Induration <5mm - Enter results in Enter/Edit Activity. Route results to ordering provider.  0 mm    Patient needs form signed? Yes. Follow clinic form process.     Patient reports having previously had the BCG Vaccine: No    Does patient need a two step? No      Mitesh Aragon, BSN RN  [facility]